# Patient Record
Sex: FEMALE | Race: BLACK OR AFRICAN AMERICAN | Employment: UNEMPLOYED | URBAN - METROPOLITAN AREA
[De-identification: names, ages, dates, MRNs, and addresses within clinical notes are randomized per-mention and may not be internally consistent; named-entity substitution may affect disease eponyms.]

---

## 2017-01-03 ENCOUNTER — APPOINTMENT (OUTPATIENT)
Dept: GENERAL RADIOLOGY | Age: 42
End: 2017-01-03
Attending: PHYSICIAN ASSISTANT
Payer: MEDICAID

## 2017-01-03 ENCOUNTER — HOSPITAL ENCOUNTER (EMERGENCY)
Age: 42
Discharge: HOME | End: 2017-01-03
Attending: EMERGENCY MEDICINE | Admitting: EMERGENCY MEDICINE
Payer: MEDICAID

## 2017-01-03 VITALS
DIASTOLIC BLOOD PRESSURE: 80 MMHG | OXYGEN SATURATION: 92 % | SYSTOLIC BLOOD PRESSURE: 150 MMHG | TEMPERATURE: 98.1 F | BODY MASS INDEX: 50.02 KG/M2 | HEIGHT: 64 IN | RESPIRATION RATE: 22 BRPM | HEART RATE: 99 BPM | WEIGHT: 293 LBS

## 2017-01-03 DIAGNOSIS — J45.31 MILD PERSISTENT ASTHMA WITH ACUTE EXACERBATION: Primary | ICD-10-CM

## 2017-01-03 LAB
ALBUMIN SERPL BCP-MCNC: 3.2 G/DL (ref 3.5–5)
ALBUMIN/GLOB SERPL: 0.7 {RATIO} (ref 1.1–2.2)
ALP SERPL-CCNC: 85 U/L (ref 45–117)
ALT SERPL-CCNC: 28 U/L (ref 12–78)
ANION GAP BLD CALC-SCNC: 8 MMOL/L (ref 5–15)
AST SERPL W P-5'-P-CCNC: 30 U/L (ref 15–37)
BASOPHILS # BLD AUTO: 0 K/UL (ref 0–0.1)
BASOPHILS # BLD: 0 % (ref 0–1)
BILIRUB SERPL-MCNC: 0.8 MG/DL (ref 0.2–1)
BNP SERPL-MCNC: 41 PG/ML (ref 0–125)
BUN SERPL-MCNC: 7 MG/DL (ref 6–20)
BUN/CREAT SERPL: 7 (ref 12–20)
CALCIUM SERPL-MCNC: 8.9 MG/DL (ref 8.5–10.1)
CHLORIDE SERPL-SCNC: 101 MMOL/L (ref 97–108)
CO2 SERPL-SCNC: 28 MMOL/L (ref 21–32)
CREAT SERPL-MCNC: 0.95 MG/DL (ref 0.55–1.02)
DIFFERENTIAL METHOD BLD: ABNORMAL
EOSINOPHIL # BLD: 0.1 K/UL (ref 0–0.4)
EOSINOPHIL NFR BLD: 2 % (ref 0–7)
ERYTHROCYTE [DISTWIDTH] IN BLOOD BY AUTOMATED COUNT: 20.7 % (ref 11.5–14.5)
GLOBULIN SER CALC-MCNC: 4.8 G/DL (ref 2–4)
GLUCOSE SERPL-MCNC: 169 MG/DL (ref 65–100)
HCT VFR BLD AUTO: 33.7 % (ref 35–47)
HGB BLD-MCNC: 9.2 G/DL (ref 11.5–16)
LYMPHOCYTES # BLD AUTO: 29 % (ref 12–49)
LYMPHOCYTES # BLD: 1.9 K/UL (ref 0.8–3.5)
MAGNESIUM SERPL-MCNC: 1.8 MG/DL (ref 1.6–2.4)
MCH RBC QN AUTO: 16.6 PG (ref 26–34)
MCHC RBC AUTO-ENTMCNC: 27.3 G/DL (ref 30–36.5)
MCV RBC AUTO: 60.8 FL (ref 80–99)
MONOCYTES # BLD: 0.6 K/UL (ref 0–1)
MONOCYTES NFR BLD AUTO: 9 % (ref 5–13)
NEUTS SEG # BLD: 3.9 K/UL (ref 1.8–8)
NEUTS SEG NFR BLD AUTO: 60 % (ref 32–75)
PLATELET # BLD AUTO: 352 K/UL (ref 150–400)
POTASSIUM SERPL-SCNC: 4.2 MMOL/L (ref 3.5–5.1)
PROT SERPL-MCNC: 8 G/DL (ref 6.4–8.2)
RBC # BLD AUTO: 5.54 M/UL (ref 3.8–5.2)
RBC MORPH BLD: ABNORMAL
SODIUM SERPL-SCNC: 137 MMOL/L (ref 136–145)
WBC # BLD AUTO: 6.5 K/UL (ref 3.6–11)

## 2017-01-03 PROCEDURE — 36415 COLL VENOUS BLD VENIPUNCTURE: CPT | Performed by: PHYSICIAN ASSISTANT

## 2017-01-03 PROCEDURE — 80053 COMPREHEN METABOLIC PANEL: CPT | Performed by: PHYSICIAN ASSISTANT

## 2017-01-03 PROCEDURE — 96365 THER/PROPH/DIAG IV INF INIT: CPT

## 2017-01-03 PROCEDURE — 77030029684 HC NEB SM VOL KT MONA -A

## 2017-01-03 PROCEDURE — 94640 AIRWAY INHALATION TREATMENT: CPT

## 2017-01-03 PROCEDURE — 83880 ASSAY OF NATRIURETIC PEPTIDE: CPT | Performed by: PHYSICIAN ASSISTANT

## 2017-01-03 PROCEDURE — 83735 ASSAY OF MAGNESIUM: CPT | Performed by: PHYSICIAN ASSISTANT

## 2017-01-03 PROCEDURE — 94762 N-INVAS EAR/PLS OXIMTRY CONT: CPT

## 2017-01-03 PROCEDURE — 74011250636 HC RX REV CODE- 250/636: Performed by: PHYSICIAN ASSISTANT

## 2017-01-03 PROCEDURE — 96375 TX/PRO/DX INJ NEW DRUG ADDON: CPT

## 2017-01-03 PROCEDURE — 99285 EMERGENCY DEPT VISIT HI MDM: CPT

## 2017-01-03 PROCEDURE — 74011000250 HC RX REV CODE- 250: Performed by: PHYSICIAN ASSISTANT

## 2017-01-03 PROCEDURE — 85025 COMPLETE CBC W/AUTO DIFF WBC: CPT | Performed by: PHYSICIAN ASSISTANT

## 2017-01-03 PROCEDURE — 71010 XR CHEST PORT: CPT

## 2017-01-03 PROCEDURE — 96366 THER/PROPH/DIAG IV INF ADDON: CPT

## 2017-01-03 RX ORDER — DEXAMETHASONE SODIUM PHOSPHATE 10 MG/ML
10 INJECTION INTRAMUSCULAR; INTRAVENOUS
Status: COMPLETED | OUTPATIENT
Start: 2017-01-03 | End: 2017-01-03

## 2017-01-03 RX ORDER — ALBUTEROL SULFATE 0.83 MG/ML
2.5 SOLUTION RESPIRATORY (INHALATION)
Qty: 24 EACH | Refills: 0 | Status: ON HOLD | OUTPATIENT
Start: 2017-01-03 | End: 2017-07-19

## 2017-01-03 RX ORDER — SODIUM CHLORIDE 0.9 % (FLUSH) 0.9 %
5-10 SYRINGE (ML) INJECTION AS NEEDED
Status: DISCONTINUED | OUTPATIENT
Start: 2017-01-03 | End: 2017-01-03 | Stop reason: HOSPADM

## 2017-01-03 RX ORDER — IPRATROPIUM BROMIDE AND ALBUTEROL SULFATE 2.5; .5 MG/3ML; MG/3ML
3 SOLUTION RESPIRATORY (INHALATION) ONCE
Status: COMPLETED | OUTPATIENT
Start: 2017-01-03 | End: 2017-01-03

## 2017-01-03 RX ORDER — MAGNESIUM SULFATE HEPTAHYDRATE 40 MG/ML
2 INJECTION, SOLUTION INTRAVENOUS ONCE
Status: COMPLETED | OUTPATIENT
Start: 2017-01-03 | End: 2017-01-03

## 2017-01-03 RX ORDER — IPRATROPIUM BROMIDE 0.5 MG/2.5ML
0.5 SOLUTION RESPIRATORY (INHALATION) AS NEEDED
Qty: 2.5 ML | Refills: 0 | Status: ON HOLD
Start: 2017-01-03 | End: 2017-07-19

## 2017-01-03 RX ORDER — ALBUTEROL SULFATE 2.5 MG/.5ML
2.5 SOLUTION RESPIRATORY (INHALATION)
Status: COMPLETED | OUTPATIENT
Start: 2017-01-03 | End: 2017-01-03

## 2017-01-03 RX ORDER — CODEINE PHOSPHATE AND GUAIFENESIN 10; 100 MG/5ML; MG/5ML
5 SOLUTION ORAL
Qty: 120 ML | Refills: 0 | Status: SHIPPED | OUTPATIENT
Start: 2017-01-03 | End: 2017-04-04

## 2017-01-03 RX ORDER — PREDNISONE 50 MG/1
50 TABLET ORAL DAILY
Qty: 3 TAB | Refills: 0 | Status: SHIPPED | OUTPATIENT
Start: 2017-01-03 | End: 2017-01-06

## 2017-01-03 RX ADMIN — Medication 10 ML: at 13:13

## 2017-01-03 RX ADMIN — IPRATROPIUM BROMIDE AND ALBUTEROL SULFATE 3 ML: .5; 3 SOLUTION RESPIRATORY (INHALATION) at 12:13

## 2017-01-03 RX ADMIN — DEXAMETHASONE SODIUM PHOSPHATE 10 MG: 10 INJECTION, SOLUTION INTRAMUSCULAR; INTRAVENOUS at 12:41

## 2017-01-03 RX ADMIN — Medication 10 ML: at 13:12

## 2017-01-03 RX ADMIN — ALBUTEROL SULFATE 2.5 MG: 2.5 SOLUTION RESPIRATORY (INHALATION) at 14:41

## 2017-01-03 RX ADMIN — FAMOTIDINE 20 MG: 10 INJECTION INTRAVENOUS at 13:12

## 2017-01-03 RX ADMIN — MAGNESIUM SULFATE HEPTAHYDRATE 2 G: 40 INJECTION, SOLUTION INTRAVENOUS at 12:40

## 2017-01-03 NOTE — ED PROVIDER NOTES
HPI Comments: 35y.o. female presents to ED complaining of SOB and wheezing onset 2 weeks ago but worsening over the last 2 days since staying with her friend who has cats. Patient has long Hx of asthma and has been using her MDI but not her nebulizer since she wasn't home. Patient is also diabetic and glucose via EMS was 108. Patient reports she has also been coughing up some white phlegm. Patient states she has never had to be intubated or hospitalized for asthma issues. She is undergoing testing through a cardiologist at [facility] to R/O CHF and is scheduled for a stress test on 1/21. Patient denies CP, Hx of DVT or PE, recent long travel, OCP use or any other symptoms or complaints    The history is provided by the patient. Past Medical History:   Diagnosis Date    Asthma     Diabetes ([address]     Hypertension     Psychiatric disorder      depression       Past Surgical History:   Procedure Laterality Date    Hx tubal ligation           History reviewed. No pertinent family history. Social History     Social History    Marital status:      Spouse name: N/A    Number of children: N/A    Years of education: N/A     Occupational History    Not on file. Social History Main Topics    Smoking status: Never Smoker    Smokeless tobacco: Not on file    Alcohol use Yes    Drug use: No    Sexual activity: Yes     Partners: Male     Birth control/ protection: Surgical     Other Topics Concern    Not on file     Social History Narrative         ALLERGIES: Aspirin    Review of Systems   Constitutional: Negative for activity change and unexpected weight change. Respiratory: Positive for cough, shortness of breath and wheezing. Cardiovascular: Negative for chest pain. Gastrointestinal: Positive for abdominal pain. Skin: Negative for rash. Neurological: Negative for syncope and headaches. All other systems reviewed and are negative.       Vitals:    01/03/17 1229 01/03/17 1230 01/03/17 1257 01/03/17 1407   BP:  143/55  150/80   Pulse:  99  99   Resp:  19  22   Temp:       SpO2: 92% 94% 95% 92%   Weight:       Height:                Physical Exam   Constitutional: She is oriented to person, place, and time. She appears well-developed and well-nourished. No distress. Morbid obesity   HENT:   Head: Normocephalic and atraumatic. Right Ear: Tympanic membrane, external ear and ear canal normal.   Left Ear: Tympanic membrane, external ear and ear canal normal.   Nose: Nose normal.   Mouth/Throat: Uvula is midline, oropharynx is clear and moist and mucous membranes are normal. No oropharyngeal exudate, posterior oropharyngeal edema or posterior oropharyngeal erythema. Eyes: EOM are normal. Pupils are equal, round, and reactive to light. Right eye exhibits no discharge. Left eye exhibits no discharge. Neck: Trachea normal, normal range of motion and full passive range of motion without pain. Neck supple. No rigidity. Cardiovascular: Normal rate, regular rhythm, normal heart sounds and normal pulses. Exam reveals no gallop and no friction rub. No murmur heard. Pulmonary/Chest: Effort normal and breath sounds normal. No respiratory distress. She has no wheezes. She has no rales. She exhibits no tenderness. Moderate diffuse wheezing   Abdominal: Soft. Bowel sounds are normal. She exhibits no distension and no mass. There is no tenderness. There is no rebound and no guarding. Musculoskeletal: Normal range of motion. Lymphadenopathy:     She has no cervical adenopathy. Neurological: She is alert and oriented to person, place, and time. Skin: Skin is warm and dry. No rash noted. She is not diaphoretic. Psychiatric: She has a normal mood and affect. Judgment normal.   Nursing note and vitals reviewed.        MDM  Number of Diagnoses or Management Options  Diagnosis management comments: DDX: asthma, COPD, PNA, bronchitis, CHF       Amount and/or Complexity of Data Reviewed  Clinical lab tests: ordered and reviewed  Tests in the radiology section of CPT®: ordered and reviewed  Review and summarize past medical records: yes      ED Course     Recent Results (from the past 12 hour(s))   CBC WITH AUTOMATED DIFF    Collection Time: 01/03/17 12:14 PM   Result Value Ref Range    WBC 6.5 3.6 - 11.0 K/uL    RBC 5.54 (H) 3.80 - 5.20 M/uL    HGB 9.2 (L) 11.5 - 16.0 g/dL    HCT 33.7 (L) 35.0 - 47.0 %    MCV 60.8 (L) 80.0 - 99.0 FL    MCH 16.6 (L) 26.0 - 34.0 PG    MCHC 27.3 (L) 30.0 - 36.5 g/dL    RDW 20.7 (H) 11.5 - 14.5 %    PLATELET 387 797 - 453 K/uL    NEUTROPHILS 60 32 - 75 %    LYMPHOCYTES 29 12 - 49 %    MONOCYTES 9 5 - 13 %    EOSINOPHILS 2 0 - 7 %    BASOPHILS 0 0 - 1 %    ABS. NEUTROPHILS 3.9 1.8 - 8.0 K/UL    ABS. LYMPHOCYTES 1.9 0.8 - 3.5 K/UL    ABS. MONOCYTES 0.6 0.0 - 1.0 K/UL    ABS. EOSINOPHILS 0.1 0.0 - 0.4 K/UL    ABS. BASOPHILS 0.0 0.0 - 0.1 K/UL    DF SMEAR SCANNED      RBC COMMENTS ANISOCYTOSIS  1+        RBC COMMENTS HYPOCHROMIA  1+        RBC COMMENTS MICROCYTOSIS  1+       METABOLIC PANEL, COMPREHENSIVE    Collection Time: 01/03/17 12:14 PM   Result Value Ref Range    Sodium 137 136 - 145 mmol/L    Potassium 4.2 3.5 - 5.1 mmol/L    Chloride 101 97 - 108 mmol/L    CO2 28 21 - 32 mmol/L    Anion gap 8 5 - 15 mmol/L    Glucose 169 (H) 65 - 100 mg/dL    BUN 7 6 - 20 MG/DL    Creatinine 0.95 0.55 - 1.02 MG/DL    BUN/Creatinine ratio 7 (L) 12 - 20      GFR est AA >60 >60 ml/min/1.73m2    GFR est non-AA >60 >60 ml/min/1.73m2    Calcium 8.9 8.5 - 10.1 MG/DL    Bilirubin, total 0.8 0.2 - 1.0 MG/DL    ALT 28 12 - 78 U/L    AST 30 15 - 37 U/L    Alk.  phosphatase 85 45 - 117 U/L    Protein, total 8.0 6.4 - 8.2 g/dL    Albumin 3.2 (L) 3.5 - 5.0 g/dL    Globulin 4.8 (H) 2.0 - 4.0 g/dL    A-G Ratio 0.7 (L) 1.1 - 2.2     MAGNESIUM    Collection Time: 01/03/17 12:14 PM   Result Value Ref Range    Magnesium 1.8 1.6 - 2.4 mg/dL   PRO-BNP    Collection Time: 01/03/17 12:17 PM Result Value Ref Range    NT pro-BNP 41 0 - 125 PG/ML      XR CHEST PORT   Final Result      IMPRESSION: Mild left basilar atelectasis.         Procedures      PROGRESS NOTE:  11:55 AM   Initial assessment performed. 1:10 PM   Patient feeling much improved as far as breathing is concerned. Only very mild late expiratory wheezing on exam.    2:52 PM   Improved still with occasional dry cough. Will discharge and patient will return for worsening. DISCHARGE NOTE:   2:53 PM   Allegra Salgado results have been reviewed with patient and/or family. Patient and/or family has been counseled regarding diagnosis, treatment, and plan. Patient and/or family verbally conveys understanding and agreement of the signs, symptoms, diagnosis, treatment and prognosis and additionally agrees to follow up as discussed. Patient and/or family also agrees with the care-plan and conveys that all of his/her questions have been answered. I have also provided discharge instructions for the patient and/or family that include: educational information regarding their diagnosis and treatment, and list of reasons why they would want to return to the ED prior to their follow-up appointment, should patient's condition change. CLINICAL IMPRESSION    1. Mild persistent asthma with acute exacerbation         AFTER VISIT PLAN    Follow-up Information     Follow up With Details Comments [address] [facility] Schedule an appointment as soon as possible for a visit in 2 days  [address] [facility] [address] [facility] EMERGENCY DEPT  If symptoms worsen [place]  [phone]          Current Discharge Medication List      START taking these medications    Details   predniSONE (DELTASONE) 50 mg tablet Take 1 Tab by mouth daily for 3 days.   Qty: 3 Tab, Refills: 0      !! albuterol (PROVENTIL VENTOLIN) 2.5 mg /3 mL (0.083 %) nebulizer solution 3 mL by Nebulization route every four (4) hours as needed for Wheezing. Qty: 24 Each, Refills: 0       !! - Potential duplicate medications found. Please discuss with provider. CONTINUE these medications which have NOT CHANGED    Details   GLIPIZIDE PO Take  by mouth. VERAPAMIL HCL (VERAPAMIL PO) Take  by mouth. !! albuterol (PROVENTIL VENTOLIN) 2.5 mg /3 mL (0.083 %) nebulizer solution 3 mL by Nebulization route every four (4) hours as needed for Wheezing or Shortness of Breath. Qty: 24 Package, Refills: 0      albuterol (PROVENTIL, VENTOLIN) 90 mcg/actuation inhaler Take 1-2 Puffs by inhalation every four (4) hours as needed for Wheezing or Shortness of Breath. Qty: 17 g, Refills: 0      fluticasone-salmeterol (ADVAIR DISKUS) 100-50 mcg/dose diskus inhaler Take 1 Puff by inhalation every twelve (12) hours. Qty: 1 Inhaler, Refills: 0      busPIRone (BUSPAR) 15 mg tablet Take 15 mg by mouth three (3) times daily. lisinopril (PRINIVIL, ZESTRIL) 20 mg tablet Take 20 mg by mouth daily. risperidone (RISPERDAL) 0.5 mg tablet Take 0.5 mg by mouth.      trazodone (DESYREL) 50 mg tablet Take 50 mg by mouth nightly. !! - Potential duplicate medications found. Please discuss with provider.

## 2017-01-03 NOTE — ED NOTES
Reviewed discharge instructions and follow up information with patient. Patient received (4) prescriptions. Ambulatory independently. Discharged home with ride.

## 2017-01-03 NOTE — DISCHARGE INSTRUCTIONS
Asthma Attack: Care Instructions  Your Care Instructions    During an asthma attack, the airways swell and narrow. This makes it hard to breathe. Severe asthma attacks can be life-threatening, but you can help prevent them by keeping your asthma under control and treating symptoms before they get bad. Symptoms include being short of breath, having chest tightness, coughing, and wheezing. Noting and treating these symptoms can also help you avoid future trips to the emergency room. The doctor has checked you carefully, but problems can develop later. If you notice any problems or new symptoms, get medical treatment right away. Follow-up care is a key part of your treatment and safety. Be sure to make and go to all appointments, and call your doctor if you are having problems. It's also a good idea to know your test results and keep a list of the medicines you take. How can you care for yourself at home? · Follow your asthma action plan to prevent and treat attacks. If you don't have an asthma action plan, work with your doctor to create one. · Take your asthma medicines exactly as prescribed. Talk to your doctor right away if you have any questions about how to take them. ¨ Use your quick-relief medicine when you have symptoms of an attack. Quick-relief medicine is usually an albuterol inhaler. Some people need to use quick-relief medicine before they exercise. ¨ Take your controller medicine every day, not just when you have symptoms. Controller medicine is usually an inhaled corticosteroid. The goal is to prevent problems before they occur. Don't use your controller medicine to treat an attack that has already started. It doesn't work fast enough to help. ¨ If your doctor prescribed corticosteroid pills to use during an attack, take them exactly as prescribed. It may take hours for the pills to work, but they may make the episode shorter and help you breathe better.   ¨ Keep your quick-relief medicine with you at all times. · Talk to your doctor before using other medicines. Some medicines, such as aspirin, can cause asthma attacks in some people. · If you have a peak flow meter, use it to check how well you are breathing. This can help you predict when an asthma attack is going to occur. Then you can take medicine to prevent the asthma attack or make it less severe. · Do not smoke or allow others to smoke around you. Avoid smoky places. Smoking makes asthma worse. If you need help quitting, talk to your doctor about stop-smoking programs and medicines. These can increase your chances of quitting for good. · Learn what triggers an asthma attack for you, and avoid the triggers when you can. Common triggers include colds, smoke, air pollution, dust, pollen, mold, pets, cockroaches, stress, and cold air. · Avoid colds and the flu. Get a pneumococcal vaccine shot. If you have had one before, ask your doctor if you need a second dose. Get a flu vaccine every fall. If you must be around people with colds or the flu, wash your hands often. When should you call for help? Call 911 anytime you think you may need emergency care. For example, call if:  · You have severe trouble breathing. Call your doctor now or seek immediate medical care if:  · Your symptoms do not get better after you have followed your asthma action plan. · You have new or worse trouble breathing. · Your coughing and wheezing get worse. · You cough up dark brown or bloody mucus (sputum). · You have a new or higher fever. Watch closely for changes in your health, and be sure to contact your doctor if:  · You need to use quick-relief medicine on more than 2 days a week (unless it is just for exercise). · You cough more deeply or more often, especially if you notice more mucus or a change in the color of your mucus. · You are not getting better as expected. Where can you learn more?   Go to http://estefania-gian.info/. Enter N323 in the search box to learn more about \"Asthma Attack: Care Instructions. \"  Current as of: May 23, 2016  Content Version: 11.1 © 2006-2016 [facility]. Care instructions adapted under license by [employer] (which disclaims liability or warranty for this information). If you have questions about a medical condition or this instruction, always ask your healthcare professional. [address] any warranty or liability for your use of this information. Asthma: Your Action Plan  Sample Action Plan  Controller medicine action plan  Fill in the blank spaces and boxes that apply for all sections. · Name of your controller medicine:  ¨ ____________________________________________  · How much of this medicine do you take? ¨ ____________________________________________  · How often do you take this medicine? ¨ ____________________________________________  · Other instructions? ¨ ____________________________________________  Quick-relief medicine action plan  · Name of your quick-relief medicine:  ¨ ____________________________________________  · How much of this medicine do you take? ¨ ____________________________________________  · How often do you take this medicine? ¨ ____________________________________________  Asthma Zones  GREEN ZONE: This is where you want to be! Green zone symptoms  · You have no shortness of breath or chest tightness. You are not coughing or wheezing. · You can do all of your usual activities. · You sleep well at night. Green zone peak flow (if you use a peak flow meter)  · ______ or more (80% or more of your personal best)  Green zone actions (Check the boxes and fill in the blank spaces that apply.)  [ ] You take your controller medicine(s) every day. [ ] Ashley Willoughby are staying away from your asthma triggers.   [ ] You take quick-relief medicine (called _____________________) ______ minutes before exercise. YELLOW ZONE: Your asthma is getting worse. Yellow zone symptoms  · You are short of breath or have chest tightness. You are coughing or wheezing. · You have symptoms that keep you up at night. · You can do some, but not all, of your usual activities. Yellow zone peak flow (if you use a peak flow meter)  · ______ to ______ (50% to 79% of your personal best)  Yellow zone actions (Check the boxes and fill in the blank spaces that apply.)  [ ] Take _____ puff(s) of quick-relief medicine called ______________________. Repeat _____ times. [ ] If your symptoms don't get better or your peak flow has not returned to the green zone in 1 hour, then:  · [ ] Take _____ puff(s) of medicine called ______________________. Take it ____ times a day. · [ ] Begin or increase treatment with corticosteroid pills. Take ______ mg of medicine called ____________________________ every __________. · [ ] Call your doctor at this number: ____________________. RED ZONE: Danger! Red zone symptoms  · You are very short of breath. · You can't do your usual activities. · Quick-relief medicine doesn't help. Or your symptoms don't get better after 24 hours in the yellow zone. Red zone peak flow (if you use a peak flow meter)  · Less than _______ (less than 50% of your personal best)  Red zone actions (Check the boxes and fill in the blank spaces that apply.)  [ ] Take _____ puff(s) of quick-relief medicine called ____________________________. Repeat ______ times. [ ] Begin or increase treatment with corticosteroid pills. Take ________ mg now. [ ] Call your doctor at this number: _________________. If you can't contact your doctor, go to the emergency department. Call 911 or ___________________. [ ] Other numbers you might call are: ___________________________________. When should you call for help? Call 911 anytime you think you may need emergency care.  For example, call if:  · You have severe trouble breathing. Call your doctor now or seek immediate medical care if:  · You are in the red zone of your asthma action plan. · You've used your quick-relief medicine but are still having trouble breathing. · You cough up blood. · You have new or worse trouble breathing. · You cough up dark brown or bloody mucus (sputum). Watch closely for changes in your health, and be sure to contact your doctor if:  · You need to use quick-relief medicine more than 2 days each week (unless it's just for exercise). · Your coughing and wheezing get worse. Follow-up care is a key part of your treatment and safety. Be sure to make and go to all appointments, and call your doctor if you are having problems. It's also a good idea to know your test results and keep a list of the medicines you take. Where can you learn more? Go to http://estefania-gian.info/. Enter 27 20 81 in the search box to learn more about \"Asthma: Your Action Plan. \"  Current as of: July 29, 2016  Content Version: 11.1  © 3638-0853 [employer]. Care instructions adapted under license by [employer] (which disclaims liability or warranty for this information). If you have questions about a medical condition or this instruction, always ask your healthcare professional. [address] any warranty or liability for your use of this information.

## 2017-01-03 NOTE — ED TRIAGE NOTES
Presents via EMS with c/o SOB x2 weeks. Patient with hx of asthma reports using home neb several times without relief. Received Albuterol en route. No steroids administered en route.     per EMS

## 2017-01-03 NOTE — ED NOTES
Emergency Department Nursing Plan of Care       The Nursing Plan of Care is developed from the Nursing assessment and Emergency Department Attending provider initial evaluation. The plan of care may be reviewed in the ED Provider note.     The Plan of Care was developed with the following considerations:   Patient / Family readiness to learn indicated by:verbalized understanding  Persons(s) to be included in education: patient  Barriers to Learning/Limitations:No    Signed     Ulysses Mcgarry RN    1/3/2017   1:00 PM

## 2017-01-03 NOTE — ED NOTES
At patient's bedside to administer medications. Patient has persistent, dry cough. Experienced episode of \"sharp\" cramping abdominal pain while nursing staff at bedside. Patient standing upright at bedside and anxious. MD and PA at bedside to re-examine patient.

## 2017-01-24 ENCOUNTER — CLINICAL SUPPORT (OUTPATIENT)
Dept: CARDIOLOGY CLINIC | Age: 42
End: 2017-01-24

## 2017-01-24 ENCOUNTER — OFFICE VISIT (OUTPATIENT)
Dept: CARDIOLOGY CLINIC | Age: 42
End: 2017-01-24

## 2017-01-24 DIAGNOSIS — R06.02 SOBOE (SHORTNESS OF BREATH ON EXERTION): ICD-10-CM

## 2017-01-30 ENCOUNTER — CLINICAL SUPPORT (OUTPATIENT)
Dept: CARDIOLOGY CLINIC | Age: 42
End: 2017-01-30

## 2017-01-30 DIAGNOSIS — R06.02 SOBOE (SHORTNESS OF BREATH ON EXERTION): ICD-10-CM

## 2017-02-02 ENCOUNTER — TELEPHONE (OUTPATIENT)
Dept: CARDIOLOGY CLINIC | Age: 42
End: 2017-02-02

## 2017-02-02 NOTE — TELEPHONE ENCOUNTER
----- Message from Cody Briones MD sent at 2/1/2017  5:29 PM EST -----  Inform her Echo is ok    Left message to call me back. Left 2nd message to call me back. Pt returned my call,verified pt with two pt identifiers, told pt her echo is okay. She verbalized that she understood everything.

## 2017-04-04 ENCOUNTER — APPOINTMENT (OUTPATIENT)
Dept: GENERAL RADIOLOGY | Age: 42
End: 2017-04-04
Attending: PHYSICIAN ASSISTANT
Payer: MEDICAID

## 2017-04-04 ENCOUNTER — HOSPITAL ENCOUNTER (EMERGENCY)
Age: 42
Discharge: HOME | End: 2017-04-04
Attending: EMERGENCY MEDICINE
Payer: MEDICAID

## 2017-04-04 VITALS
SYSTOLIC BLOOD PRESSURE: 188 MMHG | HEIGHT: 63 IN | WEIGHT: 293 LBS | RESPIRATION RATE: 18 BRPM | HEART RATE: 90 BPM | DIASTOLIC BLOOD PRESSURE: 82 MMHG | TEMPERATURE: 98.4 F | BODY MASS INDEX: 51.91 KG/M2 | OXYGEN SATURATION: 95 %

## 2017-04-04 DIAGNOSIS — J02.9 SORE THROAT: Primary | ICD-10-CM

## 2017-04-04 DIAGNOSIS — Z91.14 NONCOMPLIANCE WITH MEDICATION REGIMEN: ICD-10-CM

## 2017-04-04 DIAGNOSIS — Z86.79 H/O: HTN (HYPERTENSION): ICD-10-CM

## 2017-04-04 LAB
ALBUMIN SERPL BCP-MCNC: 3.5 G/DL (ref 3.5–5)
ALBUMIN/GLOB SERPL: 0.7 {RATIO} (ref 1.1–2.2)
ALP SERPL-CCNC: 95 U/L (ref 45–117)
ALT SERPL-CCNC: 59 U/L (ref 12–78)
ANION GAP BLD CALC-SCNC: 14 MMOL/L (ref 5–15)
APPEARANCE UR: CLEAR
AST SERPL W P-5'-P-CCNC: 71 U/L (ref 15–37)
BACTERIA URNS QL MICRO: NEGATIVE /HPF
BASOPHILS # BLD AUTO: 0 K/UL (ref 0–0.1)
BASOPHILS # BLD: 0 % (ref 0–1)
BILIRUB SERPL-MCNC: 0.9 MG/DL (ref 0.2–1)
BILIRUB UR QL: NEGATIVE
BUN SERPL-MCNC: 7 MG/DL (ref 6–20)
BUN/CREAT SERPL: 8 (ref 12–20)
CALCIUM SERPL-MCNC: 9.2 MG/DL (ref 8.5–10.1)
CHLORIDE SERPL-SCNC: 98 MMOL/L (ref 97–108)
CO2 SERPL-SCNC: 25 MMOL/L (ref 21–32)
COLOR UR: NORMAL
CREAT SERPL-MCNC: 0.91 MG/DL (ref 0.55–1.02)
DEPRECATED S PYO AG THROAT QL EIA: NEGATIVE
DIFFERENTIAL METHOD BLD: ABNORMAL
EOSINOPHIL # BLD: 0.1 K/UL (ref 0–0.4)
EOSINOPHIL NFR BLD: 1 % (ref 0–7)
EPITH CASTS URNS QL MICRO: NORMAL /LPF
ERYTHROCYTE [DISTWIDTH] IN BLOOD BY AUTOMATED COUNT: 19.8 % (ref 11.5–14.5)
GLOBULIN SER CALC-MCNC: 5.3 G/DL (ref 2–4)
GLUCOSE SERPL-MCNC: 158 MG/DL (ref 65–100)
GLUCOSE UR STRIP.AUTO-MCNC: NEGATIVE MG/DL
HCT VFR BLD AUTO: 34.1 % (ref 35–47)
HGB BLD-MCNC: 9.6 G/DL (ref 11.5–16)
HGB UR QL STRIP: NEGATIVE
HYALINE CASTS URNS QL MICRO: NORMAL /LPF (ref 0–5)
KETONES UR QL STRIP.AUTO: NEGATIVE MG/DL
LEUKOCYTE ESTERASE UR QL STRIP.AUTO: NEGATIVE
LIPASE SERPL-CCNC: 81 U/L (ref 73–393)
LYMPHOCYTES # BLD AUTO: 27 % (ref 12–49)
LYMPHOCYTES # BLD: 1.8 K/UL (ref 0.8–3.5)
MCH RBC QN AUTO: 17.9 PG (ref 26–34)
MCHC RBC AUTO-ENTMCNC: 28.2 G/DL (ref 30–36.5)
MCV RBC AUTO: 63.5 FL (ref 80–99)
MONOCYTES # BLD: 0.5 K/UL (ref 0–1)
MONOCYTES NFR BLD AUTO: 8 % (ref 5–13)
NEUTS SEG # BLD: 4.4 K/UL (ref 1.8–8)
NEUTS SEG NFR BLD AUTO: 64 % (ref 32–75)
NITRITE UR QL STRIP.AUTO: NEGATIVE
PH UR STRIP: 7 [PH] (ref 5–8)
PLATELET # BLD AUTO: 342 K/UL (ref 150–400)
PLATELET COMMENTS,PCOM: ABNORMAL
POTASSIUM SERPL-SCNC: 3.6 MMOL/L (ref 3.5–5.1)
PROT SERPL-MCNC: 8.8 G/DL (ref 6.4–8.2)
PROT UR STRIP-MCNC: NEGATIVE MG/DL
RBC # BLD AUTO: 5.37 M/UL (ref 3.8–5.2)
RBC #/AREA URNS HPF: NORMAL /HPF (ref 0–5)
RBC MORPH BLD: ABNORMAL
SODIUM SERPL-SCNC: 137 MMOL/L (ref 136–145)
SP GR UR REFRACTOMETRY: <1.005 (ref 1–1.03)
UA: UC IF INDICATED,UAUC: NORMAL
UROBILINOGEN UR QL STRIP.AUTO: 0.2 EU/DL (ref 0.2–1)
WBC # BLD AUTO: 6.8 K/UL (ref 3.6–11)
WBC URNS QL MICRO: NORMAL /HPF (ref 0–4)

## 2017-04-04 PROCEDURE — 85025 COMPLETE CBC W/AUTO DIFF WBC: CPT | Performed by: EMERGENCY MEDICINE

## 2017-04-04 PROCEDURE — 87070 CULTURE OTHR SPECIMN AEROBIC: CPT | Performed by: EMERGENCY MEDICINE

## 2017-04-04 PROCEDURE — 80053 COMPREHEN METABOLIC PANEL: CPT | Performed by: EMERGENCY MEDICINE

## 2017-04-04 PROCEDURE — 81001 URINALYSIS AUTO W/SCOPE: CPT | Performed by: EMERGENCY MEDICINE

## 2017-04-04 PROCEDURE — 71020 XR CHEST PA LAT: CPT

## 2017-04-04 PROCEDURE — 87880 STREP A ASSAY W/OPTIC: CPT | Performed by: PHYSICIAN ASSISTANT

## 2017-04-04 PROCEDURE — 36415 COLL VENOUS BLD VENIPUNCTURE: CPT | Performed by: EMERGENCY MEDICINE

## 2017-04-04 PROCEDURE — 74011250637 HC RX REV CODE- 250/637: Performed by: PHYSICIAN ASSISTANT

## 2017-04-04 PROCEDURE — 99283 EMERGENCY DEPT VISIT LOW MDM: CPT

## 2017-04-04 PROCEDURE — 83690 ASSAY OF LIPASE: CPT | Performed by: EMERGENCY MEDICINE

## 2017-04-04 RX ORDER — LISINOPRIL 20 MG/1
20 TABLET ORAL
Status: COMPLETED | OUTPATIENT
Start: 2017-04-04 | End: 2017-04-04

## 2017-04-04 RX ORDER — HYDROXYZINE 25 MG/1
50 TABLET, FILM COATED ORAL
Status: COMPLETED | OUTPATIENT
Start: 2017-04-04 | End: 2017-04-04

## 2017-04-04 RX ORDER — LORATADINE 10 MG/1
10 TABLET ORAL DAILY
Qty: 20 TAB | Refills: 0 | Status: SHIPPED | OUTPATIENT
Start: 2017-04-04 | End: 2018-05-17

## 2017-04-04 RX ORDER — FLUTICASONE PROPIONATE 50 MCG
2 SPRAY, SUSPENSION (ML) NASAL DAILY
Qty: 1 BOTTLE | Refills: 0 | Status: ON HOLD | OUTPATIENT
Start: 2017-04-04 | End: 2017-07-19

## 2017-04-04 RX ORDER — HYDROCODONE BITARTRATE AND ACETAMINOPHEN 7.5; 325 MG/15ML; MG/15ML
5 SOLUTION ORAL ONCE
Status: COMPLETED | OUTPATIENT
Start: 2017-04-04 | End: 2017-04-04

## 2017-04-04 RX ORDER — HYDROCODONE BITARTRATE AND ACETAMINOPHEN 7.5; 325 MG/15ML; MG/15ML
10 SOLUTION ORAL
Qty: 118 ML | Refills: 0 | Status: SHIPPED | OUTPATIENT
Start: 2017-04-04 | End: 2017-07-18

## 2017-04-04 RX ADMIN — HYDROXYZINE HYDROCHLORIDE 50 MG: 25 TABLET, FILM COATED ORAL at 16:20

## 2017-04-04 RX ADMIN — HYDROCODONE BITARTRATE AND ACETAMINOPHEN 5 MG: 2.5; 108 SOLUTION ORAL at 16:20

## 2017-04-04 RX ADMIN — LISINOPRIL 20 MG: 20 TABLET ORAL at 16:20

## 2017-04-04 NOTE — ED NOTES
[employer] called and notified. RN that patient does not qualify for transportation. Yellow cab arranged for pt.

## 2017-04-04 NOTE — ED NOTES
Dorota called by Janusz Iglesias. [place], [place] gave and reviewed discharge instructions with the patient. The patient verbalized understanding. The patient was given opportunity for questions. Patient discharged in stable condition to the waiting room ambulatory by self awaiting [facility] cab.

## 2017-04-04 NOTE — ED PROVIDER NOTES
HPI Comments: Arlette Ryan, 35 y.o. Female with PMHx of HTN, DM, and asthma presents via EMS to the ED with cc of sore throat and fatigue x 1 day. Pt also reports cough intermittently productive of greenish mucous. Pt notes that she has not been eating or drinking water due to sore throat. She has not taken any medications for her symptoms. She has not had sick contact at home or work. Pt reports ASA allergy. She denies any fevers, chills, nausea, vomiting, diarrhea, abdominal pain, rhinorrhea, congestion, dysuria, hematuria, CP or SOB. Social history significant for: - Tobacco, - EtOH, - Illicit Drug Use  PSHx: tubal ligation    There are no other complaints, changes, or physical findings at this time. Written by OSCAR Donovan, as dictated by Clarisa Owen PA-C. The history is provided by the patient. No  was used. Past Medical History:   Diagnosis Date    Asthma     Diabetes ([facility])     Hypertension     Psychiatric disorder     depression       Past Surgical History:   Procedure Laterality Date    HX TUBAL LIGATION           No family history on file. Social History     Social History    Marital status:      Spouse name: N/A    Number of children: N/A    Years of education: N/A     Occupational History    Not on file. Social History Main Topics    Smoking status: Never Smoker    Smokeless tobacco: Not on file    Alcohol use Yes    Drug use: No    Sexual activity: Yes     Partners: Male     Birth control/ protection: Surgical     Other Topics Concern    Not on file     Social History Narrative         ALLERGIES: Aspirin    Review of Systems   Constitutional: Positive for fatigue. Negative for chills and fever. HENT: Positive for sore throat. Negative for congestion and rhinorrhea. Eyes: Negative. Negative for visual disturbance. Respiratory: Positive for cough. Negative for chest tightness, shortness of breath and wheezing. Cardiovascular: Negative. Negative for chest pain and palpitations. Gastrointestinal: Negative. Negative for abdominal pain, constipation, diarrhea, nausea and vomiting. Genitourinary: Negative. Negative for dysuria and hematuria. Musculoskeletal: Negative. Negative for arthralgias and myalgias. Skin: Negative. Negative for rash. Allergic/Immunologic: Negative. Negative for environmental allergies and food allergies. Neurological: Negative. Negative for headaches. Psychiatric/Behavioral: Negative. Negative for suicidal ideas. Patient Vitals for the past 12 hrs:   Temp Pulse Resp BP SpO2   04/04/17 1409 98.4 °F (36.9 °C) (!) 103 18 (!) 195/121 95 %         Physical Exam   Constitutional: She is oriented to person, place, and time. She appears well-developed. No distress. Pt is an AAF, awake and alert in NAD. Pt with elevated BMI. HENT:   Head: Normocephalic and atraumatic. Right Ear: Tympanic membrane, external ear and ear canal normal.   Left Ear: Tympanic membrane, external ear and ear canal normal.   Nose: Nose normal.   Mouth/Throat: Uvula is midline and mucous membranes are normal. Posterior oropharyngeal edema present. No oropharyngeal exudate or posterior oropharyngeal erythema. Throat with 2+ symmetrical edema, not erythematous   Pt is able to tolerate secretions  (-) muffled voice sounds   Eyes: Conjunctivae and EOM are normal. Pupils are equal, round, and reactive to light. Right eye exhibits no discharge. Left eye exhibits no discharge. Neck: Normal range of motion. Cardiovascular: Normal rate, normal heart sounds and intact distal pulses. Pulmonary/Chest: Effort normal and breath sounds normal. No respiratory distress. She has no wheezes. She has no rales. She exhibits no tenderness. No active cough   Abdominal: Soft. Bowel sounds are normal. There is no tenderness. There is no guarding. No CVA tenderness b/l. Musculoskeletal: Normal range of motion. She exhibits no edema or tenderness. Lymphadenopathy:     She has no cervical adenopathy. Neurological: She is alert and oriented to person, place, and time. No cranial nerve deficit. Coordination normal.   No focal neuro deficits. Skin: Skin is warm and dry. No rash noted. She is not diaphoretic. No erythema. No pallor. Psychiatric: She has a normal mood and affect. Her behavior is normal.   Nursing note and vitals reviewed. MDM  Number of Diagnoses or Management Options  H/O: HTN (hypertension):   Noncompliance with medication regimen:   Sore throat:   Diagnosis management comments: DDx: strep pharyngitis, seasonal allergies, viral syndrome, PNA    Uncontrolled HTN secondary to medication noncompliance       Amount and/or Complexity of Data Reviewed  Clinical lab tests: ordered and reviewed  Tests in the radiology section of CPT®: ordered and reviewed  Review and summarize past medical records: yes    Patient Progress  Patient progress: stable    ED Course       Procedures    4:10 PM  Nursing states pts legs are itching   Written by Kaity Camargo ED Scribdonna, as dictated by Susana Pascual PA-C.    5:20 PM  Unable to discharge pt as she is talking on the phone, will not end call. Written by OSCAR Pham, as dictated by Susana Pascual PA-C.    5:26 PM  Provider re-evaluated pt. Patient appears well, tolerating PO crackers. Provider discussed all available diagnostics, diagnosis, and treatment plan. Thoroughly discussed worrisome signs/symptoms in which pt should immediately return to ED, otherwise follow up with PCP. Patient conveys understanding and agreement to all of the above. All patient's questions were answered by provider.    Written by OSCAR Pham, as dictated by Susana Pascual PA-C.    LABORATORY TESTS:  Recent Results (from the past 12 hour(s))   CBC WITH AUTOMATED DIFF    Collection Time: 04/04/17  2:37 PM   Result Value Ref Range    WBC 6.8 3.6 - 11.0 K/uL    RBC 5.37 (H) 3.80 - 5.20 M/uL    HGB 9.6 (L) 11.5 - 16.0 g/dL    HCT 34.1 (L) 35.0 - 47.0 %    MCV 63.5 (L) 80.0 - 99.0 FL    MCH 17.9 (L) 26.0 - 34.0 PG    MCHC 28.2 (L) 30.0 - 36.5 g/dL    RDW 19.8 (H) 11.5 - 14.5 %    PLATELET 444 678 - 716 K/uL    NEUTROPHILS 64 32 - 75 %    LYMPHOCYTES 27 12 - 49 %    MONOCYTES 8 5 - 13 %    EOSINOPHILS 1 0 - 7 %    BASOPHILS 0 0 - 1 %    ABS. NEUTROPHILS 4.4 1.8 - 8.0 K/UL    ABS. LYMPHOCYTES 1.8 0.8 - 3.5 K/UL    ABS. MONOCYTES 0.5 0.0 - 1.0 K/UL    ABS. EOSINOPHILS 0.1 0.0 - 0.4 K/UL    ABS. BASOPHILS 0.0 0.0 - 0.1 K/UL    DF SMEAR SCANNED      PLATELET COMMENTS LARGE PLATELETS      RBC COMMENTS POLYCHROMASIA  PRESENT        RBC COMMENTS MICROCYTOSIS  1+        RBC COMMENTS HYPOCHROMIA  3+        RBC COMMENTS ANISOCYTOSIS  1+       METABOLIC PANEL, COMPREHENSIVE    Collection Time: 04/04/17  2:37 PM   Result Value Ref Range    Sodium 137 136 - 145 mmol/L    Potassium 3.6 3.5 - 5.1 mmol/L    Chloride 98 97 - 108 mmol/L    CO2 25 21 - 32 mmol/L    Anion gap 14 5 - 15 mmol/L    Glucose 158 (H) 65 - 100 mg/dL    BUN 7 6 - 20 MG/DL    Creatinine 0.91 0.55 - 1.02 MG/DL    BUN/Creatinine ratio 8 (L) 12 - 20      GFR est AA >60 >60 ml/min/1.73m2    GFR est non-AA >60 >60 ml/min/1.73m2    Calcium 9.2 8.5 - 10.1 MG/DL    Bilirubin, total 0.9 0.2 - 1.0 MG/DL    ALT (SGPT) 59 12 - 78 U/L    AST (SGOT) 71 (H) 15 - 37 U/L    Alk.  phosphatase 95 45 - 117 U/L    Protein, total 8.8 (H) 6.4 - 8.2 g/dL    Albumin 3.5 3.5 - 5.0 g/dL    Globulin 5.3 (H) 2.0 - 4.0 g/dL    A-G Ratio 0.7 (L) 1.1 - 2.2     LIPASE    Collection Time: 04/04/17  2:37 PM   Result Value Ref Range    Lipase 81 73 - 393 U/L   URINALYSIS W/ REFLEX CULTURE    Collection Time: 04/04/17  4:22 PM   Result Value Ref Range    Color YELLOW/STRAW      Appearance CLEAR CLEAR      Specific gravity <1.005 1.003 - 1.030    pH (UA) 7.0 5.0 - 8.0      Protein NEGATIVE  NEG mg/dL    Glucose NEGATIVE  NEG mg/dL    Ketone NEGATIVE  NEG mg/dL Bilirubin NEGATIVE  NEG      Blood NEGATIVE  NEG      Urobilinogen 0.2 0.2 - 1.0 EU/dL    Nitrites NEGATIVE  NEG      Leukocyte Esterase NEGATIVE  NEG      WBC 0-4 0 - 4 /hpf    RBC 0-5 0 - 5 /hpf    Epithelial cells FEW FEW /lpf    Bacteria NEGATIVE  NEG /hpf    UA:UC IF INDICATED CULTURE NOT INDICATED BY UA RESULT CNI      Hyaline cast 0-2 0 - 5 /lpf   STREP AG SCREEN, GROUP A    Collection Time: 17  4:22 PM   Result Value Ref Range    Group A Strep Ag ID NEGATIVE  NEG         IMAGING RESULTS:  XR CHEST PA LAT   Final Result   History: Productive cough.     Frontal and lateral views of the chest show clear lungs. The heart, mediastinum  and pulmonary vasculature are normal. The bony thorax is unremarkable.     IMPRESSION  IMPRESSION:  NORMAL CHEST. MEDICATIONS GIVEN:  Medications   lisinopril (PRINIVIL, ZESTRIL) tablet 20 mg (20 mg Oral Given 17)   HYDROcodone-acetaminophen (HYCET) 0.5-21.7 mg/mL oral solution 5 mg (5 mg Oral Given 17)   hydrOXYzine HCl (ATARAX) tablet 50 mg (50 mg Oral Given 17)       IMPRESSION:  1. Sore throat    2. H/O: HTN (hypertension)    3. Noncompliance with medication regimen        PLAN:  1. Current Discharge Medication List      START taking these medications    Details   loratadine (CLARITIN) 10 mg tablet Take 1 Tab by mouth daily. Qty: 20 Tab, Refills: 0      fluticasone (FLONASE) 50 mcg/actuation nasal spray 2 Sprays by Both Nostrils route daily. Qty: 1 Bottle, Refills: 0      HYDROcodone-acetaminophen (HYCET) 0.5-21.7 mg/mL oral solution Take 10 mL by mouth four (4) times daily as needed for Pain.  Max Daily Amount: 20 mg.  Qty: 118 mL, Refills: 0         STOP taking these medications       guaiFENesin-codeine (CHERATUSSIN AC) 100-10 mg/5 mL solution Comments:   Reason for Stoppin.   Follow-up Information     Follow up With Details Comments Contact Info    Please establish PCP Schedule an appointment as soon as possible for a visit in 2 days      [facility] EMERGENCY DEPT  As needed or, If symptoms worsen [address] [place] [address]  [phone]        Return to ED if worse       Discharge Note:  5:26 PM  The pt is ready for discharge. The pt's signs, symptoms, diagnosis, and discharge instructions have been discussed and pt has conveyed their understanding. The pt is to follow up as recommended or return to ER should their symptoms worsen. Plan has been discussed and pt is in agreement. This note is prepared by Natalia Toussaint, acting as a Scribe for Michelle Cochran PA-C. Michelle Cochran PA-C: The scribe's documentation has been prepared under my direction and personally reviewed by me in its entirety. I confirm that the notes above accurately reflects all work, treatment, procedures, and medical decision making performed by me. This note will not be viewable in [address]

## 2017-04-04 NOTE — DISCHARGE INSTRUCTIONS
Sore Throat: Care Instructions  Your Care Instructions    Infection by bacteria or a virus causes most sore throats. Cigarette smoke, dry air, air pollution, allergies, and yelling can also cause a sore throat. Sore throats can be painful and annoying. Fortunately, most sore throats go away on their own. If you have a bacterial infection, your doctor may prescribe antibiotics. Follow-up care is a key part of your treatment and safety. Be sure to make and go to all appointments, and call your doctor if you are having problems. It's also a good idea to know your test results and keep a list of the medicines you take. How can you care for yourself at home? · If your doctor prescribed antibiotics, take them as directed. Do not stop taking them just because you feel better. You need to take the full course of antibiotics. · Gargle with warm salt water once an hour to help reduce swelling and relieve discomfort. Use 1 teaspoon of salt mixed in 1 cup of warm water. · Take an over-the-counter pain medicine, such as acetaminophen (Tylenol), ibuprofen (Advil, Motrin), or naproxen (Aleve). Read and follow all instructions on the label. · Be careful when taking over-the-counter cold or flu medicines and Tylenol at the same time. Many of these medicines have acetaminophen, which is Tylenol. Read the labels to make sure that you are not taking more than the recommended dose. Too much acetaminophen (Tylenol) can be harmful. · Drink plenty of fluids. Fluids may help soothe an irritated throat. Hot fluids, such as tea or soup, may help decrease throat pain. · Use over-the-counter throat lozenges to soothe pain. Regular cough drops or hard candy may also help. These should not be given to young children because of the risk of choking. · Do not smoke or allow others to smoke around you. If you need help quitting, talk to your doctor about stop-smoking programs and medicines.  These can increase your chances of quitting for good. · Use a vaporizer or humidifier to add moisture to your bedroom. Follow the directions for cleaning the machine. When should you call for help? Call your doctor now or seek immediate medical care if:  · You have new or worse trouble swallowing. · Your sore throat gets much worse on one side. Watch closely for changes in your health, and be sure to contact your doctor if you do not get better as expected. Where can you learn more? Go to http://estefania-gian.info/. Enter 062 441 80 [id number] in the search box to learn more about \"Sore Throat: Care Instructions. \"  Current as of: July 29, 2016  Content Version: 11.2  © 8370-7101 [employer]. Care instructions adapted under license by [employer] (which disclaims liability or warranty for this information). If you have questions about a medical condition or this instruction, always ask your healthcare professional. [place] [address] any warranty or liability for your use of this information. Sore Throat: Care Instructions  Your Care Instructions    Infection by bacteria or a virus causes most sore throats. Cigarette smoke, dry air, air pollution, allergies, and yelling can also cause a sore throat. Sore throats can be painful and annoying. Fortunately, most sore throats go away on their own. If you have a bacterial infection, your doctor may prescribe antibiotics. Follow-up care is a key part of your treatment and safety. Be sure to make and go to all appointments, and call your doctor if you are having problems. It's also a good idea to know your test results and keep a list of the medicines you take. How can you care for yourself at home? · If your doctor prescribed antibiotics, take them as directed. Do not stop taking them just because you feel better. You need to take the full course of antibiotics. · Gargle with warm salt water once an hour to help reduce swelling and relieve discomfort.  Use 1 teaspoon of salt mixed in 1 cup of warm water. · Take an over-the-counter pain medicine, such as acetaminophen (Tylenol), ibuprofen (Advil, Motrin), or naproxen (Aleve). Read and follow all instructions on the label. · Be careful when taking over-the-counter cold or flu medicines and Tylenol at the same time. Many of these medicines have acetaminophen, which is Tylenol. Read the labels to make sure that you are not taking more than the recommended dose. Too much acetaminophen (Tylenol) can be harmful. · Drink plenty of fluids. Fluids may help soothe an irritated throat. Hot fluids, such as tea or soup, may help decrease throat pain. · Use over-the-counter throat lozenges to soothe pain. Regular cough drops or hard candy may also help. These should not be given to young children because of the risk of choking. · Do not smoke or allow others to smoke around you. If you need help quitting, talk to your doctor about stop-smoking programs and medicines. These can increase your chances of quitting for good. · Use a vaporizer or humidifier to add moisture to your bedroom. Follow the directions for cleaning the machine. When should you call for help? Call your doctor now or seek immediate medical care if:  · You have new or worse trouble swallowing. · Your sore throat gets much worse on one side. Watch closely for changes in your health, and be sure to contact your doctor if you do not get better as expected. Where can you learn more? Go to http://estefania-gian.info/. Enter 062 [id number] 80 [id number] in the search box to learn more about \"Sore Throat: Care Instructions. \"  Current as of: July 29, 2016  Content Version: 11.2  © 9269-6473 [employer]. Care instructions adapted under license by [employer] (which disclaims liability or warranty for this information).  If you have questions about a medical condition or this instruction, always ask your healthcare professional. [employer] disclaims any warranty or liability for your use of this information.

## 2017-04-04 NOTE — ED NOTES
Assumed care of patient. Pt arrived to ED via triage with c/o \"itchy legs\" and sore throat. Pt has no other complaints. Pt has no rash noted to legs or raised bumps. Patient in no apparent distress at this time. Rails up x 2. Call bell within reach. Plan of care discussed. Wheels locked and bed low.

## 2017-04-06 LAB
BACTERIA SPEC CULT: NORMAL
SERVICE CMNT-IMP: NORMAL

## 2017-07-15 ENCOUNTER — HOSPITAL ENCOUNTER (EMERGENCY)
Age: 42
Discharge: HOME | End: 2017-07-15
Attending: EMERGENCY MEDICINE
Payer: MEDICAID

## 2017-07-15 VITALS
HEART RATE: 86 BPM | RESPIRATION RATE: 20 BRPM | TEMPERATURE: 98.4 F | DIASTOLIC BLOOD PRESSURE: 109 MMHG | SYSTOLIC BLOOD PRESSURE: 165 MMHG | OXYGEN SATURATION: 96 %

## 2017-07-15 DIAGNOSIS — F32.89 OTHER DEPRESSION: Primary | ICD-10-CM

## 2017-07-15 PROCEDURE — 99284 EMERGENCY DEPT VISIT MOD MDM: CPT

## 2017-07-16 NOTE — ED NOTES
Discharge instructions along with sandar crackers and ice given to patient. Patient ambulatory out of ED to waiting room, awaiting pickup from [facility] cab.

## 2017-07-16 NOTE — ED NOTES
Emergency Department Nursing Plan of Care       The Nursing Plan of Care is developed from the Nursing assessment and Emergency Department Attending provider initial evaluation. The plan of care may be reviewed in the ED Provider note.     The Plan of Care was developed with the following considerations:   Patient / Family readiness to learn indicated by:verbalized understanding  Persons(s) to be included in education: patient  Barriers to Learning/Limitations:No    [address], RN    7/15/2017   10:36 PM

## 2017-07-16 NOTE — ED TRIAGE NOTES
Pt arrived to ED with c/o mental health problem. Patient here with c/o \" I need some mediciene for my PTSD\". Pt falling asleep during assessment. Pt appears to be under the influence of a substance. Pt is alert and orientated X 4; skin is intact; lungs are clear; pt breaths well on room air; Pt is in no acute distress. Will continue to monitor.

## 2017-07-16 NOTE — ED NOTES
Pt verbally aggressive with discharge. Pt requested to leave without being seen and then rang the door and asked to come back in. Pt yelling at staff and cursing. Pt requesting discharge papers and crackers. MD made aware. Ashton at bedside.

## 2017-07-16 NOTE — ED NOTES
Patient yelling at staff \" I want to go, get me my paperwork\". Patient verbally abusive to this staff member yelling \" get out of here you bitch\". Irvin at bedside at this time. Dr. Samantha Cano notified of patient's wish for discharge.

## 2017-07-16 NOTE — ED PROVIDER NOTES
HPI Comments: Kaylan Damon is a 35 y.o. Female, with a pertinent PMHx of HTN, depression, and DM, who presents via EMS to the [facility] ED c/o a mental health problem today. Pt reports \"being against society. \" She additionally states she needs medication for her PTSD. Pt notes she had 2 beers to drink today. She states talking to a psychiatrist provided her with no relief of her mental issues. Pt states her last normal menstrual cycle was July 1st, 2017. Pt is currently on medication for HTN and DM. Pt denies being pregnant. Pt specifically denies any thyroid issues. Pt not complaining of any other sxs currently. Social hx: - Tobacco use, + EtOH use, - Illicit drug use    PCP: None    There are no other complaints, changes or physical findings at this time. The history is provided by the patient and the EMS personnel. No  was used. Past Medical History:   Diagnosis Date    Asthma     Diabetes ([address]     Hypertension     Psychiatric disorder     depression       Past Surgical History:   Procedure Laterality Date    HX TUBAL LIGATION           No family history on file. Social History     Social History    Marital status:      Spouse name: N/A    Number of children: N/A    Years of education: N/A     Occupational History    Not on file. Social History Main Topics    Smoking status: Never Smoker    Smokeless tobacco: Not on file    Alcohol use Yes    Drug use: No    Sexual activity: Yes     Partners: Male     Birth control/ protection: Surgical     Other Topics Concern    Not on file     Social History Narrative         ALLERGIES: Aspirin    Review of Systems   Constitutional: Negative for chills, diaphoresis, fever and unexpected weight change. HENT: Negative for congestion and sore throat. Respiratory: Negative for cough and shortness of breath. Cardiovascular: Negative for chest pain.         -ZHANG   Gastrointestinal: Negative for abdominal pain, diarrhea, nausea and vomiting. Genitourinary: Negative for dysuria, frequency and vaginal discharge. Musculoskeletal: Negative for arthralgias and neck pain. Skin: Negative for rash. -new lesions   Neurological: Negative for dizziness, syncope and headaches.        -focal weakness   Psychiatric/Behavioral: Positive for dysphoric mood. Vitals:    07/15/17 2120   BP: (!) 165/109   Pulse: 86   Resp: 20   Temp: 98.4 °F (36.9 °C)   SpO2: 96%            Physical Exam   Constitutional: She is oriented to person, place, and time. No distress. NAD   HENT:   Head: Normocephalic and atraumatic. Eyes: Right eye exhibits no discharge. Left eye exhibits no discharge. No conjunctival redness   Neck: Neck supple. No tracheal deviation present. Cardiovascular: Normal rate and regular rhythm. Exam reveals no gallop and no friction rub. No murmur heard. Pulmonary/Chest: She has no wheezes. She has no rhonchi. She has no rales. Clear to auscultation   Abdominal: Soft. Bowel sounds are normal. She exhibits no distension. There is no tenderness. Genitourinary:   Genitourinary Comments: No perineal lesions   Musculoskeletal:   Back: No point tenderness, no bony tenderness, no discoloration or swelling     Lymphadenopathy:     She has no cervical adenopathy. Neurological: She is alert and oriented to person, place, and time. No focal weakness, no changes in vision or hearing   Skin: Skin is warm. No lesion and no rash noted. She is not diaphoretic. Psychiatric: Thought content normal. She exhibits a depressed mood.    Cooperative        MDM  Number of Diagnoses or Management Options  Other depression:   Diagnosis management comments: DDx: Depression, substance induced mood disorder, PTSD, lack of proper housing       Amount and/or Complexity of Data Reviewed  Clinical lab tests: ordered and reviewed  Obtain history from someone other than the patient: yes (EMS)    Patient Progress  Patient progress: stable    ED Course       Procedures    PROGRESS NOTE:  10:08 PM  Pt has been re-evaluated. Pt is requesting to leave despite her plan. IMPRESSION:  1. Other depression        PLAN:  1. Discharge home  Follow-up Information     Follow up With Details Brandon Grimes MD   [address] [facility] [place] [address]  [phone]      Amy Quinn MD   [address] [facility] [place] [facility].  [phone]          Return to ED if worse     DISCHARGE NOTE  10:20 PM  The patient has been re-evaluated and is ready for discharge. Reviewed available results with patient. Counseled patient on diagnosis and care plan. Patient has expressed understanding, and all questions have been answered. Patient agrees with plan and agrees to follow up as recommended, or return to the ED if their symptoms worsen. Discharge instructions have been provided and explained to the patient, along with reasons to return to the ED. ATTESTATION:  This note is prepared by Magi Martins, acting as Scribe for Gerard Hernandez MD.    Gerard Hernandez MD: The scribe's documentation has been prepared under my direction and personally reviewed by me in its entirety. I confirm that the note above accurately reflects all work, treatment, procedures, and medical decision making performed by me.

## 2017-07-18 ENCOUNTER — APPOINTMENT (OUTPATIENT)
Dept: GENERAL RADIOLOGY | Age: 42
End: 2017-07-18
Attending: EMERGENCY MEDICINE
Payer: MEDICAID

## 2017-07-18 ENCOUNTER — HOSPITAL ENCOUNTER (OUTPATIENT)
Age: 42
Setting detail: OBSERVATION
Discharge: HOME | End: 2017-07-20
Attending: EMERGENCY MEDICINE | Admitting: EMERGENCY MEDICINE
Payer: MEDICAID

## 2017-07-18 DIAGNOSIS — T40.5X4A: ICD-10-CM

## 2017-07-18 DIAGNOSIS — F19.10 SUBSTANCE ABUSE (HCC): ICD-10-CM

## 2017-07-18 DIAGNOSIS — R11.2 INTRACTABLE VOMITING WITH NAUSEA, UNSPECIFIED VOMITING TYPE: Primary | ICD-10-CM

## 2017-07-18 PROBLEM — R45.851 SUICIDAL IDEATIONS: Status: ACTIVE | Noted: 2017-07-18

## 2017-07-18 PROBLEM — F14.10 COCAINE ABUSE (HCC): Status: ACTIVE | Noted: 2017-07-18

## 2017-07-18 PROBLEM — J45.901 ASTHMA WITH ACUTE EXACERBATION: Status: ACTIVE | Noted: 2017-07-18

## 2017-07-18 LAB
ALBUMIN SERPL BCP-MCNC: 3.5 G/DL (ref 3.5–5)
ALBUMIN/GLOB SERPL: 0.8 {RATIO} (ref 1.1–2.2)
ALP SERPL-CCNC: 79 U/L (ref 45–117)
ALT SERPL-CCNC: 86 U/L (ref 12–78)
AMPHET UR QL SCN: NEGATIVE
ANION GAP BLD CALC-SCNC: 10 MMOL/L (ref 5–15)
APPEARANCE UR: CLEAR
AST SERPL W P-5'-P-CCNC: 146 U/L (ref 15–37)
BACTERIA URNS QL MICRO: NEGATIVE /HPF
BARBITURATES UR QL SCN: NEGATIVE
BASOPHILS # BLD AUTO: 0 K/UL (ref 0–0.1)
BASOPHILS # BLD: 0 % (ref 0–1)
BENZODIAZ UR QL: NEGATIVE
BILIRUB SERPL-MCNC: 1 MG/DL (ref 0.2–1)
BILIRUB UR QL: NEGATIVE
BUN SERPL-MCNC: 5 MG/DL (ref 6–20)
BUN/CREAT SERPL: 6 (ref 12–20)
CALCIUM SERPL-MCNC: 8.5 MG/DL (ref 8.5–10.1)
CANNABINOIDS UR QL SCN: NEGATIVE
CHLORIDE SERPL-SCNC: 103 MMOL/L (ref 97–108)
CO2 SERPL-SCNC: 28 MMOL/L (ref 21–32)
COCAINE UR QL SCN: POSITIVE
COLOR UR: NORMAL
CREAT SERPL-MCNC: 0.84 MG/DL (ref 0.55–1.02)
DIFFERENTIAL METHOD BLD: ABNORMAL
DRUG SCRN COMMENT,DRGCM: ABNORMAL
EOSINOPHIL # BLD: 0.1 K/UL (ref 0–0.4)
EOSINOPHIL NFR BLD: 1 % (ref 0–7)
EPITH CASTS URNS QL MICRO: NORMAL /LPF
ERYTHROCYTE [DISTWIDTH] IN BLOOD BY AUTOMATED COUNT: 19.5 % (ref 11.5–14.5)
ETHANOL SERPL-MCNC: <10 MG/DL
GLOBULIN SER CALC-MCNC: 4.4 G/DL (ref 2–4)
GLUCOSE SERPL-MCNC: 141 MG/DL (ref 65–100)
GLUCOSE UR STRIP.AUTO-MCNC: NEGATIVE MG/DL
HCG SERPL QL: NEGATIVE
HCT VFR BLD AUTO: 32.2 % (ref 35–47)
HGB BLD-MCNC: 9.1 G/DL (ref 11.5–16)
HGB UR QL STRIP: NEGATIVE
KETONES UR QL STRIP.AUTO: NEGATIVE MG/DL
LEUKOCYTE ESTERASE UR QL STRIP.AUTO: NEGATIVE
LIPASE SERPL-CCNC: 87 U/L (ref 73–393)
LYMPHOCYTES # BLD AUTO: 18 % (ref 12–49)
LYMPHOCYTES # BLD: 1 K/UL (ref 0.8–3.5)
MCH RBC QN AUTO: 17.4 PG (ref 26–34)
MCHC RBC AUTO-ENTMCNC: 28.3 G/DL (ref 30–36.5)
MCV RBC AUTO: 61.6 FL (ref 80–99)
METHADONE UR QL: NEGATIVE
MONOCYTES # BLD: 0.4 K/UL (ref 0–1)
MONOCYTES NFR BLD AUTO: 8 % (ref 5–13)
NEUTS SEG # BLD: 4 K/UL (ref 1.8–8)
NEUTS SEG NFR BLD AUTO: 73 % (ref 32–75)
NITRITE UR QL STRIP.AUTO: NEGATIVE
OPIATES UR QL: NEGATIVE
PCP UR QL: NEGATIVE
PH UR STRIP: 7 [PH] (ref 5–8)
PLATELET # BLD AUTO: 250 K/UL (ref 150–400)
POTASSIUM SERPL-SCNC: 3.5 MMOL/L (ref 3.5–5.1)
PROT SERPL-MCNC: 7.9 G/DL (ref 6.4–8.2)
PROT UR STRIP-MCNC: NEGATIVE MG/DL
RBC # BLD AUTO: 5.23 M/UL (ref 3.8–5.2)
RBC #/AREA URNS HPF: NORMAL /HPF (ref 0–5)
RBC MORPH BLD: ABNORMAL
RBC MORPH BLD: ABNORMAL
SODIUM SERPL-SCNC: 141 MMOL/L (ref 136–145)
SP GR UR REFRACTOMETRY: <1.005 (ref 1–1.03)
TROPONIN I SERPL-MCNC: <0.04 NG/ML
UA: UC IF INDICATED,UAUC: NORMAL
UROBILINOGEN UR QL STRIP.AUTO: 1 EU/DL (ref 0.2–1)
WBC # BLD AUTO: 5.5 K/UL (ref 3.6–11)
WBC URNS QL MICRO: NORMAL /HPF (ref 0–4)

## 2017-07-18 PROCEDURE — 77030029684 HC NEB SM VOL KT MONA -A

## 2017-07-18 PROCEDURE — 74011250637 HC RX REV CODE- 250/637: Performed by: EMERGENCY MEDICINE

## 2017-07-18 PROCEDURE — 96375 TX/PRO/DX INJ NEW DRUG ADDON: CPT

## 2017-07-18 PROCEDURE — 94640 AIRWAY INHALATION TREATMENT: CPT

## 2017-07-18 PROCEDURE — 71010 XR CHEST PORT: CPT

## 2017-07-18 PROCEDURE — 83690 ASSAY OF LIPASE: CPT | Performed by: EMERGENCY MEDICINE

## 2017-07-18 PROCEDURE — 74011250636 HC RX REV CODE- 250/636: Performed by: EMERGENCY MEDICINE

## 2017-07-18 PROCEDURE — 99218 HC RM OBSERVATION: CPT

## 2017-07-18 PROCEDURE — 80307 DRUG TEST PRSMV CHEM ANLYZR: CPT | Performed by: EMERGENCY MEDICINE

## 2017-07-18 PROCEDURE — 74011000250 HC RX REV CODE- 250: Performed by: EMERGENCY MEDICINE

## 2017-07-18 PROCEDURE — 81001 URINALYSIS AUTO W/SCOPE: CPT | Performed by: EMERGENCY MEDICINE

## 2017-07-18 PROCEDURE — 96361 HYDRATE IV INFUSION ADD-ON: CPT

## 2017-07-18 PROCEDURE — 96374 THER/PROPH/DIAG INJ IV PUSH: CPT

## 2017-07-18 PROCEDURE — 83735 ASSAY OF MAGNESIUM: CPT | Performed by: PSYCHIATRY & NEUROLOGY

## 2017-07-18 PROCEDURE — 84484 ASSAY OF TROPONIN QUANT: CPT | Performed by: EMERGENCY MEDICINE

## 2017-07-18 PROCEDURE — 80053 COMPREHEN METABOLIC PANEL: CPT | Performed by: EMERGENCY MEDICINE

## 2017-07-18 PROCEDURE — 85025 COMPLETE CBC W/AUTO DIFF WBC: CPT | Performed by: EMERGENCY MEDICINE

## 2017-07-18 PROCEDURE — 36415 COLL VENOUS BLD VENIPUNCTURE: CPT | Performed by: EMERGENCY MEDICINE

## 2017-07-18 PROCEDURE — 84703 CHORIONIC GONADOTROPIN ASSAY: CPT | Performed by: EMERGENCY MEDICINE

## 2017-07-18 PROCEDURE — 99285 EMERGENCY DEPT VISIT HI MDM: CPT

## 2017-07-18 PROCEDURE — 93005 ELECTROCARDIOGRAM TRACING: CPT

## 2017-07-18 RX ORDER — LISINOPRIL 20 MG/1
20 TABLET ORAL DAILY
Status: DISCONTINUED | OUTPATIENT
Start: 2017-07-19 | End: 2017-07-20 | Stop reason: HOSPADM

## 2017-07-18 RX ORDER — ACETAMINOPHEN 325 MG/1
650 TABLET ORAL
Status: DISCONTINUED | OUTPATIENT
Start: 2017-07-18 | End: 2017-07-20 | Stop reason: HOSPADM

## 2017-07-18 RX ORDER — SODIUM CHLORIDE 0.9 % (FLUSH) 0.9 %
5-10 SYRINGE (ML) INJECTION EVERY 8 HOURS
Status: DISCONTINUED | OUTPATIENT
Start: 2017-07-18 | End: 2017-07-20 | Stop reason: HOSPADM

## 2017-07-18 RX ORDER — LORAZEPAM 2 MG/ML
1 INJECTION INTRAMUSCULAR
Status: DISCONTINUED | OUTPATIENT
Start: 2017-07-18 | End: 2017-07-20 | Stop reason: HOSPADM

## 2017-07-18 RX ORDER — LABETALOL HYDROCHLORIDE 5 MG/ML
20 INJECTION, SOLUTION INTRAVENOUS
Status: COMPLETED | OUTPATIENT
Start: 2017-07-18 | End: 2017-07-18

## 2017-07-18 RX ORDER — SODIUM CHLORIDE 0.9 % (FLUSH) 0.9 %
5-10 SYRINGE (ML) INJECTION AS NEEDED
Status: DISCONTINUED | OUTPATIENT
Start: 2017-07-18 | End: 2017-07-20 | Stop reason: HOSPADM

## 2017-07-18 RX ORDER — METOCLOPRAMIDE HYDROCHLORIDE 5 MG/ML
10 INJECTION INTRAMUSCULAR; INTRAVENOUS
Status: COMPLETED | OUTPATIENT
Start: 2017-07-18 | End: 2017-07-18

## 2017-07-18 RX ORDER — IPRATROPIUM BROMIDE AND ALBUTEROL SULFATE 2.5; .5 MG/3ML; MG/3ML
3 SOLUTION RESPIRATORY (INHALATION)
Status: COMPLETED | OUTPATIENT
Start: 2017-07-18 | End: 2017-07-18

## 2017-07-18 RX ORDER — FLUTICASONE PROPIONATE AND SALMETEROL 100; 50 UG/1; UG/1
1 POWDER RESPIRATORY (INHALATION) EVERY 12 HOURS
Status: DISCONTINUED | OUTPATIENT
Start: 2017-07-18 | End: 2017-07-19

## 2017-07-18 RX ORDER — LORATADINE 10 MG/1
10 TABLET ORAL DAILY
Status: DISCONTINUED | OUTPATIENT
Start: 2017-07-19 | End: 2017-07-20 | Stop reason: HOSPADM

## 2017-07-18 RX ORDER — ONDANSETRON 2 MG/ML
4 INJECTION INTRAMUSCULAR; INTRAVENOUS
Status: DISCONTINUED | OUTPATIENT
Start: 2017-07-18 | End: 2017-07-20 | Stop reason: HOSPADM

## 2017-07-18 RX ORDER — IPRATROPIUM BROMIDE AND ALBUTEROL SULFATE 2.5; .5 MG/3ML; MG/3ML
3 SOLUTION RESPIRATORY (INHALATION)
Status: DISCONTINUED | OUTPATIENT
Start: 2017-07-18 | End: 2017-07-20 | Stop reason: HOSPADM

## 2017-07-18 RX ORDER — TRAZODONE HYDROCHLORIDE 50 MG/1
50 TABLET ORAL
Status: DISCONTINUED | OUTPATIENT
Start: 2017-07-18 | End: 2017-07-19

## 2017-07-18 RX ORDER — FLUTICASONE PROPIONATE 50 MCG
2 SPRAY, SUSPENSION (ML) NASAL DAILY
Status: DISCONTINUED | OUTPATIENT
Start: 2017-07-19 | End: 2017-07-19

## 2017-07-18 RX ORDER — SODIUM CHLORIDE 9 MG/ML
150 INJECTION, SOLUTION INTRAVENOUS CONTINUOUS
Status: DISCONTINUED | OUTPATIENT
Start: 2017-07-18 | End: 2017-07-19

## 2017-07-18 RX ORDER — LORAZEPAM 2 MG/ML
1 INJECTION INTRAMUSCULAR
Status: COMPLETED | OUTPATIENT
Start: 2017-07-18 | End: 2017-07-18

## 2017-07-18 RX ORDER — HYDRALAZINE HYDROCHLORIDE 20 MG/ML
20 INJECTION INTRAMUSCULAR; INTRAVENOUS
Status: DISCONTINUED | OUTPATIENT
Start: 2017-07-18 | End: 2017-07-20 | Stop reason: HOSPADM

## 2017-07-18 RX ORDER — TRAMADOL HYDROCHLORIDE 50 MG/1
50 TABLET ORAL
Status: DISCONTINUED | OUTPATIENT
Start: 2017-07-18 | End: 2017-07-19

## 2017-07-18 RX ADMIN — SODIUM CHLORIDE 150 ML/HR: 900 INJECTION, SOLUTION INTRAVENOUS at 23:16

## 2017-07-18 RX ADMIN — Medication 10 ML: at 23:17

## 2017-07-18 RX ADMIN — SODIUM CHLORIDE 150 ML/HR: 900 INJECTION, SOLUTION INTRAVENOUS at 21:46

## 2017-07-18 RX ADMIN — TRAZODONE HYDROCHLORIDE 50 MG: 50 TABLET, FILM COATED ORAL at 23:14

## 2017-07-18 RX ADMIN — LABETALOL HYDROCHLORIDE 20 MG: 5 INJECTION INTRAVENOUS at 17:28

## 2017-07-18 RX ADMIN — SODIUM CHLORIDE 1000 ML: 900 INJECTION, SOLUTION INTRAVENOUS at 16:46

## 2017-07-18 RX ADMIN — TRAMADOL HYDROCHLORIDE 50 MG: 50 TABLET, FILM COATED ORAL at 23:14

## 2017-07-18 RX ADMIN — IPRATROPIUM BROMIDE AND ALBUTEROL SULFATE 3 ML: .5; 3 SOLUTION RESPIRATORY (INHALATION) at 23:37

## 2017-07-18 RX ADMIN — IPRATROPIUM BROMIDE AND ALBUTEROL SULFATE 3 ML: .5; 3 SOLUTION RESPIRATORY (INHALATION) at 18:46

## 2017-07-18 RX ADMIN — METOCLOPRAMIDE 10 MG: 5 INJECTION, SOLUTION INTRAMUSCULAR; INTRAVENOUS at 16:50

## 2017-07-18 RX ADMIN — LORAZEPAM 1 MG: 2 INJECTION INTRAMUSCULAR; INTRAVENOUS at 18:34

## 2017-07-18 NOTE — ED NOTES
Pt told me that she is homeless and she smokes crack, has no support of family or friends, she is suicidal, she was here two days ago for SI and they discharged her.

## 2017-07-18 NOTE — ED NOTES
Pt given a new gown and clean sheet after she urinated on herself while making a lot of noise and bearing down as if to vomit without actually vomiting.

## 2017-07-18 NOTE — IP AVS SNAPSHOT
[facility] 
 
 
 [address] Patient: Real Bauer MRN: [id number] :1975 You are allergic to the following Allergen Reactions Aspirin Rash Patient denies allergy, sometimes itches Recent Documentation Height Weight Breastfeeding? BMI OB Status Smoking Status 1.575 m 132.7 kg No 53.52 kg/m2 Having regular periods Never Smoker Emergency Contacts Name Discharge Info Relation Home Work Mobile Juana Lundberg  Other Relative [6] [phone] Dylan   Spouse [3] [phone] About your hospitalization You were admitted on:  2017 You last received care in the:  [address] You were discharged on:  2017 Unit phone number:  [phone] Why you were hospitalized Your primary diagnosis was:  Nausea & Vomiting Your diagnoses also included:  Cocaine Abuse, Asthma With Acute Exacerbation, Suicidal Ideations, Polysubstance Dependence (Hcc), Substance Induced Mood Disorder (Hcc), Cocaine Intoxication (Hcc), Alcohol Withdrawal Syndrome With Complication (Hcc) Providers Seen During Your Hospitalizations Provider Role Specialty Primary office phone Hernando Barbour MD Attending Provider Emergency Medicine [phone] Uzma Bradshaw MD Attending Provider Emergency Medicine [phone] Nelia Yi MD Attending Provider Internal Medicine [phone] Your Primary Care Physician (PCP) Primary Care Physician Office Phone Office Fax NONE ** None ** ** None ** Follow-up Information Follow up With Details Comments Contact Info Sindi Tam MD Schedule an appointment as soon as possible for a visit in 1 week  [address] [place] [address] [phone] 
[phone] [employer]   [address] [place] [address] [phone] Current Discharge Medication List  
  
START taking these medications Dose & Instructions Dispensing Information Comments Morning Noon Evening Bedtime  
 folic acid 1 mg tablet Commonly known as:  Google Your last dose was: Your next dose is:    
   
   
 Dose:  1 mg Take 1 Tab by mouth daily. Quantity:  30 Tab Refills:  0  
     
   
   
   
  
 gabapentin 300 mg capsule Commonly known as:  NEURONTIN Your last dose was: Your next dose is:    
   
   
 Dose:  300 mg Take 1 Cap by mouth three (3) times daily for 3 days. Quantity:  9 Cap Refills:  0  
     
   
   
   
  
 thiamine 100 mg tablet Commonly known as:  B-1 Your last dose was: Your next dose is:    
   
   
 Dose:  100 mg Take 1 Tab by mouth daily. Quantity:  30 Tab Refills:  0 CONTINUE these medications which have CHANGED Dose & Instructions Dispensing Information Comments Morning Noon Evening Bedtime  
 glimepiride 4 mg tablet Commonly known as:  AMARYL What changed:  when to take this Your last dose was: Your next dose is:    
   
   
 Dose:  4 mg Take 1 Tab by mouth every morning. Quantity:  30 Tab Refills:  0 CONTINUE these medications which have NOT CHANGED Dose & Instructions Dispensing Information Comments Morning Noon Evening Bedtime ADVAIR DISKUS 250-50 mcg/dose diskus inhaler Generic drug:  fluticasone-salmeterol Your last dose was: Your next dose is:    
   
   
 Dose:  1 Puff Take 1 Puff by inhalation two (2) times a day. Refills:  0  
     
   
   
   
  
 albuterol 90 mcg/actuation inhaler Commonly known as:  Devika Schwalbe Your last dose was: Your next dose is:    
   
   
 Dose:  1-2 Puff Take 1-2 Puffs by inhalation every four (4) hours as needed for Wheezing or Shortness of Breath. Quantity:  17 g Refills:  0  
     
   
   
   
  
 albuterol-ipratropium 2.5 mg-0.5 mg/3 ml Nebu Commonly known as:  Mary Benson Your last dose was: Your next dose is:    
   
   
 Dose:  3 mL  
3 mL by Nebulization route every four (4) hours as needed. Quantity:  30 Nebule Refills:  0  
     
   
   
   
  
 lisinopril 20 mg tablet Commonly known as:  Karlie Edwards Your last dose was: Your next dose is:    
   
   
 Dose:  20 mg Take 1 Tab by mouth daily. Quantity:  30 Tab Refills:  0  
     
   
   
   
  
 loratadine 10 mg tablet Commonly known as:  Kip Manual Your last dose was: Your next dose is:    
   
   
 Dose:  10 mg Take 1 Tab by mouth daily. Quantity:  20 Tab Refills:  0  
     
   
   
   
  
 metFORMIN 1,000 mg tablet Commonly known as:  GLUCOPHAGE Your last dose was: Your next dose is:    
   
   
 Dose:  1000 mg Take 1 Tab by mouth two (2) times daily (with meals). Quantity:  60 Tab Refills:  0  
     
   
   
   
  
 verapamil  mg ER capsule Commonly known as:  Nicho Gutiérrez Your last dose was: Your next dose is:    
   
   
 Dose:  120 mg Take 1 Cap by mouth daily. Quantity:  30 Cap Refills:  0 Where to Get Your Medications Information on where to get these meds will be given to you by the nurse or doctor. ! Ask your nurse or doctor about these medications  
  albuterol 90 mcg/actuation inhaler  
 albuterol-ipratropium 2.5 mg-0.5 mg/3 ml Nebu  
 folic acid 1 mg tablet  
 gabapentin 300 mg capsule  
 glimepiride 4 mg tablet  
 lisinopril 20 mg tablet  
 metFORMIN 1,000 mg tablet  
 thiamine 100 mg tablet  
 verapamil  mg ER capsule Discharge Instructions None Discharge Orders None MyChart Announcement  We are excited to announce that we are making your provider's discharge notes available to you in [employer]. You will see these notes when they are completed and signed by the physician that discharged you from your recent hospital stay. If you have any questions or concerns about any information you see in [employer], please call the Health Information Department where you were seen or reach out to your Primary Care Provider for more information about your plan of care. Introducing [facility] SERVICES! Tennille Vega introduces [employer] patient portal. Now you can access parts of your medical record, email your doctor's office, and request medication refills online. 1. In your internet browser, go to [email or web address] 2. Click on the First Time User? Click Here link in the Sign In box. You will see the New Member Sign Up page. 3. Enter your [employer] Access Code exactly as it appears below. You will not need to use this code after youve completed the sign-up process. If you do not sign up before the expiration date, you must request a new code. · [employer] Access Code: [email or web address] Expires: 9/16/2017  3:47 PM 
 
4. Enter the last four digits of your Social Security Number (xxxx) and Date of Birth (mm/dd/yyyy) as indicated and click Submit. You will be taken to the next sign-up page. 5. Create a [employer] ID. This will be your [employer] login ID and cannot be changed, so think of one that is secure and easy to remember. 6. Create a [employer] password. You can change your password at any time. 7. Enter your Password Reset Question and Answer. This can be used at a later time if you forget your password. 8. Enter your e-mail address. You will receive e-mail notification when new information is available in [address] 9. Click Sign Up. You can now view and download portions of your medical record. 10. Click the Download Summary menu link to download a portable copy of your medical information. If you have questions, please visit the Frequently Asked Questions section of the MyChart website. Remember, MyChart is NOT to be used for urgent needs. For medical emergencies, dial 911. Now available from your iPhone and Android! General Information Please provide this summary of care documentation to your next provider. Patient Signature:  ____________________________________________________________ Date:  ____________________________________________________________  
  
Lukasz Ala Provider Signature:  ____________________________________________________________ Date:  ____________________________________________________________

## 2017-07-18 NOTE — ED PROVIDER NOTES
HPI Comments: Damián Santillan is a 35 y.o. female with PMhx significant for asthma, HTN, depression, and DM who presents via EMS to the ED with cc of constant nausea and intermittent vomiting beginning today. Pt estimates ~6 episodes of non-bloody, non-bilious emesis. She denies any recent abnormal or unusual foods that could contribute to her current symptoms. Pt reports EtOH use, but denies any tobacco and illicit drug use. Pt specifically denies any abdominal pain, diarrhea, chest pain, and SOB. PCP: None    There are no other complaints, changes or physical findings at this time. The history is provided by the patient. Past Medical History:   Diagnosis Date    Asthma     Diabetes ([facility] [address]     Hypertension     Psychiatric disorder     depression       Past Surgical History:   Procedure Laterality Date    HX TUBAL LIGATION           No family history on file. Social History     Social History    Marital status:      Spouse name: N/A    Number of children: N/A    Years of education: N/A     Occupational History    Not on file. Social History Main Topics    Smoking status: Never Smoker    Smokeless tobacco: Never Used    Alcohol use Yes      Comment: 2 or 3 40's a day 7/18/17    Drug use: No    Sexual activity: Yes     Partners: Male     Birth control/ protection: Surgical     Other Topics Concern    Not on file     Social History Narrative         ALLERGIES: Aspirin    Review of Systems   Constitutional: Negative for fever. HENT: Negative for sore throat. Eyes: Negative for photophobia and redness. Respiratory: Negative for shortness of breath and wheezing. Cardiovascular: Negative for chest pain and leg swelling. Gastrointestinal: Positive for nausea and vomiting. Negative for abdominal pain, blood in stool and diarrhea. Genitourinary: Negative for difficulty urinating, dysuria, hematuria, menstrual problem and vaginal bleeding.    Musculoskeletal: Negative for back pain and joint swelling. Neurological: Negative for dizziness, seizures, syncope, speech difficulty, weakness, numbness and headaches. Hematological: Negative for adenopathy. Psychiatric/Behavioral: Negative for agitation, confusion and suicidal ideas. The patient is not nervous/anxious. Patient Vitals for the past 12 hrs:   Temp Pulse Resp BP SpO2   07/18/17 1908 - 75 22 (!) 146/104 99 %   07/18/17 1835 98.1 °F (36.7 °C) 74 24 - 99 %   07/18/17 1820 - - - (!) 190/100 100 %   07/18/17 1729 - 81 26 - -   07/18/17 1728 - 88 - (!) 210/106 -   07/18/17 1724 - 81 - (!) 211/103 96 %   07/18/17 1657 - 77 22 - 97 %   07/18/17 1607 98.6 °F (37 °C) (!) 123 23 (!) 201/106 99 %     Physical Exam   Constitutional: She is oriented to person, place, and time. She appears well-developed and well-nourished. Mild distress due to nausea and vomiting   HENT:   Head: Normocephalic and atraumatic. Mouth/Throat: Oropharynx is clear and moist. No oropharyngeal exudate. Eyes: Conjunctivae and EOM are normal. Pupils are equal, round, and reactive to light. Left eye exhibits no discharge. Neck: Normal range of motion. Neck supple. No JVD present. Cardiovascular: Normal rate, regular rhythm, normal heart sounds and intact distal pulses. Pulmonary/Chest: Effort normal and breath sounds normal. No respiratory distress. She has no wheezes. Abdominal: Soft. Bowel sounds are normal. She exhibits no distension. There is no tenderness. There is no rebound and no guarding. Musculoskeletal: Normal range of motion. She exhibits no edema or tenderness. Lymphadenopathy:     She has no cervical adenopathy. Neurological: She is alert and oriented to person, place, and time. She has normal reflexes. No cranial nerve deficit. Skin: Skin is warm and dry. No rash noted. Psychiatric: She has a normal mood and affect. Her behavior is normal.   Nursing note and vitals reviewed.        MDM  Number of Diagnoses or Management Options  Intractable vomiting with nausea, unspecified vomiting type:   Poisoning by cocaine, undetermined intent, initial encounter:   Substance abuse:   Diagnosis management comments: DDx: gastritis, pancreatitis, gastroenteritis, uncontrolled HTN, atypical chest pain       Amount and/or Complexity of Data Reviewed  Clinical lab tests: ordered and reviewed  Tests in the radiology section of CPT®: ordered and reviewed  Tests in the medicine section of CPT®: ordered and reviewed  Review and summarize past medical records: yes  Discuss the patient with other providers: yes [facility]  Hospitalist)  Independent visualization of images, tracings, or specimens: yes    Patient Progress  Patient progress: stable    ED Course       Procedures    EKG interpretation: (Preliminary)  4:28 PM  Rhythm: normal sinus rhythm; and regular . Rate (approx.): 82; Axis: normal; OH interval: normal; QRS interval: normal ; ST/T wave: normal; Other findings: normal.    PROGRESS NOTE:  6:02 PM  Pt now reports SI. She notes she is homeless and smokes crack cocaine. Will consult with [facility]. Written by [facility], ED Scribe, as dictated by Hollie Davies MD.    CONSULT NOTE:   Arias Knott MD spoke with Frank Laughlin   Specialty: Katelyn Luis  Discussed pt's hx, disposition, and available diagnostic and imaging results. Reviewed care plan. She states she received a call from [facility] first, but will come evaluate pt afterwards. Written by [facility], ED Scribe, as dictated by Hollie Davies MD.    PROGRESS NOTE:  6:39 PM  Pt reports her asthma is acting up. Will order neb treatment. Written by [facility], ED Scribe, as dictated by Hollie Davies MD.    CONSULT NOTE:   7:49 Crow Bianchi MD spoke with Archie Vee MD   Specialty: Hospitalist  Discussed pt's hx, disposition, and available diagnostic and imaging results. Reviewed care plans. Consultant will evaluate pt for admission.   Written by [facility] ED Scribe, as dictated by Rosalio Hall MD.    LABORATORY TESTS:  Recent Results (from the past 12 hour(s))   EKG, 12 LEAD, INITIAL    Collection Time: 07/18/17  4:28 PM   Result Value Ref Range    Ventricular Rate 82 BPM    Atrial Rate 82 BPM    P-R Interval 144 ms    QRS Duration 86 ms    Q-T Interval 394 ms    QTC Calculation (Bezet) 460 ms    Calculated P Axis 29 degrees    Calculated R Axis -14 degrees    Calculated T Axis 44 degrees    Diagnosis       Normal sinus rhythm  Normal ECG  When compared with ECG of 17-MAY-2016 17:34,  No significant change was found     CBC WITH AUTOMATED DIFF    Collection Time: 07/18/17  4:36 PM   Result Value Ref Range    WBC 5.5 3.6 - 11.0 K/uL    RBC 5.23 (H) 3.80 - 5.20 M/uL    HGB 9.1 (L) 11.5 - 16.0 g/dL    HCT 32.2 (L) 35.0 - 47.0 %    MCV 61.6 (L) 80.0 - 99.0 FL    MCH 17.4 (L) 26.0 - 34.0 PG    MCHC 28.3 (L) 30.0 - 36.5 g/dL    RDW 19.5 (H) 11.5 - 14.5 %    PLATELET 630 012 - 642 K/uL    NEUTROPHILS 73 32 - 75 %    LYMPHOCYTES 18 12 - 49 %    MONOCYTES 8 5 - 13 %    EOSINOPHILS 1 0 - 7 %    BASOPHILS 0 0 - 1 %    ABS. NEUTROPHILS 4.0 1.8 - 8.0 K/UL    ABS. LYMPHOCYTES 1.0 0.8 - 3.5 K/UL    ABS. MONOCYTES 0.4 0.0 - 1.0 K/UL    ABS. EOSINOPHILS 0.1 0.0 - 0.4 K/UL    ABS.  BASOPHILS 0.0 0.0 - 0.1 K/UL    DF SMEAR SCANNED      RBC COMMENTS ANISOCYTOSIS  1+        RBC COMMENTS HYPOCHROMIA  4+       HCG QL SERUM    Collection Time: 07/18/17  4:36 PM   Result Value Ref Range    HCG, Ql. NEGATIVE  NEG     METABOLIC PANEL, COMPREHENSIVE    Collection Time: 07/18/17  4:36 PM   Result Value Ref Range    Sodium 141 136 - 145 mmol/L    Potassium 3.5 3.5 - 5.1 mmol/L    Chloride 103 97 - 108 mmol/L    CO2 28 21 - 32 mmol/L    Anion gap 10 5 - 15 mmol/L    Glucose 141 (H) 65 - 100 mg/dL    BUN 5 (L) 6 - 20 MG/DL    Creatinine 0.84 0.55 - 1.02 MG/DL    BUN/Creatinine ratio 6 (L) 12 - 20      GFR est AA >60 >60 ml/min/1.73m2    GFR est non-AA >60 >60 ml/min/1.73m2    Calcium 8.5 8.5 - 10.1 MG/DL    Bilirubin, total 1.0 0.2 - 1.0 MG/DL    ALT (SGPT) 86 (H) 12 - 78 U/L    AST (SGOT) 146 (H) 15 - 37 U/L    Alk. phosphatase 79 45 - 117 U/L    Protein, total 7.9 6.4 - 8.2 g/dL    Albumin 3.5 3.5 - 5.0 g/dL    Globulin 4.4 (H) 2.0 - 4.0 g/dL    A-G Ratio 0.8 (L) 1.1 - 2.2     LIPASE    Collection Time: 07/18/17  4:36 PM   Result Value Ref Range    Lipase 87 73 - 393 U/L   TROPONIN I    Collection Time: 07/18/17  4:36 PM   Result Value Ref Range    Troponin-I, Qt. <0.04 <0.05 ng/mL   URINALYSIS W/ REFLEX CULTURE    Collection Time: 07/18/17  4:36 PM   Result Value Ref Range    Color YELLOW/STRAW      Appearance CLEAR CLEAR      Specific gravity <1.005 1.003 - 1.030    pH (UA) 7.0 5.0 - 8.0      Protein NEGATIVE  NEG mg/dL    Glucose NEGATIVE  NEG mg/dL    Ketone NEGATIVE  NEG mg/dL    Bilirubin NEGATIVE  NEG      Blood NEGATIVE  NEG      Urobilinogen 1.0 0.2 - 1.0 EU/dL    Nitrites NEGATIVE  NEG      Leukocyte Esterase NEGATIVE  NEG      WBC 0-4 0 - 4 /hpf    RBC 0-5 0 - 5 /hpf    Epithelial cells FEW FEW /lpf    Bacteria NEGATIVE  NEG /hpf    UA:UC IF INDICATED CULTURE NOT INDICATED BY UA RESULT CNI     DRUG SCREEN, URINE    Collection Time: 07/18/17  5:10 PM   Result Value Ref Range    AMPHETAMINES NEGATIVE  NEG      BARBITURATES NEGATIVE  NEG      BENZODIAZEPINE NEGATIVE  NEG      COCAINE POSITIVE (A) NEG      METHADONE NEGATIVE  NEG      OPIATES NEGATIVE  NEG      PCP(PHENCYCLIDINE) NEGATIVE  NEG      THC (TH-CANNABINOL) NEGATIVE  NEG      Drug screen comment (NOTE)    ETHYL ALCOHOL    Collection Time: 07/18/17  6:43 PM   Result Value Ref Range    ALCOHOL(ETHYL),SERUM <10 <10 MG/DL       IMAGING RESULTS:  CXR Results  (Last 48 hours)               07/18/17 1807  XR CHEST PORT Final result    Impression:  IMPRESSION: No acute cardiopulmonary disease. Narrative:  INDICATION: Tachypnea       Portable AP view of the chest.       Direct comparison made to prior chest x-ray dated April 4, 2017. Cardiomediastinal silhouette is stable. Lungs are clear bilaterally. Pleural   spaces are normal. Osseous structures are intact. MEDICATIONS GIVEN:  Medications   sodium chloride (NS) flush 5-10 mL (not administered)   sodium chloride (NS) flush 5-10 mL (not administered)   metoclopramide HCl (REGLAN) injection 10 mg (10 mg IntraVENous Given 7/18/17 1650)   sodium chloride 0.9 % bolus infusion 1,000 mL (1,000 mL IntraVENous New Bag 7/18/17 1646)   labetalol (NORMODYNE;TRANDATE) injection 20 mg (20 mg IntraVENous Given 7/18/17 1728)   LORazepam (ATIVAN) injection 1 mg (1 mg IntraVENous Given 7/18/17 1834)   albuterol-ipratropium (DUO-NEB) 2.5 MG-0.5 MG/3 ML (3 mL Nebulization Given 7/18/17 1846)       IMPRESSION:  1. Intractable vomiting with nausea, unspecified vomiting type    2. Poisoning by cocaine, undetermined intent, initial encounter    3. Substance abuse        PLAN:  1. Admit to hospitalist    ADMIT NOTE:  7:49 PM  The patient is being admitted to the hospital by Dr. Radha Seals. The results of their tests and reasons for their admission have been discussed with the patient and/or available family. They convey agreement and understanding for the need to be admitted and for their admission diagnosis. This note is prepared by Jaya Shaw, acting as Scribe for Rigoberto Franco MD.    Rigoberto Franco MD: The scribe's documentation has been prepared under my direction and personally reviewed by me in its entirety. I confirm that the note above accurately reflects all work, treatment, procedures, and medical decision making performed by me.

## 2017-07-19 PROBLEM — F19.94 SUBSTANCE INDUCED MOOD DISORDER (HCC): Status: ACTIVE | Noted: 2017-07-19

## 2017-07-19 PROBLEM — F14.929 COCAINE INTOXICATION (HCC): Status: ACTIVE | Noted: 2017-07-19

## 2017-07-19 PROBLEM — F10.939 ALCOHOL WITHDRAWAL SYNDROME WITH COMPLICATION (HCC): Status: ACTIVE | Noted: 2017-07-19

## 2017-07-19 PROBLEM — F19.20 POLYSUBSTANCE DEPENDENCE (HCC): Status: ACTIVE | Noted: 2017-07-19

## 2017-07-19 LAB
GLUCOSE BLD STRIP.AUTO-MCNC: 119 MG/DL (ref 65–100)
GLUCOSE BLD STRIP.AUTO-MCNC: 129 MG/DL (ref 65–100)
GLUCOSE BLD STRIP.AUTO-MCNC: 140 MG/DL (ref 65–100)
MAGNESIUM SERPL-MCNC: 1.3 MG/DL (ref 1.6–2.4)
SERVICE CMNT-IMP: ABNORMAL

## 2017-07-19 PROCEDURE — 96375 TX/PRO/DX INJ NEW DRUG ADDON: CPT

## 2017-07-19 PROCEDURE — 74011250637 HC RX REV CODE- 250/637: Performed by: EMERGENCY MEDICINE

## 2017-07-19 PROCEDURE — 82962 GLUCOSE BLOOD TEST: CPT

## 2017-07-19 PROCEDURE — 74011250636 HC RX REV CODE- 250/636: Performed by: HOSPITALIST

## 2017-07-19 PROCEDURE — 96372 THER/PROPH/DIAG INJ SC/IM: CPT

## 2017-07-19 PROCEDURE — 99218 HC RM OBSERVATION: CPT

## 2017-07-19 PROCEDURE — 74011250636 HC RX REV CODE- 250/636: Performed by: EMERGENCY MEDICINE

## 2017-07-19 PROCEDURE — 96361 HYDRATE IV INFUSION ADD-ON: CPT

## 2017-07-19 PROCEDURE — 77030029684 HC NEB SM VOL KT MONA -A

## 2017-07-19 PROCEDURE — 94640 AIRWAY INHALATION TREATMENT: CPT

## 2017-07-19 PROCEDURE — 74011636637 HC RX REV CODE- 636/637: Performed by: HOSPITALIST

## 2017-07-19 PROCEDURE — 74011000250 HC RX REV CODE- 250: Performed by: EMERGENCY MEDICINE

## 2017-07-19 PROCEDURE — 96376 TX/PRO/DX INJ SAME DRUG ADON: CPT

## 2017-07-19 PROCEDURE — 74011250637 HC RX REV CODE- 250/637: Performed by: PSYCHIATRY & NEUROLOGY

## 2017-07-19 PROCEDURE — 74011250637 HC RX REV CODE- 250/637: Performed by: HOSPITALIST

## 2017-07-19 RX ORDER — KETOROLAC TROMETHAMINE 30 MG/ML
15 INJECTION, SOLUTION INTRAMUSCULAR; INTRAVENOUS
Status: DISCONTINUED | OUTPATIENT
Start: 2017-07-19 | End: 2017-07-20 | Stop reason: HOSPADM

## 2017-07-19 RX ORDER — DEXTROSE 50 % IN WATER (D50W) INTRAVENOUS SYRINGE
12.5-25 AS NEEDED
Status: DISCONTINUED | OUTPATIENT
Start: 2017-07-19 | End: 2017-07-20 | Stop reason: HOSPADM

## 2017-07-19 RX ORDER — IPRATROPIUM BROMIDE AND ALBUTEROL SULFATE 2.5; .5 MG/3ML; MG/3ML
3 SOLUTION RESPIRATORY (INHALATION)
Status: ON HOLD | COMMUNITY
End: 2017-07-20

## 2017-07-19 RX ORDER — VERAPAMIL HYDROCHLORIDE 120 MG/1
120 CAPSULE, EXTENDED RELEASE ORAL DAILY
Status: ON HOLD | COMMUNITY
End: 2017-07-20

## 2017-07-19 RX ORDER — VERAPAMIL HYDROCHLORIDE 80 MG/1
40 TABLET ORAL 3 TIMES DAILY
Status: DISCONTINUED | OUTPATIENT
Start: 2017-07-19 | End: 2017-07-19

## 2017-07-19 RX ORDER — INSULIN LISPRO 100 [IU]/ML
INJECTION, SOLUTION INTRAVENOUS; SUBCUTANEOUS
Status: DISCONTINUED | OUTPATIENT
Start: 2017-07-19 | End: 2017-07-20 | Stop reason: HOSPADM

## 2017-07-19 RX ORDER — GLIMEPIRIDE 4 MG/1
4 TABLET ORAL 2 TIMES DAILY
Status: ON HOLD | COMMUNITY
End: 2017-07-20

## 2017-07-19 RX ORDER — VERAPAMIL HYDROCHLORIDE 120 MG/1
120 TABLET, FILM COATED, EXTENDED RELEASE ORAL DAILY
Status: DISCONTINUED | OUTPATIENT
Start: 2017-07-19 | End: 2017-07-20 | Stop reason: HOSPADM

## 2017-07-19 RX ORDER — ENOXAPARIN SODIUM 100 MG/ML
40 INJECTION SUBCUTANEOUS EVERY 12 HOURS
Status: DISCONTINUED | OUTPATIENT
Start: 2017-07-19 | End: 2017-07-20 | Stop reason: HOSPADM

## 2017-07-19 RX ORDER — PROCHLORPERAZINE MALEATE 10 MG
10 TABLET ORAL
Status: DISCONTINUED | OUTPATIENT
Start: 2017-07-19 | End: 2017-07-20 | Stop reason: HOSPADM

## 2017-07-19 RX ORDER — FOLIC ACID 1 MG/1
1 TABLET ORAL DAILY
Status: DISCONTINUED | OUTPATIENT
Start: 2017-07-19 | End: 2017-07-20 | Stop reason: HOSPADM

## 2017-07-19 RX ORDER — LANOLIN ALCOHOL/MO/W.PET/CERES
100 CREAM (GRAM) TOPICAL DAILY
Status: DISCONTINUED | OUTPATIENT
Start: 2017-07-19 | End: 2017-07-20 | Stop reason: HOSPADM

## 2017-07-19 RX ORDER — DIAZEPAM 5 MG/1
20 TABLET ORAL
Status: DISCONTINUED | OUTPATIENT
Start: 2017-07-19 | End: 2017-07-20 | Stop reason: HOSPADM

## 2017-07-19 RX ORDER — MAGNESIUM SULFATE 100 %
4 CRYSTALS MISCELLANEOUS AS NEEDED
Status: DISCONTINUED | OUTPATIENT
Start: 2017-07-19 | End: 2017-07-20 | Stop reason: HOSPADM

## 2017-07-19 RX ORDER — METFORMIN HYDROCHLORIDE 1000 MG/1
1000 TABLET ORAL 2 TIMES DAILY WITH MEALS
Status: ON HOLD | COMMUNITY
End: 2017-07-20

## 2017-07-19 RX ORDER — GUAIFENESIN 100 MG/5ML
200 SOLUTION ORAL
Status: DISCONTINUED | OUTPATIENT
Start: 2017-07-19 | End: 2017-07-20 | Stop reason: HOSPADM

## 2017-07-19 RX ORDER — GABAPENTIN 300 MG/1
300 CAPSULE ORAL 3 TIMES DAILY
Status: DISCONTINUED | OUTPATIENT
Start: 2017-07-19 | End: 2017-07-20 | Stop reason: HOSPADM

## 2017-07-19 RX ORDER — DIAZEPAM 5 MG/1
5 TABLET ORAL
Status: COMPLETED | OUTPATIENT
Start: 2017-07-19 | End: 2017-07-19

## 2017-07-19 RX ORDER — PROCHLORPERAZINE EDISYLATE 5 MG/ML
10 INJECTION INTRAMUSCULAR; INTRAVENOUS
Status: DISCONTINUED | OUTPATIENT
Start: 2017-07-19 | End: 2017-07-20 | Stop reason: HOSPADM

## 2017-07-19 RX ORDER — DIAZEPAM 5 MG/1
10 TABLET ORAL
Status: DISCONTINUED | OUTPATIENT
Start: 2017-07-19 | End: 2017-07-20 | Stop reason: HOSPADM

## 2017-07-19 RX ORDER — FLUTICASONE PROPIONATE AND SALMETEROL 250; 50 UG/1; UG/1
1 POWDER RESPIRATORY (INHALATION) 2 TIMES DAILY
COMMUNITY

## 2017-07-19 RX ADMIN — VERAPAMIL HYDROCHLORIDE 120 MG: 120 TABLET, FILM COATED, EXTENDED RELEASE ORAL at 17:24

## 2017-07-19 RX ADMIN — Medication 10 ML: at 21:51

## 2017-07-19 RX ADMIN — FOLIC ACID 1 MG: 1 TABLET ORAL at 11:53

## 2017-07-19 RX ADMIN — TRAMADOL HYDROCHLORIDE 50 MG: 50 TABLET, FILM COATED ORAL at 04:50

## 2017-07-19 RX ADMIN — KETOROLAC TROMETHAMINE 15 MG: 30 INJECTION, SOLUTION INTRAMUSCULAR at 21:50

## 2017-07-19 RX ADMIN — LISINOPRIL 20 MG: 20 TABLET ORAL at 09:05

## 2017-07-19 RX ADMIN — HYDRALAZINE HYDROCHLORIDE 20 MG: 20 INJECTION, SOLUTION INTRAMUSCULAR; INTRAVENOUS at 17:24

## 2017-07-19 RX ADMIN — SODIUM CHLORIDE 150 ML/HR: 900 INJECTION, SOLUTION INTRAVENOUS at 12:53

## 2017-07-19 RX ADMIN — GUAIFENESIN 200 MG: 100 SOLUTION ORAL at 04:14

## 2017-07-19 RX ADMIN — LORATADINE 10 MG: 10 TABLET ORAL at 09:05

## 2017-07-19 RX ADMIN — HYDRALAZINE HYDROCHLORIDE 20 MG: 20 INJECTION, SOLUTION INTRAMUSCULAR; INTRAVENOUS at 06:51

## 2017-07-19 RX ADMIN — ENOXAPARIN SODIUM 40 MG: 100 INJECTION SUBCUTANEOUS at 22:01

## 2017-07-19 RX ADMIN — MULTIPLE VITAMINS W/ MINERALS TAB 1 TABLET: TAB at 11:53

## 2017-07-19 RX ADMIN — INSULIN LISPRO 2 UNITS: 100 INJECTION, SOLUTION INTRAVENOUS; SUBCUTANEOUS at 17:19

## 2017-07-19 RX ADMIN — FLUTICASONE PROPIONATE 2 SPRAY: 50 SPRAY, METERED NASAL at 09:25

## 2017-07-19 RX ADMIN — FLUTICASONE PROPIONATE AND SALMETEROL 1 PUFF: 50; 100 POWDER RESPIRATORY (INHALATION) at 09:25

## 2017-07-19 RX ADMIN — SODIUM CHLORIDE 150 ML/HR: 900 INJECTION, SOLUTION INTRAVENOUS at 04:52

## 2017-07-19 RX ADMIN — Medication 100 MG: at 11:53

## 2017-07-19 RX ADMIN — IPRATROPIUM BROMIDE AND ALBUTEROL SULFATE 3 ML: .5; 3 SOLUTION RESPIRATORY (INHALATION) at 07:36

## 2017-07-19 RX ADMIN — ENOXAPARIN SODIUM 40 MG: 100 INJECTION SUBCUTANEOUS at 11:53

## 2017-07-19 RX ADMIN — Medication 10 ML: at 14:11

## 2017-07-19 RX ADMIN — VERAPAMIL HYDROCHLORIDE 40 MG: 80 TABLET, FILM COATED ORAL at 09:05

## 2017-07-19 RX ADMIN — KETOROLAC TROMETHAMINE 15 MG: 30 INJECTION, SOLUTION INTRAMUSCULAR at 14:10

## 2017-07-19 RX ADMIN — DIAZEPAM 5 MG: 5 TABLET ORAL at 11:53

## 2017-07-19 RX ADMIN — DIAZEPAM 5 MG: 5 TABLET ORAL at 14:10

## 2017-07-19 RX ADMIN — Medication 10 ML: at 05:47

## 2017-07-19 RX ADMIN — Medication 10 ML: at 05:48

## 2017-07-19 RX ADMIN — DIAZEPAM 5 MG: 5 TABLET ORAL at 12:53

## 2017-07-19 RX ADMIN — Medication 10 ML: at 17:24

## 2017-07-19 RX ADMIN — GABAPENTIN 300 MG: 300 CAPSULE ORAL at 21:49

## 2017-07-19 RX ADMIN — GABAPENTIN 300 MG: 300 CAPSULE ORAL at 17:24

## 2017-07-19 RX ADMIN — BUSPIRONE HYDROCHLORIDE 15 MG: 10 TABLET ORAL at 09:05

## 2017-07-19 NOTE — PROGRESS NOTES
RRAT Score: 8  Initial Assessment: CM reviewed chart and met with patient for discharge planning. CM verified patients address and contact number as correct on the facesheet. Pt presented to ED with nausea and vomiting. Patient is currently homeless and has been residing with various friends and family. At the time of discharge CM will arrange a taxi for patient to be transported [facility]. CM encouraged patient to register for the shelter. CM also encouraged patient to call friends and family in hopes of being able to reside with a family member or friend. Patient reported that her children are in [place] for the summer. Patient is receptive to being given a list of rooming houses to follow up with. Patient is unemployed. She reported that she receives $1400 per month in income. Patient consented for CM to make appointment arrangements. Patients PCP is Dr. Brian Fisher. Patient has also been seen at [facility] but reported that she no longer needs to be seen there. Patient will not need assistance with obtaining medications. Medications refills will likely be needed on discharge. [facility] to obtain medications. Patient is interested in food pantry resources. CM also suggested that patient be linked with [facility] for SA and homeless services. CM reviewed OBS form and placed a signed copy in the chart. Emergency Contact: Janki Herndon [phone]     Pertinent Medical Hx: see H&P     Transition Plan: Home with outpatient services. Involve patient/caregiver in assessment, planning, education and implement of intervention. Yes. CM will continue to follow case for discharge planning. CM daily patient care huddles/interdisciplinary rounds. Rounded with IDT. CM will handoff to [address] [place] [address] or PCP practice. CM evaluated for [facility] or [address] coordination of resources. CM will further assess if needed.      Care Management Interventions  PCP Verified by CM: Yes  Palliative Care Consult (Criteria: CHF and RRAT>21): No  Mode of Transport at Discharge: Other (see comment)  Transition of Care Consult (CM Consult): Discharge Planning (Patient will need a taxi at discharge)  Discharge Durable Medical Equipment: No  Physical Therapy Consult: No  Occupational Therapy Consult: No  Current Support Network:  Other (Homeless)  Confirm Follow Up Transport: [facility], [facility]

## 2017-07-19 NOTE — PROGRESS NOTES
Pt admitted with n/v, asthma exacerbation. Much improved, ambulating now, tolerating po diet. UDS + cocaine.    Ht: 5'2\"  Wt: 289 lb  BMI: 52.86 kg/(m^2) c/w obesity grade III (super morbid)  Est energy needs: 1985 kcal, 72 g protein, 2200 mL fluid   Pt will tolerated diet and consume > 75% meals at follow up

## 2017-07-19 NOTE — H&P
GENERAL GENERIC H&P/CONSULT    Subjective: throwing up and back hurts    35year old here with vomiting multiple times today with watery diarrhea x 5. She evidently came to the ED with suicidal ideation as well but denies this now. [facility] stated that she could not be evaluated until the medical issue was controlled so the ED called to place her under observation to control the vomiting. She evidently also had some asthma symptoms and had used cocaine prior to arrival.  Her BP has improved greatly. She stated the nausea and vomiting had improved since 3pm and she just has some back pain now that she attributes to walking. Past Medical History:   Diagnosis Date    Asthma     Diabetes ([facility] [address]     Hypertension     Psychiatric disorder     depression      Past Surgical History:   Procedure Laterality Date    HX TUBAL LIGATION        Prior to Admission medications    Medication Sig Start Date End Date Taking? Authorizing Provider   GLIPIZIDE PO Take  by mouth. Yes Tasneem Middleton MD   albuterol (PROVENTIL VENTOLIN) 2.5 mg /3 mL (0.083 %) nebulizer solution 3 mL by Nebulization route every four (4) hours as needed for Wheezing. 1/3/17  Yes VELMA Tamayo   albuterol (PROVENTIL, VENTOLIN) 90 mcg/actuation inhaler Take 1-2 Puffs by inhalation every four (4) hours as needed for Wheezing or Shortness of Breath. 5/17/16  Yes Onel De La O MD   lisinopril (PRINIVIL, ZESTRIL) 20 mg tablet Take 20 mg by mouth daily. Yes Tasneem Middleton MD   loratadine (CLARITIN) 10 mg tablet Take 1 Tab by mouth daily. 4/4/17   VELMA Landis   fluticasone (FLONASE) 50 mcg/actuation nasal spray 2 Sprays by Both Nostrils route daily. 4/4/17   VELMA Landis   ipratropium (ATROVENT) 0.02 % nebulizer solution 2.5 mL by Nebulization route as needed for Wheezing. Dispense one box. 1/3/17   VELMA Tamayo   VERAPAMIL HCL (VERAPAMIL PO) Take  by mouth.     Tasneem Middleton MD   albuterol (PROVENTIL VENTOLIN) 2.5 mg /3 mL (0.083 %) nebulizer solution 3 mL by Nebulization route every four (4) hours as needed for Wheezing or Shortness of Breath. 5/17/16   Cortez Cisse MD   fluticasone-salmeterol (ADVAIR DISKUS) 100-50 mcg/dose diskus inhaler Take 1 Puff by inhalation every twelve (12) hours. 5/24/13   Lopez Hernandez DO   busPIRone (BUSPAR) 15 mg tablet Take 15 mg by mouth three (3) times daily. Tasneem Middleton MD   risperidone (RISPERDAL) 0.5 mg tablet Take 0.5 mg by mouth. Tasneem Middleton MD   trazodone (DESYREL) 50 mg tablet Take 50 mg by mouth nightly. Tasneem Middleton MD     Allergies   Allergen Reactions    Aspirin Rash     Patient denies allergy, sometimes itches      Social History   Substance Use Topics    Smoking status: Never Smoker    Smokeless tobacco: Never Used    Alcohol use Yes      Comment: 2 or 3 40's a day 7/18/17      History reviewed. No pertinent family history. Review of Systems   Constitutional: Negative. HENT: Negative. Eyes: Negative. Respiratory: Positive for shortness of breath and wheezing. Negative for cough and choking. Cardiovascular: Negative for chest pain and leg swelling. Gastrointestinal: Positive for diarrhea, nausea and vomiting. Negative for abdominal distention, abdominal pain, anal bleeding, blood in stool and constipation. Endocrine: Negative. Genitourinary: Negative for difficulty urinating, dysuria and frequency. Musculoskeletal: Positive for back pain and myalgias. Negative for joint swelling. Skin: Negative. Allergic/Immunologic: Negative. Neurological: Negative. Hematological: Negative. Psychiatric/Behavioral: Positive for suicidal ideas. Negative for hallucinations and self-injury. All other systems reviewed and are negative.       Objective:          Patient Vitals for the past 8 hrs:   BP Temp Pulse Resp SpO2 Height Weight   07/18/17 2228 (!) 189/92 98.2 °F (36.8 °C) 82 20 100 % - 131.1 kg (289 lb)   07/18/17 2144 181/84 - 88 20 100 % - -   07/18/17 1908 Rick Russo ) 146/104 - 75 22 99 % - -   07/18/17 1835 - 98.1 °F (36.7 °C) 74 24 99 % - -   07/18/17 1820 (!) 190/100 - - - 100 % - -   07/18/17 1729 - - 81 26 - - -   07/18/17 1728 (!) 210/106 - 88 - - - -   07/18/17 1724 (!) 211/103 - 81 - 96 % - -   07/18/17 1657 - - 77 22 97 % - -   07/18/17 1607 (!) 201/106 98.6 °F (37 °C) (!) 123 23 99 % 5' 2\" (1.575 m) 130.2 kg (287 lb)     Physical Exam   Nursing note and vitals reviewed. Constitutional: She is oriented to person, place, and time. She appears well-developed. No distress. Very obese   Eyes: Pupils are equal, round, and reactive to light. Neck: Normal range of motion. Cardiovascular: Normal rate and regular rhythm. Pulmonary/Chest: Effort normal and breath sounds normal. No respiratory distress. She has no wheezes. She has no rales. Abdominal: Soft. Bowel sounds are normal. She exhibits no distension. There is no tenderness. Musculoskeletal: Normal range of motion. Trace edema   Neurological: She is alert and oriented to person, place, and time. She exhibits normal muscle tone. Skin: Skin is warm and dry. Psychiatric: Her speech is normal and behavior is normal. Her mood appears not anxious. Her affect is not inappropriate. Thought content is not delusional. Cognition and memory are normal. She expresses impulsivity and inappropriate judgment. She exhibits a depressed mood. She expresses no suicidal ideation.    Patient denies that she is currently suicidal but states she was on arrival to ED        Labs:    Recent Results (from the past 24 hour(s))   EKG, 12 LEAD, INITIAL    Collection Time: 07/18/17  4:28 PM   Result Value Ref Range    Ventricular Rate 82 BPM    Atrial Rate 82 BPM    P-R Interval 144 ms    QRS Duration 86 ms    Q-T Interval 394 ms    QTC Calculation (Bezet) 460 ms    Calculated P Axis 29 degrees    Calculated R Axis -14 degrees    Calculated T Axis 44 degrees    Diagnosis       Normal sinus rhythm  Normal ECG  When compared with ECG of 17-MAY-2016 17:34,  No significant change was found     CBC WITH AUTOMATED DIFF    Collection Time: 07/18/17  4:36 PM   Result Value Ref Range    WBC 5.5 3.6 - 11.0 K/uL    RBC 5.23 (H) 3.80 - 5.20 M/uL    HGB 9.1 (L) 11.5 - 16.0 g/dL    HCT 32.2 (L) 35.0 - 47.0 %    MCV 61.6 (L) 80.0 - 99.0 FL    MCH 17.4 (L) 26.0 - 34.0 PG    MCHC 28.3 (L) 30.0 - 36.5 g/dL    RDW 19.5 (H) 11.5 - 14.5 %    PLATELET 849 742 - 463 K/uL    NEUTROPHILS 73 32 - 75 %    LYMPHOCYTES 18 12 - 49 %    MONOCYTES 8 5 - 13 %    EOSINOPHILS 1 0 - 7 %    BASOPHILS 0 0 - 1 %    ABS. NEUTROPHILS 4.0 1.8 - 8.0 K/UL    ABS. LYMPHOCYTES 1.0 0.8 - 3.5 K/UL    ABS. MONOCYTES 0.4 0.0 - 1.0 K/UL    ABS. EOSINOPHILS 0.1 0.0 - 0.4 K/UL    ABS. BASOPHILS 0.0 0.0 - 0.1 K/UL    DF SMEAR SCANNED      RBC COMMENTS ANISOCYTOSIS  1+        RBC COMMENTS HYPOCHROMIA  4+       HCG QL SERUM    Collection Time: 07/18/17  4:36 PM   Result Value Ref Range    HCG, Ql. NEGATIVE  NEG     METABOLIC PANEL, COMPREHENSIVE    Collection Time: 07/18/17  4:36 PM   Result Value Ref Range    Sodium 141 136 - 145 mmol/L    Potassium 3.5 3.5 - 5.1 mmol/L    Chloride 103 97 - 108 mmol/L    CO2 28 21 - 32 mmol/L    Anion gap 10 5 - 15 mmol/L    Glucose 141 (H) 65 - 100 mg/dL    BUN 5 (L) 6 - 20 MG/DL    Creatinine 0.84 0.55 - 1.02 MG/DL    BUN/Creatinine ratio 6 (L) 12 - 20      GFR est AA >60 >60 ml/min/1.73m2    GFR est non-AA >60 >60 ml/min/1.73m2    Calcium 8.5 8.5 - 10.1 MG/DL    Bilirubin, total 1.0 0.2 - 1.0 MG/DL    ALT (SGPT) 86 (H) 12 - 78 U/L    AST (SGOT) 146 (H) 15 - 37 U/L    Alk.  phosphatase 79 45 - 117 U/L    Protein, total 7.9 6.4 - 8.2 g/dL    Albumin 3.5 3.5 - 5.0 g/dL    Globulin 4.4 (H) 2.0 - 4.0 g/dL    A-G Ratio 0.8 (L) 1.1 - 2.2     LIPASE    Collection Time: 07/18/17  4:36 PM   Result Value Ref Range    Lipase 87 73 - 393 U/L   TROPONIN I    Collection Time: 07/18/17  4:36 PM   Result Value Ref Range    Troponin-I, Qt. <0.04 <0.05 ng/mL   URINALYSIS W/ REFLEX CULTURE    Collection Time: 07/18/17  4:36 PM   Result Value Ref Range    Color YELLOW/STRAW      Appearance CLEAR CLEAR      Specific gravity <1.005 1.003 - 1.030    pH (UA) 7.0 5.0 - 8.0      Protein NEGATIVE  NEG mg/dL    Glucose NEGATIVE  NEG mg/dL    Ketone NEGATIVE  NEG mg/dL    Bilirubin NEGATIVE  NEG      Blood NEGATIVE  NEG      Urobilinogen 1.0 0.2 - 1.0 EU/dL    Nitrites NEGATIVE  NEG      Leukocyte Esterase NEGATIVE  NEG      WBC 0-4 0 - 4 /hpf    RBC 0-5 0 - 5 /hpf    Epithelial cells FEW FEW /lpf    Bacteria NEGATIVE  NEG /hpf    UA:UC IF INDICATED CULTURE NOT INDICATED BY UA RESULT CNI     DRUG SCREEN, URINE    Collection Time: 07/18/17  5:10 PM   Result Value Ref Range    AMPHETAMINES NEGATIVE  NEG      BARBITURATES NEGATIVE  NEG      BENZODIAZEPINE NEGATIVE  NEG      COCAINE POSITIVE (A) NEG      METHADONE NEGATIVE  NEG      OPIATES NEGATIVE  NEG      PCP(PHENCYCLIDINE) NEGATIVE  NEG      THC (TH-CANNABINOL) NEGATIVE  NEG      Drug screen comment (NOTE)    ETHYL ALCOHOL    Collection Time: 07/18/17  6:43 PM   Result Value Ref Range    ALCOHOL(ETHYL),SERUM <10 <10 MG/DL          Chest X-Ray: INDICATION: Tachypnea     Portable AP view of the chest.     Direct comparison made to prior chest x-ray dated April 4, 2017.     Cardiomediastinal silhouette is stable. Lungs are clear bilaterally. Pleural  spaces are normal. Osseous structures are intact.     IMPRESSION  IMPRESSION: No acute cardiopulmonary disease.     Assessment:  Principal Problem:    Nausea & vomiting (7/18/2017)    Active Problems:    Cocaine abuse (7/18/2017)      Asthma with acute exacerbation (7/18/2017)      Suicidal ideations (7/18/2017)        Plan: fluids and nausea meds, suicide precautions, needs BSMART eval tomorrow    Signed:  Lesia Peña MD 7/18/2017

## 2017-07-19 NOTE — CONSULTS
PSYCHIATRIC CONSULTATION                         IDENTIFICATION:    Patient Name  Jose Foster   Date of Birth 1975   [facility] [id number]   Medical Record Number  [id number]      Age  35 y.o. PCP None   Admit date:  7/18/2017    Room Number  226/01  @ [facility]   Date of Service  7/19/2017            HISTORY         REASON FOR HOSPITALIZATION/CONSULTATION:  A psychiatric consultation was requested by Fariha Shah MD to evaluate or provide advice/opinion related to evaluating depression and s/i  CC: \"i'm better today\"    HISTORY OF PRESENT ILLNESS:    The patient, Jose Foster, is a 35 y.o. BLACK OR  female with a past psychiatric history significant for severe polysubstance dependency, who was admitted to the medical floor for the treatment of Nausea & vomiting. Patient reports/evidences the following emotional symptoms:  depression and suicidal thoughts/threats. The above symptoms have been present for a day. These symptoms are of mild severity. The symptoms are fleeting in nature. Additional symptomatology include anxiety . The patient's condition has been precipitated by psychosocial stressors (homeless, jobless, social sequela of drug dep). Condition made worse by continued illicit drug use and alcohol use as well as treatment noncompliance. UDS: +millicent, BAL=0. ALLERGIES:  Allergies   Allergen Reactions    Aspirin Rash     Patient denies allergy, sometimes itches      MEDICATIONS PRIOR TO ADMISSION:  Prescriptions Prior to Admission   Medication Sig    GLIPIZIDE PO Take  by mouth.  lisinopril (PRINIVIL, ZESTRIL) 20 mg tablet Take 20 mg by mouth daily.  loratadine (CLARITIN) 10 mg tablet Take 1 Tab by mouth daily.  fluticasone (FLONASE) 50 mcg/actuation nasal spray 2 Sprays by Both Nostrils route daily.  albuterol (PROVENTIL VENTOLIN) 2.5 mg /3 mL (0.083 %) nebulizer solution 3 mL by Nebulization route every four (4) hours as needed for Wheezing.  ipratropium (ATROVENT) 0.02 % nebulizer solution 2.5 mL by Nebulization route as needed for Wheezing. Dispense one box.  VERAPAMIL HCL (VERAPAMIL PO) Take  by mouth.  albuterol (PROVENTIL VENTOLIN) 2.5 mg /3 mL (0.083 %) nebulizer solution 3 mL by Nebulization route every four (4) hours as needed for Wheezing or Shortness of Breath.  albuterol (PROVENTIL, VENTOLIN) 90 mcg/actuation inhaler Take 1-2 Puffs by inhalation every four (4) hours as needed for Wheezing or Shortness of Breath.  fluticasone-salmeterol (ADVAIR DISKUS) 100-50 mcg/dose diskus inhaler Take 1 Puff by inhalation every twelve (12) hours.  busPIRone (BUSPAR) 15 mg tablet Take 15 mg by mouth three (3) times daily.  risperidone (RISPERDAL) 0.5 mg tablet Take 0.5 mg by mouth.  trazodone (DESYREL) 50 mg tablet Take 50 mg by mouth nightly. PAST MEDICAL HISTORY:  Past Medical History:   Diagnosis Date    Asthma     Diabetes ([facility] [address]     Hypertension     Psychiatric disorder     depression     Past Surgical History:   Procedure Laterality Date    HX TUBAL LIGATION        SOCIAL HISTORY:   Social History     Social History    Marital status:      Spouse name: N/A    Number of children: N/A    Years of education: N/A     Occupational History    Not on file. Social History Main Topics    Smoking status: Never Smoker    Smokeless tobacco: Never Used    Alcohol use Yes      Comment: 2 or 3 40's a day 7/18/17    Drug use: Yes     Special: Cocaine      Comment: using millicent since age 11, no rehab in 15 yrs.  Sexual activity: Yes     Partners: Male     Birth control/ protection: Surgical     Other Topics Concern    Not on file     Social History Narrative    Single. Has 6 children, all living with her oldest son. No job, somehow got on SSDI for erroneous dx of schizophrenia. Severe polysub dep pt since age 11-- no rehab in 15 yrs. H/o arrests for prostitution. Arrested x 10. Dropped out in the 9th grade. Homeless. FAMILY HISTORY:   Family History   Problem Relation Age of Onset    Alcohol abuse Mother     Alcohol abuse Father        REVIEW of SYSTEMS:   Psychological ROS: positive for - irritability  negative for - suicidal ideation  Pertinent items are noted in the History of Present Illness. All other Systems reviewed and are considered negative. MENTAL STATUS EXAM & VITALS       MENTAL STATUS EXAM (MSE):    MSE FINDINGS ARE WITHIN NORMAL LIMITS (WNL) UNLESS OTHERWISE STATED BELOW. Orientation oriented to time, place and person   Vital Signs (BP,Pulse, Temp) See below (reviewed 7/19/2017); vital signs are WNL if not listed below.    Gait and Station Stable, no ataxia   Abnormal Muscular Movements/Tone/Behavior No EPS, no Tardive Dyskinesia, no abnormal muscular movements; wnl tone   Relations cooperative and unreliable   General Appearance:  age appropriate and overweight   Language No aphasia or dysarthria   Speech:  monotone   Thought Processes logical, wnl rate of thoughts, good abstract reasoning and computation   Thought Associations goal directed and logical   Thought Content free of delusions and free of hallucinations   Suicidal Ideations no plan , no intention and none   Homicidal Ideations no plan , no intention and none   Mood:  anxious and sad   Affect:  sad   Memory recent  adequate   Memory remote:  adequate   Concentration/Attention:  adequate   Fund of Knowledge average   Insight:  fair   Reliability poor   Judgment:  limited            VITALS:     Patient Vitals for the past 24 hrs:   Temp Pulse Resp BP SpO2   07/19/17 0651 - 87 - (!) 200/95 -   07/19/17 0419 - 87 22 187/84 98 %   07/18/17 2228 98.2 °F (36.8 °C) 82 20 (!) 189/92 100 %   07/18/17 2144 - 88 20 181/84 100 %   07/18/17 1908 - 75 22 (!) 146/104 99 %   07/18/17 1835 98.1 °F (36.7 °C) 74 24 - 99 %   07/18/17 1820 - - - (!) 190/100 100 %   07/18/17 1729 - 81 26 - -   07/18/17 1728 - 88 - (!) 210/106 -   07/18/17 1724 - 81 - (!) 211/103 96 %   07/18/17 1657 - 77 22 - 97 %   07/18/17 1607 98.6 °F (37 °C) (!) 123 23 (!) 201/106 99 %              DATA     LABORATORY DATA:  Labs Reviewed   CBC WITH AUTOMATED DIFF - Abnormal; Notable for the following:        Result Value    RBC 5.23 (*)     HGB 9.1 (*)     HCT 32.2 (*)     MCV 61.6 (*)     MCH 17.4 (*)     MCHC 28.3 (*)     RDW 19.5 (*)     All other components within normal limits   METABOLIC PANEL, COMPREHENSIVE - Abnormal; Notable for the following:     Glucose 141 (*)     BUN 5 (*)     BUN/Creatinine ratio 6 (*)     ALT (SGPT) 86 (*)     AST (SGOT) 146 (*)     Globulin 4.4 (*)     A-G Ratio 0.8 (*)     All other components within normal limits   DRUG SCREEN, URINE - Abnormal; Notable for the following:     COCAINE POSITIVE (*)     All other components within normal limits   CULTURE, STOOL   HCG QL SERUM   LIPASE   TROPONIN I   URINALYSIS W/ REFLEX CULTURE   ETHYL ALCOHOL   WBC, STOOL     Admission on 07/18/2017   Component Date Value Ref Range Status    Ventricular Rate 07/18/2017 82  BPM Preliminary    Atrial Rate 07/18/2017 82  BPM Preliminary    P-R Interval 07/18/2017 144  ms Preliminary    QRS Duration 07/18/2017 86  ms Preliminary    Q-T Interval 07/18/2017 394  ms Preliminary    QTC Calculation (Bezet) 07/18/2017 460  ms Preliminary    Calculated P Axis 07/18/2017 29  degrees Preliminary    Calculated R Axis 07/18/2017 -14  degrees Preliminary    Calculated T Axis 07/18/2017 44  degrees Preliminary    Diagnosis 07/18/2017    Preliminary                    Value:Normal sinus rhythm  Normal ECG  When compared with ECG of 17-MAY-2016 17:34,  No significant change was found      WBC 07/18/2017 5.5  3.6 - 11.0 K/uL Final    RBC 07/18/2017 5.23* 3.80 - 5.20 M/uL Final    HGB 07/18/2017 9.1* 11.5 - 16.0 g/dL Final    HCT 07/18/2017 32.2* 35.0 - 47.0 % Final    MCV 07/18/2017 61.6* 80.0 - 99.0 FL Final    MCH 07/18/2017 17.4* 26.0 - 34.0 PG Final    MCHC 07/18/2017 28.3* 30.0 - 36.5 g/dL Final    RDW 07/18/2017 19.5* 11.5 - 14.5 % Final    PLATELET 30/29/7246 854  150 - 400 K/uL Final    NEUTROPHILS 07/18/2017 73  32 - 75 % Final    LYMPHOCYTES 07/18/2017 18  12 - 49 % Final    MONOCYTES 07/18/2017 8  5 - 13 % Final    EOSINOPHILS 07/18/2017 1  0 - 7 % Final    BASOPHILS 07/18/2017 0  0 - 1 % Final    ABS. NEUTROPHILS 07/18/2017 4.0  1.8 - 8.0 K/UL Final    ABS. LYMPHOCYTES 07/18/2017 1.0  0.8 - 3.5 K/UL Final    ABS. MONOCYTES 07/18/2017 0.4  0.0 - 1.0 K/UL Final    ABS. EOSINOPHILS 07/18/2017 0.1  0.0 - 0.4 K/UL Final    ABS. BASOPHILS 07/18/2017 0.0  0.0 - 0.1 K/UL Final    DF 07/18/2017 SMEAR SCANNED    Final    RBC COMMENTS 07/18/2017     Final                    Value:ANISOCYTOSIS  1+      RBC COMMENTS 07/18/2017     Final                    Value:HYPOCHROMIA  4+      HCG, Ql. 07/18/2017 NEGATIVE   NEG   Final    Sodium 07/18/2017 141  136 - 145 mmol/L Final    Potassium 07/18/2017 3.5  3.5 - 5.1 mmol/L Final    Chloride 07/18/2017 103  97 - 108 mmol/L Final    CO2 07/18/2017 28  21 - 32 mmol/L Final    Anion gap 07/18/2017 10  5 - 15 mmol/L Final    Glucose 07/18/2017 141* 65 - 100 mg/dL Final    BUN 07/18/2017 5* 6 - 20 MG/DL Final    Creatinine 07/18/2017 0.84  0.55 - 1.02 MG/DL Final    BUN/Creatinine ratio 07/18/2017 6* 12 - 20   Final    GFR est AA 07/18/2017 >60  >60 ml/min/1.73m2 Final    GFR est non-AA 07/18/2017 >60  >60 ml/min/1.73m2 Final    Calcium 07/18/2017 8.5  8.5 - 10.1 MG/DL Final    Bilirubin, total 07/18/2017 1.0  0.2 - 1.0 MG/DL Final    ALT (SGPT) 07/18/2017 86* 12 - 78 U/L Final    AST (SGOT) 07/18/2017 146* 15 - 37 U/L Final    Alk.  phosphatase 07/18/2017 79  45 - 117 U/L Final    Protein, total 07/18/2017 7.9  6.4 - 8.2 g/dL Final    Albumin 07/18/2017 3.5  3.5 - 5.0 g/dL Final    Globulin 07/18/2017 4.4* 2.0 - 4.0 g/dL Final    A-G Ratio 07/18/2017 0.8* 1.1 - 2.2   Final    Lipase 07/18/2017 87  73 - 393 U/L Final  Troponin-I, Qt. 07/18/2017 <0.04  <0.05 ng/mL Final    Color 07/18/2017 YELLOW/STRAW    Final    Appearance 07/18/2017 CLEAR  CLEAR   Final    Specific gravity 07/18/2017 <1.005  1.003 - 1.030 Final    pH (UA) 07/18/2017 7.0  5.0 - 8.0   Final    Protein 07/18/2017 NEGATIVE   NEG mg/dL Final    Glucose 07/18/2017 NEGATIVE   NEG mg/dL Final    Ketone 07/18/2017 NEGATIVE   NEG mg/dL Final    Bilirubin 07/18/2017 NEGATIVE   NEG   Final    Blood 07/18/2017 NEGATIVE   NEG   Final    Urobilinogen 07/18/2017 1.0  0.2 - 1.0 EU/dL Final    Nitrites 07/18/2017 NEGATIVE   NEG   Final    Leukocyte Esterase 07/18/2017 NEGATIVE   NEG   Final    WBC 07/18/2017 0-4  0 - 4 /hpf Final    RBC 07/18/2017 0-5  0 - 5 /hpf Final    Epithelial cells 07/18/2017 FEW  FEW /lpf Final    Bacteria 07/18/2017 NEGATIVE   NEG /hpf Final    UA:UC IF INDICATED 07/18/2017 CULTURE NOT INDICATED BY UA RESULT  CNI   Final    AMPHETAMINES 07/18/2017 NEGATIVE   NEG   Final    BARBITURATES 07/18/2017 NEGATIVE   NEG   Final    BENZODIAZEPINE 07/18/2017 NEGATIVE   NEG   Final    COCAINE 07/18/2017 POSITIVE* NEG   Final    METHADONE 07/18/2017 NEGATIVE   NEG   Final    OPIATES 07/18/2017 NEGATIVE   NEG   Final    PCP(PHENCYCLIDINE) 07/18/2017 NEGATIVE   NEG   Final    THC (TH-CANNABINOL) 07/18/2017 NEGATIVE   NEG   Final    Drug screen comment 07/18/2017 (NOTE)   Final    ALCOHOL(ETHYL),SERUM 07/18/2017 <10  <10 MG/DL Final        RADIOLOGY REPORTS:    Results from Hospital Encounter encounter on 07/18/17   XR CHEST PORT   Narrative INDICATION: Tachypnea    Portable AP view of the chest.    Direct comparison made to prior chest x-ray dated April 4, 2017. Cardiomediastinal silhouette is stable. Lungs are clear bilaterally. Pleural  spaces are normal. Osseous structures are intact. Impression IMPRESSION: No acute cardiopulmonary disease.       Xr Chest Port    Result Date: 7/18/2017  INDICATION: Tachypnea Portable AP view of the chest. Direct comparison made to prior chest x-ray dated April 4, 2017. Cardiomediastinal silhouette is stable. Lungs are clear bilaterally. Pleural spaces are normal. Osseous structures are intact. IMPRESSION: No acute cardiopulmonary disease.               MEDICATIONS       ALL MEDICATIONS  Current Facility-Administered Medications   Medication Dose Route Frequency    guaiFENesin (ROBITUSSIN) 100 mg/5 mL oral liquid 200 mg  200 mg Oral Q4H PRN    verapamil (CALAN) tablet 40 mg  40 mg Oral TID    enoxaparin (LOVENOX) injection 40 mg  40 mg SubCUTAneous Q12H    sodium chloride (NS) flush 5-10 mL  5-10 mL IntraVENous Q8H    sodium chloride (NS) flush 5-10 mL  5-10 mL IntraVENous PRN    sodium chloride (NS) flush 5-10 mL  5-10 mL IntraVENous Q8H    sodium chloride (NS) flush 5-10 mL  5-10 mL IntraVENous PRN    acetaminophen (TYLENOL) tablet 650 mg  650 mg Oral Q4H PRN    ondansetron (ZOFRAN) injection 4 mg  4 mg IntraVENous Q4H PRN    0.9% sodium chloride infusion  150 mL/hr IntraVENous CONTINUOUS    LORazepam (ATIVAN) injection 1 mg  1 mg IntraVENous Q4H PRN    fluticasone-salmeterol (ADVAIR) 100mcg-50mcg/puff  1 Puff Inhalation Q12H    traZODone (DESYREL) tablet 50 mg  50 mg Oral QHS    albuterol-ipratropium (DUO-NEB) 2.5 MG-0.5 MG/3 ML  3 mL Nebulization Q4H PRN    traMADol (ULTRAM) tablet 50 mg  50 mg Oral Q6H PRN    hydrALAZINE (APRESOLINE) 20 mg/mL injection 20 mg  20 mg IntraVENous Q6H PRN    fluticasone (FLONASE) 50 mcg/actuation nasal spray 2 Spray  2 Spray Both Nostrils DAILY    loratadine (CLARITIN) tablet 10 mg  10 mg Oral DAILY    lisinopril (PRINIVIL, ZESTRIL) tablet 20 mg  20 mg Oral DAILY    busPIRone (BUSPAR) tablet 15 mg  15 mg Oral TID      SCHEDULED MEDICATIONS  Current Facility-Administered Medications   Medication Dose Route Frequency    verapamil (CALAN) tablet 40 mg  40 mg Oral TID    enoxaparin (LOVENOX) injection 40 mg  40 mg SubCUTAneous Q12H    sodium chloride (NS) flush 5-10 mL  5-10 mL IntraVENous Q8H    sodium chloride (NS) flush 5-10 mL  5-10 mL IntraVENous Q8H    0.9% sodium chloride infusion  150 mL/hr IntraVENous CONTINUOUS    fluticasone-salmeterol (ADVAIR) 100mcg-50mcg/puff  1 Puff Inhalation Q12H    traZODone (DESYREL) tablet 50 mg  50 mg Oral QHS    fluticasone (FLONASE) 50 mcg/actuation nasal spray 2 Spray  2 Spray Both Nostrils DAILY    loratadine (CLARITIN) tablet 10 mg  10 mg Oral DAILY    lisinopril (PRINIVIL, ZESTRIL) tablet 20 mg  20 mg Oral DAILY    busPIRone (BUSPAR) tablet 15 mg  15 mg Oral TID                ASSESSMENT & PLAN        The patient Dorothy Guerrero is a 35 y.o.  female who presents at this time for treatment of the following diagnoses:  Patient Active Hospital Problem List:      Suicidal ideations (7/18/2017)    Assessment: none today, likely malingered to get into the hospital. Homeless. Plan: limits, sobriety. No need for s/i precautions, very low risk of s/i   Polysubstance dependence ([facility] (7/19/2017)    Assessment: severe, long term    Plan: residential rehab, though motivation for sobriety not as great as motivation for housing   Substance induced mood disorder ([facility] [address] (7/19/2017)    Assessment: acute and chronic, no s/i    Plan: no need for an AD, needs to be sober for at least 6 weeks before considering psych meds   Cocaine intoxication ([facility] [address] (7/19/2017)    Assessment: s/p. No s/i today    Plan: prns   Alcohol withdrawal syndrome with complication ([facility] [address] (3/79/6993)    Assessment: heavy etoh consumptions    Plan: Patient is at high risk of alcohol withdrawal complications and needs to be aggressively detoxed via CIWA protocol with valium loading and neurontin for augmentation purposes and to prophylax against withdrawal seizures. Thank you very much for the opportunity to participate in the care of your patient. I will continue to follow up with patient as deemed appropriate.        A coordinated, multidisplinary treatment team round was conducted with the patient; that includes the nurse, unit pharmcist,  and writer all present. The following regarding medications was addressed during rounds with patient:   the risks and benefits of the proposed medication. The patient was given the opportunity to ask questions. Informed consent given to the use of the above medications. I will continue to adjust psychiatric and non-psychiatric medications (see above \"medication\" section and orders section for details) as deemed appropriate & based upon diagnoses and response to treatment. I have reviewed admission (and previous/old) labs and medical tests in the EHR and or transferring hospital documents. I will continue to order blood tests/labs and diagnostic tests as deemed appropriate and review results as they become available (see orders for details). I have reviewed old psychiatric and medical records available in the EHR. I Will order additional psychiatric records from other institutions to further elucidate the nature of patient's psychopathology and review once available. I will gather additional collateral information from friends, family and o/p treatment team to further elucidate the nature of patient's psychopathology and baselline level of psychiatric functioning.                      SIGNED:    Jerome Amor MD  7/19/2017

## 2017-07-19 NOTE — BSMART NOTE
BSMART NOTE:    This writer arrived at [facility] ED and was informed that pt was being admitted for hypertension, vomiting, nausea, and potential heat exhaustion; therefore, BSMART assessment was unneccessary as pt was not medically cleared. Pt's UDS was positive (+) for cocaine. Per ED staff, pt did not exhibit any aggressive or maladaptive behaviors as seen on previous ED visit on 07.15.17.      VICTOR M Estrada, [employer]

## 2017-07-19 NOTE — PROGRESS NOTES
Bedside shift change report given to Sadnra Cobos RN (oncoming nurse) by Reinier Godinez (offgoing nurse). Report included the following information SBAR.

## 2017-07-19 NOTE — PROGRESS NOTES
Pharmacy Medication Reconciliation     Recommendations/Findings:   1) patient said she has not taken any medications about 3 weeks  2) clarified advair dose as 250/50 not 100/50   3) discontinued glipizide and listed glimepiride 4 mg bid and metformin 1000 mg bid  4) removed buspar 15 mg, risperidone 0.5 mg, and trazodone 50 mg  5) clarified verapamil as 120 mg er daily     -Clarified PTA med list with patient and . PTA medication list was corrected to the following:     Prior to Admission Medications   Prescriptions Last Dose Informant Patient Reported? Taking? albuterol (PROVENTIL, VENTOLIN) 90 mcg/actuation inhaler Not Taking at Unknown time  No No   Sig: Take 1-2 Puffs by inhalation every four (4) hours as needed for Wheezing or Shortness of Breath. albuterol-ipratropium (DUO-NEB) 2.5 mg-0.5 mg/3 ml nebu 6/27/2017  Yes Yes   Sig: 3 mL by Nebulization route every four (4) hours as needed. fluticasone-salmeterol (ADVAIR DISKUS) 250-50 mcg/dose diskus inhaler 6/27/2017  Yes Yes   Sig: Take 1 Puff by inhalation two (2) times a day. glimepiride (AMARYL) 4 mg tablet 6/27/2017  Yes Yes   Sig: Take 4 mg by mouth two (2) times a day. lisinopril (PRINIVIL, ZESTRIL) 20 mg tablet 6/18/2017 at Unknown time  Yes Yes   Sig: Take 20 mg by mouth daily. loratadine (CLARITIN) 10 mg tablet 6/27/2017 at Unknown time  No No   Sig: Take 1 Tab by mouth daily. metFORMIN (GLUCOPHAGE) 1,000 mg tablet 6/27/2017  Yes Yes   Sig: Take 1,000 mg by mouth two (2) times daily (with meals). verapamil ER (VERELAN) 120 mg ER capsule 6/27/2017  Yes Yes   Sig: Take 120 mg by mouth daily.       Facility-Administered Medications: None          Thank you,  Jordon Bautista, [facility]

## 2017-07-19 NOTE — ED NOTES
TRANSFER - OUT REPORT:    Verbal report given to ARNOLDO South(name) on Jose Foster  being transferred to [address] [place] [address](unit) for routine progression of care       Report consisted of patients Situation, Background, Assessment and   Recommendations(SBAR). Information from the following report(s) SBAR, Kardex, ED Summary, MAR and Med Rec Status was reviewed with the receiving nurse. Lines:   Peripheral IV 07/18/17 Right Antecubital (Active)   Site Assessment Clean, dry, & intact 7/18/2017  4:46 PM   Phlebitis Assessment 0 7/18/2017  4:46 PM   Infiltration Assessment 0 7/18/2017  4:46 PM   Dressing Status Clean, dry, & intact 7/18/2017  4:46 PM   Dressing Type Transparent 7/18/2017  4:46 PM   Hub Color/Line Status Green;Flushed;Patent 7/18/2017  4:46 PM   Action Taken Blood drawn 7/18/2017  4:46 PM       Peripheral IV 07/18/17 Left Antecubital (Active)   Site Assessment Clean, dry, & intact 7/18/2017  6:44 PM   Phlebitis Assessment 0 7/18/2017  6:44 PM   Infiltration Assessment 0 7/18/2017  6:44 PM   Dressing Status Clean, dry, & intact 7/18/2017  6:44 PM   Dressing Type Transparent 7/18/2017  6:44 PM   Hub Color/Line Status Green;Flushed;Patent; Positional 7/18/2017  6:44 PM   Action Taken Blood drawn 7/18/2017  6:44 PM        Opportunity for questions and clarification was provided.       Patient transported with:   [facility]

## 2017-07-19 NOTE — PROGRESS NOTES
Pt tolerating water , no complaints of nausea or vomiting noted . 2 episodes of diarrhea noted . Discussed advancing diet to clear liquid this am . Pt agreed to POC. Denies any suicide ideations. 0650 BP recheck 201/97 pt given 20 mg hydralazine per order    0700 Bedside shift change report given to [address] [place] [address] & [facility] (oncoming nurse) by me (offgoing nurse). Report given with SBAR, MAR and Recent Results.

## 2017-07-19 NOTE — H&P
Hospitalist Admission Note    NAME: Christie Xie   :  1975   MRN:  [id number]     Date/Time:  2017 7:16 AM    Patient PCP: None  ________________________________________________________________________    My assessment of this patient's clinical condition and my plan of care is as follows. Assessment / Plan:    N/V with diarrhea  likley GE vs. Polysubstance induced   Still nauseous and having diarrhea, no more emesis  -cont IVF  -advance diet as tolerated  -will send stool studies  -start probiotics    HTN: POA  Non compliance  Cocaine on board  -resume ACEI and CCB, home medications    DM: POA  -hba1c  -ISS  -restart home meds, once buddy to tolerate po diet    SI: poa  On suicidal precaution  Psychiatry consult    Tobacco and cocaine abuse  Counseled to quit  Morbid obesity    Code Status: full  Surrogate Decision Maker: refused to discuss for now    DVT Prophylaxis: lovenox  GI Prophylaxis: not indicated    Baseline: homeless, independent        Subjective:   CHIEF COMPLAINT: vomiting, diarrhea and back pain with SI    HISTORY OF PRESENT ILLNESS:   Pt admitted by telehospitalist, seen and examined by me this morning. Christie Xie is a 35 y.o. female with h/o DM, Asthma, HTN, depression presented to ER with above symptoms and was evaluated in ER by [facility] for psychiatric admission but could not get medical clearance because of intractable vomiting. Pt c/o multiple episodes of vomiting and watery diarrhea started yesterday. She also had suicidal ideation on presentation but is denying any right now. Lab work up was essentially unremarkable. No vomiting  since admission But had 4 loose BM overnight. Denies fever, sick contacts or recent travels. Has been having spikes of elevated BP in setting of likely non compliance and UDS positive for cocaine. .    We were asked to admit for work up and evaluation of the above problems.      Past Medical History:   Diagnosis Date    Asthma  Diabetes ([facility] [address]     Hypertension     Psychiatric disorder     depression        Past Surgical History:   Procedure Laterality Date    HX TUBAL LIGATION         Social History   Substance Use Topics    Smoking status: Never Smoker    Smokeless tobacco: Never Used    Alcohol use Yes      Comment: 2 or 3 40's a day 7/18/17        Family history.+ HTN, DM  Allergies   Allergen Reactions    Aspirin Rash     Patient denies allergy, sometimes itches        Prior to Admission medications    Medication Sig Start Date End Date Taking? Authorizing Provider   GLIPIZIDE PO Take  by mouth. Yes Tasneem Middleton MD   lisinopril (PRINIVIL, ZESTRIL) 20 mg tablet Take 20 mg by mouth daily. Yes Tasneem Middleton MD   loratadine (CLARITIN) 10 mg tablet Take 1 Tab by mouth daily. 4/4/17   VELMA Abdi   fluticasone (FLONASE) 50 mcg/actuation nasal spray 2 Sprays by Both Nostrils route daily. 4/4/17   VELMA Abdi   albuterol (PROVENTIL VENTOLIN) 2.5 mg /3 mL (0.083 %) nebulizer solution 3 mL by Nebulization route every four (4) hours as needed for Wheezing. 1/3/17   VELMA Kline   ipratropium (ATROVENT) 0.02 % nebulizer solution 2.5 mL by Nebulization route as needed for Wheezing. Dispense one box. 1/3/17   VELMA Kline   VERAPAMIL HCL (VERAPAMIL PO) Take  by mouth. Tasneem Middleton MD   albuterol (PROVENTIL VENTOLIN) 2.5 mg /3 mL (0.083 %) nebulizer solution 3 mL by Nebulization route every four (4) hours as needed for Wheezing or Shortness of Breath. 5/17/16   Gus Marquez MD   albuterol (PROVENTIL, VENTOLIN) 90 mcg/actuation inhaler Take 1-2 Puffs by inhalation every four (4) hours as needed for Wheezing or Shortness of Breath. 5/17/16   Gus Marquez MD   fluticasone-salmeterol (ADVAIR DISKUS) 100-50 mcg/dose diskus inhaler Take 1 Puff by inhalation every twelve (12) hours. 5/24/13   Lenin Crandall DO   busPIRone (BUSPAR) 15 mg tablet Take 15 mg by mouth three (3) times daily.     Tasneem Middleton MD risperidone (RISPERDAL) 0.5 mg tablet Take 0.5 mg by mouth. Tasneem Middleton MD   trazodone (DESYREL) 50 mg tablet Take 50 mg by mouth nightly. Tasneem Middleton MD       REVIEW OF SYSTEMS:     I am not able to complete the review of systems because: The patient is intubated and sedated    The patient has altered mental status due to his acute medical problems    The patient has baseline aphasia from prior stroke(s)    The patient has baseline dementia and is not reliable historian    The patient is in acute medical distress and unable to provide information           Total of 12 systems reviewed as follows:       POSITIVE= underlined text  Negative = text not underlined  General:  fever, chills, sweats, generalized weakness, weight loss/gain,      loss of appetite   Eyes:    blurred vision, eye pain, loss of vision, double vision  ENT:    rhinorrhea, pharyngitis   Respiratory:   cough, sputum production, SOB, ZHANG, wheezing, pleuritic pain   Cardiology:   chest pain, palpitations, orthopnea, PND, edema, syncope   Gastrointestinal:  abdominal pain , N/V, diarrhea, dysphagia, constipation, bleeding   Genitourinary:  frequency, urgency, dysuria, hematuria, incontinence   Muskuloskeletal :  arthralgia, myalgia, back pain  Hematology:  easy bruising, nose or gum bleeding, lymphadenopathy   Dermatological: rash, ulceration, pruritis, color change / jaundice  Endocrine:   hot flashes or polydipsia   Neurological:  headache, dizziness, confusion, focal weakness, paresthesia,     Speech difficulties, memory loss, gait difficulty  Psychological: Feelings of anxiety, depression, agitation    Objective:   VITALS:    Visit Vitals    BP (!) 200/95    Pulse 87    Temp 98.2 °F (36.8 °C)    Resp 22    Ht 5' 2\" (1.575 m)    Wt 131.1 kg (289 lb)    SpO2 98%    Breastfeeding No    BMI 52.86 kg/m2       PHYSICAL EXAM:    General:    Alert, cooperative, no distress, appears stated age.      HEENT: Atraumatic, anicteric sclerae, pink conjunctivae     No oral ulcers, mucosa moist, throat clear, dentition fair  Neck:  Supple, symmetrical,  thyroid: non tender  Lungs:   Clear to auscultation bilaterally. No Wheezing or Rhonchi. No rales. Chest wall:  No tenderness  No Accessory muscle use. Heart:   Regular  rhythm,  No  murmur   No edema  Abdomen:   Soft, non-tender. Not distended. Bowel sounds normal  Extremities: No cyanosis. No clubbing,      Skin turgor normal, Capillary refill normal, Radial dial pulse 2+  Skin:     Not pale. Not Jaundiced  No rashes   Psych:  Good insight. Not depressed. Not anxious or agitated. Neurologic: EOMs intact. No facial asymmetry. No aphasia or slurred speech. Symmetrical strength, Sensation grossly intact. Alert and oriented X 4.     _______________________________________________________________________  Care Plan discussed with:    Comments   Patient X    Family      RN X    Care Manager                    Consultant:      _______________________________________________________________________  Expected  Disposition:   Home with Family X   HH/PT/OT/RN    SNF/LTC    REGIS    ________________________________________________________________________  TOTAL TIME:  72 Minutes    Critical Care Provided     Minutes non procedure based      Comments     Reviewed previous records   >50% of visit spent in counseling and coordination of care  Discussion with patient and/or family and questions answered       ________________________________________________________________________  Signed: Minnie Oppenheim, MD    Procedures: see electronic medical records for all procedures/Xrays and details which were not copied into this note but were reviewed prior to creation of Plan.     LAB DATA REVIEWED:    Recent Results (from the past 24 hour(s))   EKG, 12 LEAD, INITIAL    Collection Time: 07/18/17  4:28 PM   Result Value Ref Range    Ventricular Rate 82 BPM    Atrial Rate 82 BPM    P-R Interval 144 ms    QRS Duration 86 ms Q-T Interval 394 ms    QTC Calculation (Bezet) 460 ms    Calculated P Axis 29 degrees    Calculated R Axis -14 degrees    Calculated T Axis 44 degrees    Diagnosis       Normal sinus rhythm  Normal ECG  When compared with ECG of 17-MAY-2016 17:34,  No significant change was found     CBC WITH AUTOMATED DIFF    Collection Time: 07/18/17  4:36 PM   Result Value Ref Range    WBC 5.5 3.6 - 11.0 K/uL    RBC 5.23 (H) 3.80 - 5.20 M/uL    HGB 9.1 (L) 11.5 - 16.0 g/dL    HCT 32.2 (L) 35.0 - 47.0 %    MCV 61.6 (L) 80.0 - 99.0 FL    MCH 17.4 (L) 26.0 - 34.0 PG    MCHC 28.3 (L) 30.0 - 36.5 g/dL    RDW 19.5 (H) 11.5 - 14.5 %    PLATELET 116 838 - 628 K/uL    NEUTROPHILS 73 32 - 75 %    LYMPHOCYTES 18 12 - 49 %    MONOCYTES 8 5 - 13 %    EOSINOPHILS 1 0 - 7 %    BASOPHILS 0 0 - 1 %    ABS. NEUTROPHILS 4.0 1.8 - 8.0 K/UL    ABS. LYMPHOCYTES 1.0 0.8 - 3.5 K/UL    ABS. MONOCYTES 0.4 0.0 - 1.0 K/UL    ABS. EOSINOPHILS 0.1 0.0 - 0.4 K/UL    ABS. BASOPHILS 0.0 0.0 - 0.1 K/UL    DF SMEAR SCANNED      RBC COMMENTS ANISOCYTOSIS  1+        RBC COMMENTS HYPOCHROMIA  4+       HCG QL SERUM    Collection Time: 07/18/17  4:36 PM   Result Value Ref Range    HCG, Ql. NEGATIVE  NEG     METABOLIC PANEL, COMPREHENSIVE    Collection Time: 07/18/17  4:36 PM   Result Value Ref Range    Sodium 141 136 - 145 mmol/L    Potassium 3.5 3.5 - 5.1 mmol/L    Chloride 103 97 - 108 mmol/L    CO2 28 21 - 32 mmol/L    Anion gap 10 5 - 15 mmol/L    Glucose 141 (H) 65 - 100 mg/dL    BUN 5 (L) 6 - 20 MG/DL    Creatinine 0.84 0.55 - 1.02 MG/DL    BUN/Creatinine ratio 6 (L) 12 - 20      GFR est AA >60 >60 ml/min/1.73m2    GFR est non-AA >60 >60 ml/min/1.73m2    Calcium 8.5 8.5 - 10.1 MG/DL    Bilirubin, total 1.0 0.2 - 1.0 MG/DL    ALT (SGPT) 86 (H) 12 - 78 U/L    AST (SGOT) 146 (H) 15 - 37 U/L    Alk.  phosphatase 79 45 - 117 U/L    Protein, total 7.9 6.4 - 8.2 g/dL    Albumin 3.5 3.5 - 5.0 g/dL    Globulin 4.4 (H) 2.0 - 4.0 g/dL    A-G Ratio 0.8 (L) 1.1 - 2.2 LIPASE    Collection Time: 07/18/17  4:36 PM   Result Value Ref Range    Lipase 87 73 - 393 U/L   TROPONIN I    Collection Time: 07/18/17  4:36 PM   Result Value Ref Range    Troponin-I, Qt. <0.04 <0.05 ng/mL   URINALYSIS W/ REFLEX CULTURE    Collection Time: 07/18/17  4:36 PM   Result Value Ref Range    Color YELLOW/STRAW      Appearance CLEAR CLEAR      Specific gravity <1.005 1.003 - 1.030    pH (UA) 7.0 5.0 - 8.0      Protein NEGATIVE  NEG mg/dL    Glucose NEGATIVE  NEG mg/dL    Ketone NEGATIVE  NEG mg/dL    Bilirubin NEGATIVE  NEG      Blood NEGATIVE  NEG      Urobilinogen 1.0 0.2 - 1.0 EU/dL    Nitrites NEGATIVE  NEG      Leukocyte Esterase NEGATIVE  NEG      WBC 0-4 0 - 4 /hpf    RBC 0-5 0 - 5 /hpf    Epithelial cells FEW FEW /lpf    Bacteria NEGATIVE  NEG /hpf    UA:UC IF INDICATED CULTURE NOT INDICATED BY UA RESULT CNI     DRUG SCREEN, URINE    Collection Time: 07/18/17  5:10 PM   Result Value Ref Range    AMPHETAMINES NEGATIVE  NEG      BARBITURATES NEGATIVE  NEG      BENZODIAZEPINE NEGATIVE  NEG      COCAINE POSITIVE (A) NEG      METHADONE NEGATIVE  NEG      OPIATES NEGATIVE  NEG      PCP(PHENCYCLIDINE) NEGATIVE  NEG      THC (TH-CANNABINOL) NEGATIVE  NEG      Drug screen comment (NOTE)    ETHYL ALCOHOL    Collection Time: 07/18/17  6:43 PM   Result Value Ref Range    ALCOHOL(ETHYL),SERUM <10 <10 MG/DL

## 2017-07-20 VITALS
OXYGEN SATURATION: 100 % | SYSTOLIC BLOOD PRESSURE: 158 MMHG | TEMPERATURE: 96.8 F | DIASTOLIC BLOOD PRESSURE: 99 MMHG | RESPIRATION RATE: 18 BRPM | HEIGHT: 62 IN | HEART RATE: 91 BPM | WEIGHT: 292.6 LBS | BODY MASS INDEX: 53.84 KG/M2

## 2017-07-20 LAB
ANION GAP BLD CALC-SCNC: 11 MMOL/L (ref 5–15)
BASOPHILS # BLD AUTO: 0 K/UL (ref 0–0.1)
BASOPHILS # BLD: 0 % (ref 0–1)
BUN SERPL-MCNC: 5 MG/DL (ref 6–20)
BUN/CREAT SERPL: 6 (ref 12–20)
CALCIUM SERPL-MCNC: 8.6 MG/DL (ref 8.5–10.1)
CHLORIDE SERPL-SCNC: 102 MMOL/L (ref 97–108)
CO2 SERPL-SCNC: 25 MMOL/L (ref 21–32)
CREAT SERPL-MCNC: 0.88 MG/DL (ref 0.55–1.02)
DIFFERENTIAL METHOD BLD: ABNORMAL
EOSINOPHIL # BLD: 0.1 K/UL (ref 0–0.4)
EOSINOPHIL NFR BLD: 2 % (ref 0–7)
ERYTHROCYTE [DISTWIDTH] IN BLOOD BY AUTOMATED COUNT: 20.2 % (ref 11.5–14.5)
EST. AVERAGE GLUCOSE BLD GHB EST-MCNC: 166 MG/DL
GLUCOSE BLD STRIP.AUTO-MCNC: 117 MG/DL (ref 65–100)
GLUCOSE BLD STRIP.AUTO-MCNC: 118 MG/DL (ref 65–100)
GLUCOSE SERPL-MCNC: 140 MG/DL (ref 65–100)
HBA1C MFR BLD: 7.4 % (ref 4.2–6.3)
HCT VFR BLD AUTO: 32.8 % (ref 35–47)
HGB BLD-MCNC: 9.3 G/DL (ref 11.5–16)
LYMPHOCYTES # BLD AUTO: 30 % (ref 12–49)
LYMPHOCYTES # BLD: 1.6 K/UL (ref 0.8–3.5)
MCH RBC QN AUTO: 17.5 PG (ref 26–34)
MCHC RBC AUTO-ENTMCNC: 28.4 G/DL (ref 30–36.5)
MCV RBC AUTO: 61.9 FL (ref 80–99)
MONOCYTES # BLD: 0.4 K/UL (ref 0–1)
MONOCYTES NFR BLD AUTO: 7 % (ref 5–13)
NEUTS SEG # BLD: 3.2 K/UL (ref 1.8–8)
NEUTS SEG NFR BLD AUTO: 61 % (ref 32–75)
PLATELET # BLD AUTO: 258 K/UL (ref 150–400)
PLATELET COMMENTS,PCOM: ABNORMAL
POTASSIUM SERPL-SCNC: 3.4 MMOL/L (ref 3.5–5.1)
RBC # BLD AUTO: 5.3 M/UL (ref 3.8–5.2)
RBC MORPH BLD: ABNORMAL
SERVICE CMNT-IMP: ABNORMAL
SERVICE CMNT-IMP: ABNORMAL
SODIUM SERPL-SCNC: 138 MMOL/L (ref 136–145)
WBC # BLD AUTO: 5.3 K/UL (ref 3.6–11)

## 2017-07-20 PROCEDURE — 82962 GLUCOSE BLOOD TEST: CPT

## 2017-07-20 PROCEDURE — 80048 BASIC METABOLIC PNL TOTAL CA: CPT | Performed by: HOSPITALIST

## 2017-07-20 PROCEDURE — 99218 HC RM OBSERVATION: CPT

## 2017-07-20 PROCEDURE — 74011000250 HC RX REV CODE- 250: Performed by: EMERGENCY MEDICINE

## 2017-07-20 PROCEDURE — 36415 COLL VENOUS BLD VENIPUNCTURE: CPT | Performed by: HOSPITALIST

## 2017-07-20 PROCEDURE — 74011250637 HC RX REV CODE- 250/637: Performed by: PSYCHIATRY & NEUROLOGY

## 2017-07-20 PROCEDURE — 85025 COMPLETE CBC W/AUTO DIFF WBC: CPT | Performed by: HOSPITALIST

## 2017-07-20 PROCEDURE — 83036 HEMOGLOBIN GLYCOSYLATED A1C: CPT | Performed by: HOSPITALIST

## 2017-07-20 PROCEDURE — 74011250637 HC RX REV CODE- 250/637: Performed by: HOSPITALIST

## 2017-07-20 PROCEDURE — 74011250637 HC RX REV CODE- 250/637: Performed by: EMERGENCY MEDICINE

## 2017-07-20 PROCEDURE — 94640 AIRWAY INHALATION TREATMENT: CPT

## 2017-07-20 RX ORDER — METFORMIN HYDROCHLORIDE 1000 MG/1
1000 TABLET ORAL 2 TIMES DAILY WITH MEALS
Qty: 60 TAB | Refills: 0 | Status: SHIPPED | OUTPATIENT
Start: 2017-07-20 | End: 2018-05-17

## 2017-07-20 RX ORDER — IPRATROPIUM BROMIDE AND ALBUTEROL SULFATE 2.5; .5 MG/3ML; MG/3ML
3 SOLUTION RESPIRATORY (INHALATION)
Qty: 30 NEBULE | Refills: 0 | Status: SHIPPED | OUTPATIENT
Start: 2017-07-20 | End: 2018-05-17

## 2017-07-20 RX ORDER — LISINOPRIL 20 MG/1
20 TABLET ORAL DAILY
Qty: 30 TAB | Refills: 0 | Status: SHIPPED | OUTPATIENT
Start: 2017-07-20 | End: 2018-05-17

## 2017-07-20 RX ORDER — VERAPAMIL HYDROCHLORIDE 120 MG/1
120 CAPSULE, EXTENDED RELEASE ORAL DAILY
Qty: 30 CAP | Refills: 0 | Status: SHIPPED | OUTPATIENT
Start: 2017-07-20 | End: 2018-05-17

## 2017-07-20 RX ORDER — LANOLIN ALCOHOL/MO/W.PET/CERES
100 CREAM (GRAM) TOPICAL DAILY
Qty: 30 TAB | Refills: 0 | Status: SHIPPED | OUTPATIENT
Start: 2017-07-20 | End: 2018-05-17

## 2017-07-20 RX ORDER — GLIMEPIRIDE 4 MG/1
4 TABLET ORAL
Qty: 30 TAB | Refills: 0 | Status: SHIPPED | OUTPATIENT
Start: 2017-07-20 | End: 2018-05-17

## 2017-07-20 RX ORDER — GABAPENTIN 300 MG/1
300 CAPSULE ORAL 3 TIMES DAILY
Qty: 9 CAP | Refills: 0 | Status: SHIPPED | OUTPATIENT
Start: 2017-07-20 | End: 2017-07-23

## 2017-07-20 RX ORDER — FOLIC ACID 1 MG/1
1 TABLET ORAL DAILY
Qty: 30 TAB | Refills: 0 | Status: SHIPPED | OUTPATIENT
Start: 2017-07-20 | End: 2018-05-17

## 2017-07-20 RX ORDER — POTASSIUM CHLORIDE 750 MG/1
40 TABLET, FILM COATED, EXTENDED RELEASE ORAL
Status: COMPLETED | OUTPATIENT
Start: 2017-07-20 | End: 2017-07-20

## 2017-07-20 RX ADMIN — Medication 100 MG: at 08:54

## 2017-07-20 RX ADMIN — GABAPENTIN 300 MG: 300 CAPSULE ORAL at 08:54

## 2017-07-20 RX ADMIN — VERAPAMIL HYDROCHLORIDE 120 MG: 120 TABLET, FILM COATED, EXTENDED RELEASE ORAL at 08:54

## 2017-07-20 RX ADMIN — Medication 10 ML: at 06:24

## 2017-07-20 RX ADMIN — FOLIC ACID 1 MG: 1 TABLET ORAL at 08:54

## 2017-07-20 RX ADMIN — POTASSIUM CHLORIDE 40 MEQ: 750 TABLET, FILM COATED, EXTENDED RELEASE ORAL at 10:36

## 2017-07-20 RX ADMIN — IPRATROPIUM BROMIDE AND ALBUTEROL SULFATE 3 ML: .5; 3 SOLUTION RESPIRATORY (INHALATION) at 06:57

## 2017-07-20 RX ADMIN — MULTIPLE VITAMINS W/ MINERALS TAB 1 TABLET: TAB at 08:54

## 2017-07-20 RX ADMIN — LISINOPRIL 20 MG: 20 TABLET ORAL at 08:54

## 2017-07-20 RX ADMIN — LORATADINE 10 MG: 10 TABLET ORAL at 08:54

## 2017-07-20 NOTE — INTERDISCIPLINARY ROUNDS
CM rounded with IDT and discussed patient's care. Following rounds CM met with patient at bedside. Patient reported that she was homeless and did not have anywhere to go. CM reminded patient of their previous discussion about her registering with [facility] and contacting rooming house landlords. CM reviewed the rooming house list, food pantry, and feeding sites with patient. CM provided patient with a bus ticket to go to [facility]. Patient agreed that she would register and reported that her cousin is going to allow her to reside with him for the night. CM encouraged patient to work to [phone] before her children return from their summer visit with family.     Vikash, [address]

## 2017-07-20 NOTE — DISCHARGE SUMMARY
Hospitalist Discharge Summary     Patient ID:  Sree Lobato  [id number]  50 y.o.  1975    PCP on record: None    Admit date: 7/18/2017  Discharge date and time: 7/20/2017      DISCHARGE DIAGNOSIS:    N/V with diarrhea  likley GE vs. Polysubstance induced   Mild hypokalemia  HTN  Non compliance  Cocaine on board  DM  SI  Tobacco and cocaine abuse  Non compliance with medications  Morbid obesity  Body mass index is 53.52 kg/(m^2). CONSULTATIONS:  IP CONSULT TO HOSPITALIST  IP CONSULT TO PSYCHIATRY    Excerpted HPI from H&P of Bethany Lowe MD:    Pt admitted by telehospitalist, seen and examined by me this morning. Sree Lobato is a 35 y.o. female with h/o DM, Asthma, HTN, depression presented to ER with above symptoms and was evaluated in ER by [facility] for psychiatric admission but could not get medical clearance because of intractable vomiting. Pt c/o multiple episodes of vomiting and watery diarrhea started yesterday. Pooja Deluca also had suicidal ideation on presentation but is denying any right now. Lab work up was essentially unremarkable. No vomiting  since admission But had 4 loose BM overnight. Denies fever, sick contacts or recent travels. Has been having spikes of elevated BP in setting of likely non compliance and UDS positive for cocaine. .     We were asked to admit for work up and evaluation of the above problems. ______________________________________________________________________  DISCHARGE SUMMARY/HOSPITAL COURSE:  for full details see H&P, daily progress notes, labs, consult notes. N/V with diarrhea  likley GE vs. Polysubstance induced   Tolerating po diet  No more GI symptoms, no N/V, no diarrhea reported in 24 hours.     -could not send stool studies because no BM reported     Mild hypokalemia: POA  Repleted     HTN: POA  Non compliance  Cocaine on board  -resume ACEI and CCB, home medications      DM: POA  -[id number]  -resume home meds.      SI  Not SI any more  Appreciate psychiatry consult  Off suicide precautions     Tobacco and cocaine abuse  Non compliance with medications  Counseled to quit  Will provide residential rehab resources     Morbid obesity  Body mass index is 53.52 kg/(m^2). _______________________________________________________________________  Patient seen and examined by me on discharge day. Pertinent Findings:  Gen:    Not in distress  Chest: Clear lungs  CVS:   Regular rhythm. No edema  Abd:  Soft, not distended, not tender  Neuro:  Alert, cn 2-12 grossly intact  _______________________________________________________________________  DISCHARGE MEDICATIONS:   Current Discharge Medication List      START taking these medications    Details   gabapentin (NEURONTIN) 300 mg capsule Take 1 Cap by mouth three (3) times daily for 3 days. Qty: 9 Cap, Refills: 0      thiamine (B-1) 100 mg tablet Take 1 Tab by mouth daily. Qty: 30 Tab, Refills: 0      folic acid (FOLVITE) 1 mg tablet Take 1 Tab by mouth daily. Qty: 30 Tab, Refills: 0         CONTINUE these medications which have CHANGED    Details   metFORMIN (GLUCOPHAGE) 1,000 mg tablet Take 1 Tab by mouth two (2) times daily (with meals). Qty: 60 Tab, Refills: 0      verapamil ER (VERELAN) 120 mg ER capsule Take 1 Cap by mouth daily. Qty: 30 Cap, Refills: 0      lisinopril (PRINIVIL, ZESTRIL) 20 mg tablet Take 1 Tab by mouth daily. Qty: 30 Tab, Refills: 0      glimepiride (AMARYL) 4 mg tablet Take 1 Tab by mouth every morning. Qty: 30 Tab, Refills: 0      albuterol-ipratropium (DUO-NEB) 2.5 mg-0.5 mg/3 ml nebu 3 mL by Nebulization route every four (4) hours as needed. Qty: 30 Nebule, Refills: 0      albuterol (PROVENTIL, VENTOLIN) 90 mcg/actuation inhaler Take 1-2 Puffs by inhalation every four (4) hours as needed for Wheezing or Shortness of Breath.   Qty: 17 g, Refills: 0         CONTINUE these medications which have NOT CHANGED    Details   fluticasone-salmeterol (ADVAIR DISKUS) 250-50 mcg/dose diskus inhaler Take 1 Puff by inhalation two (2) times a day. loratadine (CLARITIN) 10 mg tablet Take 1 Tab by mouth daily. Qty: 20 Tab, Refills: 0             My Recommended Diet, Activity, Wound Care, and follow-up labs are listed in the patient's Discharge Insturctions which I have personally completed and reviewed.     _______________________________________________________________________  DISPOSITION:    Home with Family: x   Home with HH/PT/OT/RN:    MAGGIE/LTC:    REGIS:    OTHER:        Condition at Discharge:  Stable  _______________________________________________________________________  Follow up with:   PCP : None  Follow-up Information     Follow up With Details Comments MD [address]    [address] [id number] [phone] [address] [id number] [facility]  [phone]    None   None (395) Patient stated that they have no PCP                Total time in minutes spent coordinating this discharge (includes going over instructions, follow-up, prescriptions, and preparing report for sign off to her PCP) :  38 minutes    Signed:  Brady Chino MD

## 2017-07-20 NOTE — PROGRESS NOTES
Hospitalist Progress Note    NAME: Jose Hoff   :  1975   MRN:  [id number]       Assessment / Plan:    N/V with diarrhea  likley GE vs. Polysubstance induced   Tolerating po diet and is good eater  No GI symptoms noted so far  -could not send stool studies because no BM reported    Mild hypokalemia: POA  Replete    HTN: POA  Non compliance  Cocaine on board  -resume ACEI and CCB, home medications     DM: POA  -[id number]  -ISS  -restart home meds, once buddy to tolerate po diet     SI: poa  Not SI any more  Appreciate psychiatry consult  Off suicide precautions    Tobacco and cocaine abuse  Non compliance with medications  Counseled to quit  Will provide residential rehab resources    Morbid obesity  Body mass index is 53.52 kg/(m^2). Code Status: full  Surrogate Decision Maker: refused to discuss for now     DVT Prophylaxis: lovenox  GI Prophylaxis: not indicated     Baseline: homeless, independent         Subjective:     Chief Complaint / Reason for Physician Visit  \"no N/V, tolerating po diet, claims 4 loose BM yesterday but as per nurse no BM in last 24 hours\". Discussed with RN events overnight. Review of Systems:  Symptom Y/N Comments  Symptom Y/N Comments   Fever/Chills n   Chest Pain n    Poor Appetite n   Edema n    Cough n   Abdominal Pain n    Sputum n   Joint Pain n    SOB/ZHANG n   Pruritis/Rash     Nausea/vomit n   Tolerating PT/OT y    Diarrhea    Tolerating Diet y    Constipation    Other       Could NOT obtain due to:      Objective:     VITALS:   Last 24hrs VS reviewed since prior progress note.  Most recent are:  Patient Vitals for the past 24 hrs:   Temp Pulse Resp BP SpO2   17 0755 96.8 °F (36 °C) 91 18 (!) 158/99 100 %   17 0331 98.3 °F (36.8 °C) 86 19 136/87 100 %   17 97.6 °F (36.4 °C) 83 20 142/60 97 %   17 1608 98.5 °F (36.9 °C) 77 22 (!) 174/101 95 %     No intake or output data in the 24 hours ending 17 0917     PHYSICAL EXAM:  General: WD, WN. Alert, cooperative, no acute distress    EENT:  EOMI. Anicteric sclerae. MMM  Resp:  CTA bilaterally, no wheezing or rales. No accessory muscle use  CV:  Regular  rhythm,  No edema  GI:  Soft, Non distended, Non tender.  +Bowel sounds  Neurologic:  Alert and oriented X 3, normal speech,   Psych:   Good insight. Not anxious nor agitated  Skin:  No rashes. No jaundice    Reviewed most current lab test results and cultures  YES  Reviewed most current radiology test results   YES  Review and summation of old records today    NO  Reviewed patient's current orders and MAR    YES  PMH/SH reviewed - no change compared to H&P  ________________________________________________________________________  Care Plan discussed with:    Comments   Patient x    Family      RN x    Care Manager     Consultant                        Multidiciplinary team rounds were held today with , nursing, pharmacist and clinical coordinator. Patient's plan of care was discussed; medications were reviewed and discharge planning was addressed. ________________________________________________________________________  Total NON critical care TIME: 40   Minutes    Total CRITICAL CARE TIME Spent:   Minutes non procedure based      Comments   >50% of visit spent in counseling and coordination of care     ________________________________________________________________________  Arminda Hutchison MD     Procedures: see electronic medical records for all procedures/Xrays and details which were not copied into this note but were reviewed prior to creation of Plan. LABS:  I reviewed today's most current labs and imaging studies.   Pertinent labs include:  Recent Labs      07/20/17   0157 07/18/17   1636   WBC  5.3  5.5   HGB  9.3*  9.1*   HCT  32.8*  32.2*   PLT  258  250     Recent Labs      07/20/17 0157 07/18/17   1636   NA  138  141   K  3.4*  3.5   CL  102  103   CO2  25  28   GLU  140*  141*   BUN  5*  5* CREA  0.88  0.84   CA  8.6  8.5   MG   --   1.3*   ALB   --   3.5   TBILI   --   1.0   SGOT   --   146*   ALT   --   86*       Signed: Ilene Fong MD

## 2017-07-20 NOTE — PROGRESS NOTES
pt appears more talkative and open today , making requests known , talking on the phone , tolerating regular diet . 0730 Bedside shift change report given to [address] (oncoming nurse) by me (offgoing nurse). Report given with SBAR, MAR and Recent Results.

## 2017-07-20 NOTE — PROGRESS NOTES
Spiritual Care Partner Volunteer visited patient in Surg/Tele unit on 7/20/17.   Documented by:  Charu Camera [id number] [facility] Manj[address] ([id number])

## 2017-07-20 NOTE — PROGRESS NOTES
Pt discharged to home, stated she is going to stay with her cousin. Pt's instructions reviewed and copies given along with prescriptions and IV removed, pt taken home by cousin.

## 2017-07-21 ENCOUNTER — TELEPHONE (OUTPATIENT)
Dept: CASE MANAGEMENT | Age: 42
End: 2017-07-21

## 2017-07-21 NOTE — TELEPHONE ENCOUNTER
Care Management Follow-up Call   Reviewed chart. Call patient. Verified for correct patient. Patient did receive her Discharge Instructions. Reviewed form. She is on several new medications. No questions about her medications and she has had them filled. Discussed medical follow-up offer again to assist with making an appointment. Patient refused and stressed she will make her own appointment. She is displaced at this time and explained that she has transportation benefits for medical appointments. No other questions or concerns at this time.      [facility] MSESTEFANY RN   [phone]

## 2017-07-21 NOTE — PROGRESS NOTES
Care Management Interventions  PCP Verified by CM: Yes  Palliative Care Consult (Criteria: CHF and RRAT>21): No  Mode of Transport at Discharge: Other (see comment)  Transition of Care Consult (CM Consult): Discharge Planning (Patient will need a taxi at discharge)  Discharge Durable Medical Equipment: No  Physical Therapy Consult: No  Occupational Therapy Consult: No  Current Support Network: Other (Homeless)  Confirm Follow Up Transport: Self    Patient will follow up with Dr. Jaspal Wilson for primary care. Patient reported a willingness to follow-up with [facility] due to her being homeless.     Vikash [address]

## 2017-07-24 LAB
ATRIAL RATE: 82 BPM
CALCULATED P AXIS, ECG09: 29 DEGREES
CALCULATED R AXIS, ECG10: -14 DEGREES
CALCULATED T AXIS, ECG11: 44 DEGREES
DIAGNOSIS, 93000: NORMAL
P-R INTERVAL, ECG05: 144 MS
Q-T INTERVAL, ECG07: 394 MS
QRS DURATION, ECG06: 86 MS
QTC CALCULATION (BEZET), ECG08: 460 MS
VENTRICULAR RATE, ECG03: 82 BPM

## 2017-08-06 ENCOUNTER — APPOINTMENT (OUTPATIENT)
Dept: GENERAL RADIOLOGY | Age: 42
End: 2017-08-06
Attending: EMERGENCY MEDICINE
Payer: MEDICAID

## 2017-08-06 ENCOUNTER — HOSPITAL ENCOUNTER (EMERGENCY)
Age: 42
Discharge: HOME | End: 2017-08-06
Attending: EMERGENCY MEDICINE
Payer: MEDICAID

## 2017-08-06 VITALS
HEIGHT: 63 IN | DIASTOLIC BLOOD PRESSURE: 91 MMHG | TEMPERATURE: 98.4 F | RESPIRATION RATE: 23 BRPM | WEIGHT: 279.4 LBS | HEART RATE: 100 BPM | OXYGEN SATURATION: 98 % | BODY MASS INDEX: 49.5 KG/M2 | SYSTOLIC BLOOD PRESSURE: 144 MMHG

## 2017-08-06 DIAGNOSIS — G43.A0 CYCLICAL VOMITING WITH NAUSEA, INTRACTABILITY OF VOMITING NOT SPECIFIED: Primary | ICD-10-CM

## 2017-08-06 LAB
ALBUMIN SERPL BCP-MCNC: 4 G/DL (ref 3.5–5)
ALBUMIN/GLOB SERPL: 0.7 {RATIO} (ref 1.1–2.2)
ALP SERPL-CCNC: 92 U/L (ref 45–117)
ALT SERPL-CCNC: 72 U/L (ref 12–78)
AMPHET UR QL SCN: NEGATIVE
ANION GAP BLD CALC-SCNC: 10 MMOL/L (ref 5–15)
APPEARANCE UR: ABNORMAL
AST SERPL W P-5'-P-CCNC: 99 U/L (ref 15–37)
ATRIAL RATE: 84 BPM
BACTERIA URNS QL MICRO: NEGATIVE /HPF
BARBITURATES UR QL SCN: NEGATIVE
BASOPHILS # BLD AUTO: 0 K/UL (ref 0–0.1)
BASOPHILS # BLD: 0 % (ref 0–1)
BENZODIAZ UR QL: NEGATIVE
BILIRUB SERPL-MCNC: 1.1 MG/DL (ref 0.2–1)
BILIRUB UR QL: NEGATIVE
BUN SERPL-MCNC: 4 MG/DL (ref 6–20)
BUN/CREAT SERPL: 5 (ref 12–20)
CALCIUM SERPL-MCNC: 9.7 MG/DL (ref 8.5–10.1)
CALCULATED P AXIS, ECG09: -3 DEGREES
CALCULATED R AXIS, ECG10: -23 DEGREES
CALCULATED T AXIS, ECG11: 36 DEGREES
CANNABINOIDS UR QL SCN: NEGATIVE
CHLORIDE SERPL-SCNC: 100 MMOL/L (ref 97–108)
CO2 SERPL-SCNC: 26 MMOL/L (ref 21–32)
COCAINE UR QL SCN: POSITIVE
COLOR UR: ABNORMAL
CREAT SERPL-MCNC: 0.88 MG/DL (ref 0.55–1.02)
DIAGNOSIS, 93000: NORMAL
DIFFERENTIAL METHOD BLD: ABNORMAL
DRUG SCRN COMMENT,DRGCM: ABNORMAL
EOSINOPHIL # BLD: 0.1 K/UL (ref 0–0.4)
EOSINOPHIL NFR BLD: 2 % (ref 0–7)
EPITH CASTS URNS QL MICRO: ABNORMAL /LPF
ERYTHROCYTE [DISTWIDTH] IN BLOOD BY AUTOMATED COUNT: 19.3 % (ref 11.5–14.5)
ETHANOL SERPL-MCNC: 23 MG/DL
GLOBULIN SER CALC-MCNC: 5.6 G/DL (ref 2–4)
GLUCOSE SERPL-MCNC: 133 MG/DL (ref 65–100)
GLUCOSE UR STRIP.AUTO-MCNC: NEGATIVE MG/DL
HCT VFR BLD AUTO: 36.4 % (ref 35–47)
HGB BLD-MCNC: 10.6 G/DL (ref 11.5–16)
HGB UR QL STRIP: NEGATIVE
KETONES UR QL STRIP.AUTO: NEGATIVE MG/DL
LEUKOCYTE ESTERASE UR QL STRIP.AUTO: NEGATIVE
LIPASE SERPL-CCNC: 107 U/L (ref 73–393)
LYMPHOCYTES # BLD AUTO: 37 % (ref 12–49)
LYMPHOCYTES # BLD: 1.7 K/UL (ref 0.8–3.5)
MCH RBC QN AUTO: 18.2 PG (ref 26–34)
MCHC RBC AUTO-ENTMCNC: 29.1 G/DL (ref 30–36.5)
MCV RBC AUTO: 62.4 FL (ref 80–99)
METHADONE UR QL: NEGATIVE
MONOCYTES # BLD: 0.4 K/UL (ref 0–1)
MONOCYTES NFR BLD AUTO: 9 % (ref 5–13)
NEUTS SEG # BLD: 2.4 K/UL (ref 1.8–8)
NEUTS SEG NFR BLD AUTO: 52 % (ref 32–75)
NITRITE UR QL STRIP.AUTO: NEGATIVE
OPIATES UR QL: NEGATIVE
OTHER,OTHU: ABNORMAL
P-R INTERVAL, ECG05: 138 MS
PCP UR QL: NEGATIVE
PH UR STRIP: 7.5 [PH] (ref 5–8)
PLATELET # BLD AUTO: 271 K/UL (ref 150–400)
PLATELET COMMENTS,PCOM: ABNORMAL
POTASSIUM SERPL-SCNC: 3.5 MMOL/L (ref 3.5–5.1)
PROT SERPL-MCNC: 9.6 G/DL (ref 6.4–8.2)
PROT UR STRIP-MCNC: 30 MG/DL
Q-T INTERVAL, ECG07: 392 MS
QRS DURATION, ECG06: 80 MS
QTC CALCULATION (BEZET), ECG08: 463 MS
RBC # BLD AUTO: 5.83 M/UL (ref 3.8–5.2)
RBC #/AREA URNS HPF: ABNORMAL /HPF (ref 0–5)
RBC MORPH BLD: ABNORMAL
SODIUM SERPL-SCNC: 136 MMOL/L (ref 136–145)
SP GR UR REFRACTOMETRY: 1.01 (ref 1–1.03)
UROBILINOGEN UR QL STRIP.AUTO: 1 EU/DL (ref 0.2–1)
VENTRICULAR RATE, ECG03: 84 BPM
WBC # BLD AUTO: 4.6 K/UL (ref 3.6–11)
WBC URNS QL MICRO: ABNORMAL /HPF (ref 0–4)

## 2017-08-06 PROCEDURE — 93005 ELECTROCARDIOGRAM TRACING: CPT

## 2017-08-06 PROCEDURE — 74011000250 HC RX REV CODE- 250: Performed by: EMERGENCY MEDICINE

## 2017-08-06 PROCEDURE — 80307 DRUG TEST PRSMV CHEM ANLYZR: CPT | Performed by: EMERGENCY MEDICINE

## 2017-08-06 PROCEDURE — 81001 URINALYSIS AUTO W/SCOPE: CPT | Performed by: EMERGENCY MEDICINE

## 2017-08-06 PROCEDURE — 74020 XR ABD FLAT/ ERECT: CPT

## 2017-08-06 PROCEDURE — 83690 ASSAY OF LIPASE: CPT | Performed by: EMERGENCY MEDICINE

## 2017-08-06 PROCEDURE — 94640 AIRWAY INHALATION TREATMENT: CPT

## 2017-08-06 PROCEDURE — 85025 COMPLETE CBC W/AUTO DIFF WBC: CPT | Performed by: EMERGENCY MEDICINE

## 2017-08-06 PROCEDURE — 74011250636 HC RX REV CODE- 250/636: Performed by: EMERGENCY MEDICINE

## 2017-08-06 PROCEDURE — 36415 COLL VENOUS BLD VENIPUNCTURE: CPT | Performed by: EMERGENCY MEDICINE

## 2017-08-06 PROCEDURE — 96375 TX/PRO/DX INJ NEW DRUG ADDON: CPT

## 2017-08-06 PROCEDURE — 96374 THER/PROPH/DIAG INJ IV PUSH: CPT

## 2017-08-06 PROCEDURE — 80053 COMPREHEN METABOLIC PANEL: CPT | Performed by: EMERGENCY MEDICINE

## 2017-08-06 PROCEDURE — 74011250637 HC RX REV CODE- 250/637: Performed by: EMERGENCY MEDICINE

## 2017-08-06 PROCEDURE — 71020 XR CHEST PA LAT: CPT

## 2017-08-06 PROCEDURE — 99285 EMERGENCY DEPT VISIT HI MDM: CPT

## 2017-08-06 PROCEDURE — 96361 HYDRATE IV INFUSION ADD-ON: CPT

## 2017-08-06 RX ORDER — HYDRALAZINE HYDROCHLORIDE 20 MG/ML
20 INJECTION INTRAMUSCULAR; INTRAVENOUS
Status: COMPLETED | OUTPATIENT
Start: 2017-08-06 | End: 2017-08-06

## 2017-08-06 RX ORDER — LISINOPRIL 10 MG/1
20 TABLET ORAL
Status: COMPLETED | OUTPATIENT
Start: 2017-08-06 | End: 2017-08-06

## 2017-08-06 RX ORDER — LORAZEPAM 2 MG/ML
1 INJECTION INTRAMUSCULAR
Status: COMPLETED | OUTPATIENT
Start: 2017-08-06 | End: 2017-08-06

## 2017-08-06 RX ORDER — VERAPAMIL HYDROCHLORIDE 120 MG/1
120 TABLET, FILM COATED, EXTENDED RELEASE ORAL
Status: COMPLETED | OUTPATIENT
Start: 2017-08-06 | End: 2017-08-06

## 2017-08-06 RX ORDER — PROMETHAZINE HYDROCHLORIDE 25 MG/1
25 TABLET ORAL
Qty: 12 TAB | Refills: 0 | Status: SHIPPED | OUTPATIENT
Start: 2017-08-06 | End: 2017-09-22

## 2017-08-06 RX ORDER — VERAPAMIL HYDROCHLORIDE 120 MG/1
120 TABLET, FILM COATED, EXTENDED RELEASE ORAL
Status: DISCONTINUED | OUTPATIENT
Start: 2017-08-06 | End: 2017-08-06

## 2017-08-06 RX ORDER — ONDANSETRON 2 MG/ML
8 INJECTION INTRAMUSCULAR; INTRAVENOUS
Status: COMPLETED | OUTPATIENT
Start: 2017-08-06 | End: 2017-08-06

## 2017-08-06 RX ADMIN — METHYLPREDNISOLONE SODIUM SUCCINATE 125 MG: 125 INJECTION, POWDER, FOR SOLUTION INTRAMUSCULAR; INTRAVENOUS at 05:24

## 2017-08-06 RX ADMIN — HYDRALAZINE HYDROCHLORIDE 20 MG: 20 INJECTION INTRAMUSCULAR; INTRAVENOUS at 06:22

## 2017-08-06 RX ADMIN — LISINOPRIL 20 MG: 10 TABLET ORAL at 05:59

## 2017-08-06 RX ADMIN — LORAZEPAM 1 MG: 2 INJECTION INTRAMUSCULAR; INTRAVENOUS at 06:21

## 2017-08-06 RX ADMIN — SODIUM CHLORIDE 1000 ML: 900 INJECTION, SOLUTION INTRAVENOUS at 05:14

## 2017-08-06 RX ADMIN — ONDANSETRON 8 MG: 2 INJECTION INTRAMUSCULAR; INTRAVENOUS at 05:14

## 2017-08-06 RX ADMIN — ALBUTEROL SULFATE 1 DOSE: 2.5 SOLUTION RESPIRATORY (INHALATION) at 05:31

## 2017-08-06 RX ADMIN — PROMETHAZINE HYDROCHLORIDE 12.5 MG: 25 INJECTION INTRAMUSCULAR; INTRAVENOUS at 07:06

## 2017-08-06 RX ADMIN — VERAPAMIL HYDROCHLORIDE 120 MG: 120 TABLET, FILM COATED, EXTENDED RELEASE ORAL at 05:59

## 2017-08-06 NOTE — ED TRIAGE NOTES
Triage note: Pt arrives via EMS with c/o not feeling well since yesterday. Pt denies pain or SOB but admits to feeling nauseated and vomiting a couple times. .

## 2017-08-06 NOTE — DISCHARGE INSTRUCTIONS
Learning About Cyclic Vomiting Syndrome  What is it? An adult or child who has cyclic vomiting syndrome (CVS) has repeated bouts of severe vomiting and nausea. Between the vomiting episodes, the person's health is normal. The cause of CVS isn't known, but it may be related to migraine headaches. What happens when you have CVS?  CVS causes severe nausea, vomiting, and drooling. You may not be able to walk or talk during an episode. You may look pale. You may have a fever and feel very tired and thirsty. Some people with CVS have belly pain and diarrhea. Bouts of vomiting can last for a few hours or a few days. People with this condition tend to have a typical pattern of attacks. For example, one person may have bouts of CVS 4 times a year and always in the morning. Another person may have 8 bouts a year and always in the evening. Some people with CVS have triggers that set off the vomiting. People have reported an infection, such as a cold, as a trigger. Other triggers include stress, menstrual cycles, and certain foods like chocolate or cheese. Children tend to have more triggers than adults do. How is CVS treated? Treatment is centered around easing the nausea and vomiting. The doctor may prescribe medicine. During very bad bouts of vomiting, your doctor may want you to stay in the hospital for a while. You may get fluids through a vein (IV) to prevent dehydration. Dehydration can be serious. Your body needs fluids to make enough blood. Without a good supply of blood, vital organs such as the heart and brain can't work as well as they should. When should you call for help? Call your doctor now or seek immediate medical care if:  · You have symptoms of dehydration, such as:  ¨ Dry eyes and a dry mouth. ¨ Passing only a little urine. ¨ Feeling thirstier than usual.  Watch closely for changes in your health, and be sure to contact your doctor if:  · You do not get better as expected.   Follow-up care is a key part of your treatment and safety. Be sure to make and go to all appointments, and call your doctor if you are having problems. It's also a good idea to know your test results and keep a list of the medicines you take. Where can you learn more? Go to http://estefania-gian.info/. Enter U679 in the search box to learn more about \"Learning About Cyclic Vomiting Syndrome. \"  Current as of: August 9, 2016  Content Version: 11.3  © 4083-0485 [employer]. Care instructions adapted under license by [employer] (which disclaims liability or warranty for this information). If you have questions about a medical condition or this instruction, always ask your healthcare professional. [address] any warranty or liability for your use of this information.

## 2017-08-06 NOTE — ED PROVIDER NOTES
HPI       35year old female with history of asthma, dm, htn, depression, substance abuse, presents with vomiting. Last admission  In early July for n/v/d and cocaine abuse. Patient states she is still using drugs, smoking and drinking alcohol. She states symptoms started yesterday morning with vomiting, sweating, diarrhea. She tried to keep water down during the day but started vomiting again last night. Now she feels nauseated. Also with sob feels like her asthma is acting up. NO past surgical history or gi history. Past Medical History:   Diagnosis Date    Asthma     Diabetes ([address]     Hypertension     Psychiatric disorder     depression       Past Surgical History:   Procedure Laterality Date    HX TUBAL LIGATION           Family History:   Problem Relation Age of Onset    Alcohol abuse Mother     Alcohol abuse Father        Social History     Social History    Marital status:      Spouse name: N/A    Number of children: N/A    Years of education: N/A     Occupational History    Not on file. Social History Main Topics    Smoking status: Never Smoker    Smokeless tobacco: Never Used    Alcohol use Yes      Comment: 2 or 3 40's a day 7/18/17    Drug use: Yes     Special: Cocaine      Comment: using millicent since age 11, no rehab in 15 yrs.  Sexual activity: Yes     Partners: Male     Birth control/ protection: Surgical     Other Topics Concern    Not on file     Social History Narrative    Single. Has 6 children, all living with her oldest son. No job, somehow got on SSDI for erroneous dx of schizophrenia. Severe polysub dep pt since age 11-- no rehab in 15 yrs. H/o arrests for prostitution. Arrested x 10. Dropped out in the 9th grade. Homeless. ALLERGIES: Aspirin    Review of Systems   Constitutional: Positive for diaphoresis. Negative for fever. HENT: Negative for congestion. Eyes: Negative for visual disturbance.    Respiratory: Positive for cough and shortness of breath. Cardiovascular: Negative for chest pain. Gastrointestinal: Positive for diarrhea, nausea and vomiting. Negative for abdominal pain. Endocrine: Negative for polyuria. Genitourinary: Negative for decreased urine volume and dysuria. Musculoskeletal: Negative for gait problem. Skin: Negative for rash. Neurological: Negative for headaches. Psychiatric/Behavioral: Negative for dysphoric mood. There were no vitals filed for this visit. Physical Exam   Constitutional: She is oriented to person, place, and time. She appears well-developed and well-nourished. She appears distressed. HENT:   Head: Normocephalic and atraumatic. Mouth/Throat: Oropharynx is clear and moist. No oropharyngeal exudate. Eyes: Conjunctivae and EOM are normal. Pupils are equal, round, and reactive to light. Right eye exhibits no discharge. Left eye exhibits no discharge. No scleral icterus. Neck: Normal range of motion. Neck supple. No JVD present. Cardiovascular: Normal rate, regular rhythm, normal heart sounds and intact distal pulses. Exam reveals no gallop and no friction rub. No murmur heard. Pulmonary/Chest: No stridor. She is in respiratory distress. She has wheezes. She has no rales. She exhibits no tenderness. Abdominal: Soft. Bowel sounds are normal. She exhibits no distension and no mass. There is no tenderness. There is no rebound and no guarding. Musculoskeletal: Normal range of motion. She exhibits no edema or tenderness. Neurological: She is alert and oriented to person, place, and time. She has normal reflexes. No cranial nerve deficit. She exhibits normal muscle tone. Coordination normal.   Skin: Skin is warm and dry. No rash noted. No erythema. Psychiatric: She has a normal mood and affect.  Her behavior is normal. Judgment and thought content normal.        [facility]  ED Course       Procedures     Solumedrol/nebs, abdomen/chest xray, labs, fluids, alcohol,urine drug screen, ekg and zofran. ED EKG interpretation:  Rhythm: normal sinus rhythm; and regular . Rate (approx.): 84; Axis: left axis deviation; P wave: normal; QRS interval: normal ; ST/T wave: non-specific changes;. This EKG was interpreted by Hermilo Christianson MD,ED Provider. Labs ok. Still vomiting despite zofran and ativan. xrays ok, abdominal exam is benign. Will try phenergan. Care signed over to Dr. Rosaura Caceres. May need admission if continuing to vomit.

## 2017-08-06 NOTE — PROGRESS NOTES
Requested from nursing to assist with obtaining number for [facility]. Called [phone] to request medicaid cab for pt's transport home, pt will be waiting in ED waiting room. Updated pt's home address, per pt to [address], [address]. Did note pt was picked up via EMS from different address earlier this morning, [address], [address] Reference number for today's trip is #[id number]. Trip can take 30min to 3 hours.     VICTOR M Abraham

## 2017-09-22 ENCOUNTER — HOSPITAL ENCOUNTER (EMERGENCY)
Age: 42
Discharge: HOME | End: 2017-09-22
Attending: EMERGENCY MEDICINE
Payer: MEDICAID

## 2017-09-22 VITALS
HEART RATE: 79 BPM | OXYGEN SATURATION: 100 % | HEIGHT: 63 IN | WEIGHT: 260 LBS | RESPIRATION RATE: 22 BRPM | DIASTOLIC BLOOD PRESSURE: 86 MMHG | SYSTOLIC BLOOD PRESSURE: 191 MMHG | BODY MASS INDEX: 46.07 KG/M2 | TEMPERATURE: 98.1 F

## 2017-09-22 DIAGNOSIS — K52.9 GASTROENTERITIS, ACUTE: Primary | ICD-10-CM

## 2017-09-22 DIAGNOSIS — F10.10 ALCOHOL ABUSE: ICD-10-CM

## 2017-09-22 LAB
ALBUMIN SERPL-MCNC: 3.6 G/DL (ref 3.5–5)
ALBUMIN/GLOB SERPL: 0.8 {RATIO} (ref 1.1–2.2)
ALP SERPL-CCNC: 75 U/L (ref 45–117)
ALT SERPL-CCNC: 81 U/L (ref 12–78)
ANION GAP SERPL CALC-SCNC: 12 MMOL/L (ref 5–15)
APPEARANCE UR: CLEAR
AST SERPL-CCNC: 148 U/L (ref 15–37)
BACTERIA URNS QL MICRO: NEGATIVE /HPF
BASOPHILS # BLD: 0 K/UL
BASOPHILS NFR BLD: 0 %
BILIRUB SERPL-MCNC: 1.5 MG/DL (ref 0.2–1)
BILIRUB UR QL: NEGATIVE
BUN SERPL-MCNC: 6 MG/DL (ref 6–20)
BUN/CREAT SERPL: 6 (ref 12–20)
CALCIUM SERPL-MCNC: 9 MG/DL (ref 8.5–10.1)
CHLORIDE SERPL-SCNC: 101 MMOL/L (ref 97–108)
CO2 SERPL-SCNC: 24 MMOL/L (ref 21–32)
COLOR UR: ABNORMAL
CREAT SERPL-MCNC: 1 MG/DL (ref 0.55–1.02)
DIFFERENTIAL METHOD BLD: ABNORMAL
EOSINOPHIL # BLD: 0 K/UL
EOSINOPHIL NFR BLD: 0 %
EPITH CASTS URNS QL MICRO: ABNORMAL /LPF
ERYTHROCYTE [DISTWIDTH] IN BLOOD BY AUTOMATED COUNT: 21.4 % (ref 11.5–14.5)
GLOBULIN SER CALC-MCNC: 4.6 G/DL (ref 2–4)
GLUCOSE SERPL-MCNC: 151 MG/DL (ref 65–100)
GLUCOSE UR STRIP.AUTO-MCNC: 100 MG/DL
HCG UR QL: NEGATIVE
HCT VFR BLD AUTO: 32.5 % (ref 35–47)
HGB BLD-MCNC: 9.3 G/DL (ref 11.5–16)
HGB UR QL STRIP: NEGATIVE
KETONES UR QL STRIP.AUTO: NEGATIVE MG/DL
LEUKOCYTE ESTERASE UR QL STRIP.AUTO: NEGATIVE
LYMPHOCYTES # BLD: 1.4 K/UL
LYMPHOCYTES NFR BLD: 23 %
MCH RBC QN AUTO: 18.3 PG (ref 26–34)
MCHC RBC AUTO-ENTMCNC: 28.6 G/DL (ref 30–36.5)
MCV RBC AUTO: 64 FL (ref 80–99)
MONOCYTES # BLD: 0.1 K/UL
MONOCYTES NFR BLD: 2 %
NEUTS SEG # BLD: 4.6 K/UL
NEUTS SEG NFR BLD: 75 %
NITRITE UR QL STRIP.AUTO: NEGATIVE
PH UR STRIP: 7 [PH] (ref 5–8)
PLATELET # BLD AUTO: 209 K/UL (ref 150–400)
POTASSIUM SERPL-SCNC: 3.6 MMOL/L (ref 3.5–5.1)
PROT SERPL-MCNC: 8.2 G/DL (ref 6.4–8.2)
PROT UR STRIP-MCNC: NEGATIVE MG/DL
RBC # BLD AUTO: 5.08 M/UL (ref 3.8–5.2)
RBC #/AREA URNS HPF: ABNORMAL /HPF (ref 0–5)
RBC MORPH BLD: ABNORMAL
RBC MORPH BLD: ABNORMAL
SODIUM SERPL-SCNC: 137 MMOL/L (ref 136–145)
SP GR UR REFRACTOMETRY: 1.01 (ref 1–1.03)
UA: UC IF INDICATED,UAUC: ABNORMAL
UROBILINOGEN UR QL STRIP.AUTO: 1 EU/DL (ref 0.2–1)
WBC # BLD AUTO: 6.1 K/UL (ref 3.6–11)
WBC MORPH BLD: ABNORMAL
WBC URNS QL MICRO: ABNORMAL /HPF (ref 0–4)

## 2017-09-22 PROCEDURE — 74011250636 HC RX REV CODE- 250/636: Performed by: EMERGENCY MEDICINE

## 2017-09-22 PROCEDURE — 99285 EMERGENCY DEPT VISIT HI MDM: CPT

## 2017-09-22 PROCEDURE — 85025 COMPLETE CBC W/AUTO DIFF WBC: CPT | Performed by: EMERGENCY MEDICINE

## 2017-09-22 PROCEDURE — 81001 URINALYSIS AUTO W/SCOPE: CPT | Performed by: EMERGENCY MEDICINE

## 2017-09-22 PROCEDURE — 96375 TX/PRO/DX INJ NEW DRUG ADDON: CPT

## 2017-09-22 PROCEDURE — 74011250637 HC RX REV CODE- 250/637: Performed by: EMERGENCY MEDICINE

## 2017-09-22 PROCEDURE — 96374 THER/PROPH/DIAG INJ IV PUSH: CPT

## 2017-09-22 PROCEDURE — 36415 COLL VENOUS BLD VENIPUNCTURE: CPT | Performed by: EMERGENCY MEDICINE

## 2017-09-22 PROCEDURE — 81025 URINE PREGNANCY TEST: CPT

## 2017-09-22 PROCEDURE — 80053 COMPREHEN METABOLIC PANEL: CPT | Performed by: EMERGENCY MEDICINE

## 2017-09-22 PROCEDURE — 96361 HYDRATE IV INFUSION ADD-ON: CPT

## 2017-09-22 RX ORDER — PROMETHAZINE HYDROCHLORIDE 25 MG/1
25 SUPPOSITORY RECTAL
Qty: 12 SUPPOSITORY | Refills: 0 | Status: SHIPPED | OUTPATIENT
Start: 2017-09-22 | End: 2017-09-29

## 2017-09-22 RX ORDER — ONDANSETRON 4 MG/1
4 TABLET, ORALLY DISINTEGRATING ORAL
Qty: 10 TAB | Refills: 0 | Status: SHIPPED | OUTPATIENT
Start: 2017-09-22 | End: 2018-05-17

## 2017-09-22 RX ORDER — ONDANSETRON 4 MG/1
4 TABLET, ORALLY DISINTEGRATING ORAL
Status: COMPLETED | OUTPATIENT
Start: 2017-09-22 | End: 2017-09-22

## 2017-09-22 RX ORDER — METOCLOPRAMIDE HYDROCHLORIDE 5 MG/ML
10 INJECTION INTRAMUSCULAR; INTRAVENOUS
Status: COMPLETED | OUTPATIENT
Start: 2017-09-22 | End: 2017-09-22

## 2017-09-22 RX ORDER — ONDANSETRON 2 MG/ML
4 INJECTION INTRAMUSCULAR; INTRAVENOUS
Status: COMPLETED | OUTPATIENT
Start: 2017-09-22 | End: 2017-09-22

## 2017-09-22 RX ADMIN — SODIUM CHLORIDE 1000 ML: 900 INJECTION, SOLUTION INTRAVENOUS at 09:01

## 2017-09-22 RX ADMIN — METOCLOPRAMIDE 10 MG: 5 INJECTION, SOLUTION INTRAMUSCULAR; INTRAVENOUS at 09:59

## 2017-09-22 RX ADMIN — ONDANSETRON 4 MG: 2 INJECTION, SOLUTION INTRAMUSCULAR; INTRAVENOUS at 09:01

## 2017-09-22 RX ADMIN — ONDANSETRON 4 MG: 4 TABLET, ORALLY DISINTEGRATING ORAL at 07:42

## 2017-09-22 NOTE — ED NOTES
Patient urinated on herself when vomiting when she first came in and is not able to urinate at this time. Patient is in her room resting alert and oriented x 3 and is in no acute distress.

## 2017-09-22 NOTE — ED PROVIDER NOTES
HPI Comments: Giovany Snider is a 35 y.o. female with PMhx significant for DM, HTN, and asthma who presents via EMS to the ED with cc of N/V/D with intermittent diaphoresis since 0200 last night. Pt reports eating a store-bought pizza yesterday that had been sitting out overnight. The pizza contained sausage. She denies a hx of similar symptoms, or any sick contact. She reports drinking 2-3 40 oz beers a day and endorses the need for counseling. She reports previously attending alcoholics anonymous meetings and notes she had a good sponsor who doesn't know she is still drinking. Pt states her LMP was ~09/08/17. She specifically denies any fever. Social Hx: - Tobacco, + (2-3 \"40's\" daily) EtOH, +(cocaine since 10 yo) Illicit Drugs      There are no other complaints, changes or physical findings at this time. The history is provided by the patient. No  was used. Past Medical History:   Diagnosis Date    Asthma     Diabetes ([address]     Hypertension     Psychiatric disorder     depression    Sleep apnea        Past Surgical History:   Procedure Laterality Date    HX TUBAL LIGATION           Family History:   Problem Relation Age of Onset    Alcohol abuse Mother     Alcohol abuse Father        Social History     Social History    Marital status:      Spouse name: N/A    Number of children: N/A    Years of education: N/A     Occupational History    Not on file. Social History Main Topics    Smoking status: Never Smoker    Smokeless tobacco: Never Used    Alcohol use Yes      Comment: 2 or 3 40's a day 7/18/17    Drug use: Yes     Special: Cocaine      Comment: using millicent since age 11, no rehab in 15 yrs.  Sexual activity: Yes     Partners: Male     Birth control/ protection: Surgical     Other Topics Concern    Not on file     Social History Narrative    Single. Has 6 children, all living with her oldest son.  No job, somehow got on SSDI for erroneous dx of schizophrenia. Severe polysub dep pt since age 11-- no rehab in 15 yrs. H/o arrests for prostitution. Arrested x 10. Dropped out in the 9th grade. Homeless. ALLERGIES: Aspirin    Review of Systems   Constitutional: Positive for diaphoresis. Negative for chills and fever. HENT: Negative for congestion, rhinorrhea and sore throat. Eyes: Negative for discharge and redness. Respiratory: Negative for cough and shortness of breath. Cardiovascular: Negative for chest pain. Gastrointestinal: Positive for diarrhea, nausea and vomiting. Negative for abdominal distention, abdominal pain and constipation. Genitourinary: Negative for decreased urine volume and urgency. Musculoskeletal: Negative for arthralgias and back pain. Skin: Negative for rash. Neurological: Negative for dizziness, weakness, numbness and headaches. Psychiatric/Behavioral: Negative for confusion and decreased concentration. All other systems reviewed and are negative. Vitals:    09/22/17 0833 09/22/17 0906 09/22/17 0930 09/22/17 0958   BP:  162/90 191/86    Pulse: (!) 119 79     Resp: 22      Temp:       SpO2: 100% 100% 99% 100%   Weight:       Height:                Physical Exam   Constitutional: She is oriented to person, place, and time. Obese  Appears uncomfortable   HENT:   Head: Normocephalic and atraumatic. Mouth/Throat: Oropharynx is clear and moist.   Eyes: Conjunctivae and EOM are normal.   Neck: Normal range of motion and full passive range of motion without pain. Neck supple. Cardiovascular: Normal rate, regular rhythm, S1 normal, S2 normal, normal heart sounds, intact distal pulses and normal pulses. No murmur heard. Pulmonary/Chest: Effort normal and breath sounds normal. No respiratory distress. She has no wheezes. Abdominal: Soft. Normal appearance and bowel sounds are normal. She exhibits no distension. There is no tenderness. There is no rebound.    Musculoskeletal: Normal range of motion. Neurological: She is alert and oriented to person, place, and time. She has normal strength. Skin: Skin is warm, dry and intact. No rash noted. Psychiatric: She has a normal mood and affect. Her speech is normal and behavior is normal. Judgment and thought content normal.   Nursing note and vitals reviewed. MDM  Number of Diagnoses or Management Options  Diagnosis management comments: DDx: gastroenteritis, cyclic vomiting       Amount and/or Complexity of Data Reviewed  Clinical lab tests: ordered and reviewed  Review and summarize past medical records: yes    Patient Progress  Patient progress: stable    ED Course       Procedures    PROGRESS NOTE:   8:15 AM  Pt has stopped vomiting. She reports she is unable to give urine due to urinating on herself PTA. PROGRESS NOTE:   8:20 AM  Pt tolerated PO. She states she is feeling well enough to go home. PROGRESS NOTE:   8:41 AM  Pt vomited again and urinated on herself. PROGRESS NOTE:   9:44 AM  Pt states she is still nauseous. PROGRESS NOTE:   11:05 AM  Pt has tolerated water and states she is feeling better. Will prepare for discharge. MEDICATIONS GIVEN:  Medications   ondansetron (ZOFRAN ODT) tablet 4 mg (4 mg Oral Given 9/22/17 0742)   ondansetron (ZOFRAN) injection 4 mg (4 mg IntraVENous Given 9/22/17 0901)   sodium chloride 0.9 % bolus infusion 1,000 mL (0 mL IntraVENous IV Completed 9/22/17 1000)   metoclopramide HCl (REGLAN) injection 10 mg (10 mg IntraVENous Given 9/22/17 0959)       IMPRESSION:  1. Gastroenteritis, acute    2. Alcohol abuse        PLAN:  1. Current Discharge Medication List      START taking these medications    Details   ondansetron (ZOFRAN ODT) 4 mg disintegrating tablet Take 1 Tab by mouth every eight (8) hours as needed for Nausea. Qty: 10 Tab, Refills: 0      promethazine (PHENERGAN) 25 mg suppository Insert 1 Suppository into rectum every six (6) hours as needed for Nausea for up to 7 days.   Qty: 12 Suppository, Refills: 0           2. Follow-up Information     Follow up With Details Comments Contact Info    One of the PCP's from the clinic Call      [facility] EMERGENCY DEPT  As needed, If symptoms worsen [address]  [phone]        3. Return to ED if worse     DISCHARGE NOTE:  11:12 AM  The patient has been re-evaluated and is ready for discharge. Reviewed available results with patient. Counseled patient on diagnosis and care plan. Patient has expressed understanding, and all questions have been answered. Patient agrees with plan and agrees to follow up as recommended, or return to the ED if their symptoms worsen. Discharge instructions have been provided and explained to the patient, along with reasons to return to the ED. ATTESTATION:  This note is prepared by Flakito Martinez, acting as Scribe for Melanie Martinez MD.     Melanie Martinez MD: The scribe's documentation has been prepared under my direction and personally reviewed by me in its entirety. I confirm that the note above accurately reflects all work, treatment, procedures, and medical decision making performed by me.

## 2017-09-22 NOTE — DISCHARGE INSTRUCTIONS
Gastroenteritis: Care Instructions  Your Care Instructions  Gastroenteritis is an illness that may cause nausea, vomiting, and diarrhea. It is sometimes called \"stomach flu. \" It can be caused by bacteria or a virus. You will probably begin to feel better in 1 to 2 days. In the meantime, get plenty of rest and make sure you do not become dehydrated. Dehydration occurs when your body loses too much fluid. Follow-up care is a key part of your treatment and safety. Be sure to make and go to all appointments, and call your doctor if you are having problems. Its also a good idea to know your test results and keep a list of the medicines you take. How can you care for yourself at home? · If your doctor prescribed antibiotics, take them as directed. Do not stop taking them just because you feel better. You need to take the full course of antibiotics. · Drink plenty of fluids to prevent dehydration, enough so that your urine is light yellow or clear like water. Choose water and other caffeine-free clear liquids until you feel better. If you have kidney, heart, or liver disease and have to limit fluids, talk with your doctor before you increase your fluid intake. · Drink fluids slowly, in frequent, small amounts, because drinking too much too fast can cause vomiting. · Begin eating mild foods, such as dry toast, yogurt, applesauce, bananas, and rice. Avoid spicy, hot, or high-fat foods, and do not drink alcohol or caffeine for a day or two. Do not drink milk or eat ice cream until you are feeling better. How to prevent gastroenteritis  · Keep hot foods hot and cold foods cold. · Do not eat meats, dressings, salads, or other foods that have been kept at room temperature for more than 2 hours. · Use a thermometer to check your refrigerator. It should be between 34°F and 40°F.  · Defrost meats in the refrigerator or microwave, not on the kitchen counter. · Keep your hands and your kitchen clean.  Wash your hands, cutting boards, and countertops with hot soapy water frequently. · Cook meat until it is well done. · Do not eat raw eggs or uncooked sauces made with raw eggs. · Do not take chances. If food looks or tastes spoiled, throw it out. When should you call for help? Call 911 anytime you think you may need emergency care. For example, call if:  · You vomit blood or what looks like coffee grounds. · You passed out (lost consciousness). · You pass maroon or very bloody stools. Call your doctor now or seek immediate medical care if:  · You have severe belly pain. · You have signs of needing more fluids. You have sunken eyes, a dry mouth, and pass only a little dark urine. · You feel like you are going to faint. · You have increased belly pain that does not go away in 1 to 2 days. · You have new or increased nausea, or you are vomiting. · You have a new or higher fever. · Your stools are black and tarlike or have streaks of blood. Watch closely for changes in your health, and be sure to contact your doctor if:  · You are dizzy or lightheaded. · You urinate less than usual, or your urine is dark yellow or brown. · You do not feel better with each day that goes by. Where can you learn more? Go to http://estefania-gian.info/. Enter N142 in the search box to learn more about \"Gastroenteritis: Care Instructions. \"  Current as of: March 3, 2017  Content Version: 11.3  © 8009-9462 [employer]. Care instructions adapted under license by [employer] (which disclaims liability or warranty for this information). If you have questions about a medical condition or this instruction, always ask your healthcare professional. [address] any warranty or liability for your use of this information. Food Poisoning: Care Instructions  Your Care Instructions  Food poisoning occurs when you eat foods that contain harmful germs.  Food can be contaminated while it is growing, during processing, or when it is prepared. Fresh fruits and vegetables also can be contaminated if they are washed in contaminated water. You may have become ill after eating undercooked meat or eggs or other unsafe foods. Cooking foods thoroughly and storing them properly can help prevent food poisoning. There are many types of food poisoning. Your symptoms depend on the type of food poisoning you have. You will probably begin to feel better in 1 or 2 days. In the meantime, get plenty of rest and make sure that you do not become dehydrated. Follow-up care is a key part of your treatment and safety. Be sure to make and go to all appointments, and call your doctor if you are having problems. It's also a good idea to know your test results and keep a list of the medicines you take. How can you care for yourself at home? · To prevent dehydration, drink plenty of fluids. Choose water and other caffeine-free clear liquids until you feel better. If you have kidney, heart, or liver disease and have to limit fluids, talk with your doctor before you increase the amount of fluids you drink. · Begin eating small amounts of mild, low-fat foods, depending on how you feel. Try foods like rice, dry crackers, bananas, and applesauce. ¨ Avoid spicy foods, alcohol, and coffee until 48 hours after all symptoms have disappeared. ¨ Avoid chewing gum that contains sorbitol. ¨ Avoid dairy products for 3 days after symptoms disappear. · Take your medicines as prescribed. Call your doctor if you think you are having a problem with your medicine. You will get more details on the specific medicines your doctor prescribes. To prevent food poisoning  · Keep hot foods hot and cold foods cold. · Do not eat meats, dressings, salads, or other foods that have been kept at room temperature for more than 2 hours. · Use a thermometer to check your refrigerator.  It should be between 34°F and 40°F.  · Defrost meats in the refrigerator or microwave, not on the kitchen counter. · Keep your hands and your kitchen clean. Wash your hands, cutting boards, and countertops with hot, soapy water. If you use the same cutting board for chopping vegetables and preparing raw meat, be sure to wash the cutting board with hot, soapy water between each use. · Cook meat until it is well done. · Do not eat raw eggs or uncooked dough or sauces made with raw eggs. · Do not take chances. If you think food looks or tastes spoiled, throw it out. When should you call for help? Call 911 anytime you think you may need emergency care. For example, call if:  · You passed out (lost consciousness). Call your doctor now or seek immediate medical care if:  · You have new or worse belly pain. · You have a new or higher fever. · You are dizzy or lightheaded, or you feel like you may faint. · You have symptoms of dehydration, such as:  ¨ Dry eyes and a dry mouth. ¨ Passing only a little urine. ¨ Feeling thirstier than normal.  · You cannot keep down medicine or fluids. · You have new or more blood in stools. · You have new or worse vomiting or diarrhea. Watch closely for changes in your health, and be sure to contact your doctor if:  · You do not get better as expected. Where can you learn more? Go to http://estefania-gian.info/. Enter M593 in the search box to learn more about \"Food Poisoning: Care Instructions. \"  Current as of: March 3, 2017  Content Version: 11.3  © 3017-1317 [employer]. Care instructions adapted under license by [employer] (which disclaims liability or warranty for this information). If you have questions about a medical condition or this instruction, always ask your healthcare professional. [place] [address] any warranty or liability for your use of this information. Learning About Alcohol Misuse  What is alcohol misuse?   Alcohol misuse means drinking so much that it causes problems for you or others. Early problems with alcohol can start at home. You may argue with loved ones about how much you're drinking. Your job may be affected because of drinking. You may drink when it's dangerous or illegal, such as when you drive. Drinking too much for a long time can lead to health conditions like high blood pressure and liver problems. What are the symptoms? Symptoms of alcohol misuse may include:  · Drinking much more than you planned. · Drinking even though it's causing problems for you or others. · Putting yourself in situations where you might get hurt. · Wanting to cut down or stop drinking, but not being able to. · Feeling guilty about how much you're drinking. How is alcohol misuse treated? Getting help for problems with alcohol is up to you. But you don't have to do it alone. There are many people and kinds of treatments to help with alcohol problems. Talking to your doctor is the first step. When you get a doctor's help, treatment for alcohol problems can be safer and quicker. Treatment options can include:  · Treatment programs. Examples are group therapy, one or more types of counseling, and alcohol education. · Medicines. A doctor or counselor can help you know what kinds of medicines might help with cravings. · Free social support groups. These groups include AA (Alcoholics Anonymous) and SMART (Self-Management and Recovery Training). Your doctor can help you decide which type of program is best for you. Follow-up care is a key part of your treatment and safety. Be sure to make and go to all appointments, and call your doctor if you are having problems. It's also a good idea to know your test results and keep a list of the medicines you take. Where can you learn more? Go to http://estefania-gian.info/. Enter [id number] in the search box to learn more about \"Learning About Alcohol Misuse. \"  Current as of: January 3, 2017  Content Version: 11.3  © 5398-2909 [employer]. Care instructions adapted under license by "[employer] (which disclaims liability or warranty for this information). If you have questions about a medical condition or this instruction, always ask your healthcare professional. [address] any warranty or liability for your use of this information.

## 2017-09-22 NOTE — ED NOTES
Emergency Department Nursing Plan of Care       The Nursing Plan of Care is developed from the Nursing assessment and Emergency Department Attending provider initial evaluation. The plan of care may be reviewed in the ED Provider note.     The Plan of Care was developed with the following considerations:   Patient / Family readiness to learn indicated by:verbalized understanding  Persons(s) to be included in education: patient  Barriers to Learning/Limitations:No    Signed     Esme Zimmer RN    9/22/2017   7:34 AM

## 2017-12-15 ENCOUNTER — HOSPITAL ENCOUNTER (EMERGENCY)
Age: 42
Discharge: HOME | End: 2017-12-15
Attending: EMERGENCY MEDICINE
Payer: MEDICAID

## 2017-12-15 VITALS
HEART RATE: 70 BPM | BODY MASS INDEX: 47.84 KG/M2 | WEIGHT: 260 LBS | DIASTOLIC BLOOD PRESSURE: 94 MMHG | TEMPERATURE: 97.9 F | SYSTOLIC BLOOD PRESSURE: 143 MMHG | HEIGHT: 62 IN | OXYGEN SATURATION: 99 % | RESPIRATION RATE: 18 BRPM

## 2017-12-15 DIAGNOSIS — L03.011 PARONYCHIA OF RIGHT MIDDLE FINGER: Primary | ICD-10-CM

## 2017-12-15 DIAGNOSIS — H60.91 OTITIS EXTERNA OF RIGHT EAR, UNSPECIFIED CHRONICITY, UNSPECIFIED TYPE: ICD-10-CM

## 2017-12-15 PROCEDURE — 74011000250 HC RX REV CODE- 250: Performed by: NURSE PRACTITIONER

## 2017-12-15 PROCEDURE — 75810000289 HC I&D ABSCESS SIMP/COMP/MULT

## 2017-12-15 PROCEDURE — 99283 EMERGENCY DEPT VISIT LOW MDM: CPT

## 2017-12-15 PROCEDURE — 74011250637 HC RX REV CODE- 250/637: Performed by: NURSE PRACTITIONER

## 2017-12-15 RX ORDER — CEPHALEXIN 500 MG/1
500 CAPSULE ORAL 4 TIMES DAILY
Qty: 28 CAP | Refills: 0 | Status: SHIPPED | OUTPATIENT
Start: 2017-12-15 | End: 2017-12-22

## 2017-12-15 RX ORDER — LIDOCAINE HYDROCHLORIDE 20 MG/ML
10 INJECTION, SOLUTION INFILTRATION; PERINEURAL
Status: COMPLETED | OUTPATIENT
Start: 2017-12-15 | End: 2017-12-15

## 2017-12-15 RX ORDER — NEOMYCIN SULFATE, POLYMYXIN B SULFATE, HYDROCORTISONE 3.5; 10000; 1 MG/ML; [USP'U]/ML; MG/ML
3-4 SOLUTION/ DROPS AURICULAR (OTIC) 4 TIMES DAILY
Qty: 10 ML | Refills: 0 | Status: ON HOLD | OUTPATIENT
Start: 2017-12-15 | End: 2018-06-06

## 2017-12-15 RX ORDER — ACETAMINOPHEN 500 MG
1000 TABLET ORAL
Qty: 20 TAB | Refills: 0 | Status: SHIPPED | OUTPATIENT
Start: 2017-12-15 | End: 2018-05-17

## 2017-12-15 RX ORDER — HYDROCODONE BITARTRATE AND ACETAMINOPHEN 5; 325 MG/1; MG/1
1 TABLET ORAL
Status: COMPLETED | OUTPATIENT
Start: 2017-12-15 | End: 2017-12-15

## 2017-12-15 RX ADMIN — LIDOCAINE HYDROCHLORIDE 200 MG: 20 INJECTION, SOLUTION INFILTRATION; PERINEURAL at 20:44

## 2017-12-15 RX ADMIN — HYDROCODONE BITARTRATE AND ACETAMINOPHEN 1 TABLET: 5; 325 TABLET ORAL at 21:10

## 2017-12-16 NOTE — ED NOTES
Patient has been instructed that they have been given Norco which contains opioids, benzodiazepines, or other sedating drugs. Patient is aware that they  will need to refrain from driving or operating heavy machinery after taking this medication. Patient also instructed that they need to avoid drinking alcohol and using other products containing opioids, benzodiazepines, or other sedating drugs. Patient verbalized understanding. Pt  at bedside.

## 2017-12-16 NOTE — ED NOTES
Discharge instructions were given to the patient by ADDI Persaud. The patient left the Emergency Department ambulatory, alert and oriented and in no acute distress with 3 prescriptions. The patient was encouraged to call or return to the ED for worsening issues or problems and was encouraged to schedule a follow up appointment for continuing care. The patient verbalized understanding of discharge instructions and prescriptions, all questions were answered. The patient has no further concerns at this time.

## 2017-12-16 NOTE — ED NOTES
Pt presents to ED ambulatory complaining of pain, swelling, discoloration to right 3rd digit and right ear pain x2 days. Pt noted to bite her nails very short and educated this could cause infections. Pt is alert and oriented x 4, RR even and unlabored, skin is warm and dry. Assessment completed and pt updated on plan of care. Emergency Department Nursing Plan of Care       The Nursing Plan of Care is developed from the Nursing assessment and Emergency Department Attending provider initial evaluation. The plan of care may be reviewed in the ED Provider note.     The Plan of Care was developed with the following considerations:   Patient / Family readiness to learn indicated by:verbalized understanding  Persons(s) to be included in education: patient and family  Barriers to Learning/Limitations:No    Signed     Vince Crigler, RN    12/15/2017   8:23 PM

## 2017-12-16 NOTE — ED PROVIDER NOTES
Patient is a 38 y.o. female presenting with ear pain and finger pain. The history is provided by the patient. No  was used. Ear Pain    This is a new problem. The current episode started more than 2 days ago. The problem occurs daily. The problem has not changed since onset. Patient complains that the right ear is affected. There has been no fever. The pain is at a severity of 9/10. The pain is moderate. Pertinent negatives include no ear discharge, no headaches, no rhinorrhea, no abdominal pain, no neck pain and no rash. The risk factors include smoking/tobacco exposure. Her past medical history does not include chronic ear infection. Finger Pain    This is a new problem. The current episode started 2 days ago. The problem occurs daily. The problem has not changed since onset. Pain location: r third finger. The pain is at a severity of 7/10. The pain is moderate. Pertinent negatives include full range of motion and no neck pain. The symptoms are aggravated by palpation. She has tried nothing for the symptoms. n/a        Past Medical History:   Diagnosis Date    Asthma     Diabetes ([facility] [address]     Hypertension     Psychiatric disorder     depression    Sleep apnea        Past Surgical History:   Procedure Laterality Date    HX TUBAL LIGATION           Family History:   Problem Relation Age of Onset    Alcohol abuse Mother     Alcohol abuse Father        Social History     Social History    Marital status:      Spouse name: N/A    Number of children: N/A    Years of education: N/A     Occupational History    Not on file. Social History Main Topics    Smoking status: Never Smoker    Smokeless tobacco: Never Used    Alcohol use Yes      Comment: 2 or 3 40's a day 7/18/17    Drug use: Yes     Special: Cocaine      Comment: using millicent since age 11, no rehab in 15 yrs.     Sexual activity: Yes     Partners: Male     Birth control/ protection: Surgical     Other Topics Concern  Not on file     Social History Narrative    Single. Has 6 children, all living with her oldest son. No job, somehow got on SSDI for erroneous dx of schizophrenia. Severe polysub dep pt since age 11-- no rehab in 15 yrs. H/o arrests for prostitution. Arrested x 10. Dropped out in the 9th grade. Homeless. ALLERGIES: Aspirin    Review of Systems   Constitutional: Negative for fatigue and fever. HENT: Positive for ear pain. Negative for ear discharge and rhinorrhea. Respiratory: Negative for shortness of breath and wheezing. Cardiovascular: Negative for chest pain and palpitations. Gastrointestinal: Negative for abdominal pain. Musculoskeletal: Positive for arthralgias (finger pain). Negative for myalgias, neck pain and neck stiffness. Skin: Negative for pallor and rash. Finger infection   Neurological: Negative for dizziness, tremors, weakness and headaches. Hematological: Negative for adenopathy. Psychiatric/Behavioral: Negative for agitation and behavioral problems. All other systems reviewed and are negative. Vitals:    12/15/17 1956   BP: (!) 143/94   Pulse: 70   Resp: 18   Temp: 97.9 °F (36.6 °C)   SpO2: 99%   Weight: 117.9 kg (260 lb)   Height: 5' 2\" (1.575 m)            Physical Exam   Constitutional: She is oriented to person, place, and time. She appears well-developed and well-nourished. No distress. HENT:   Head: Normocephalic and atraumatic. Right Ear: Hearing and tympanic membrane normal. There is drainage and tenderness (tragus). Left Ear: External ear normal.   Nose: Nose normal.   Mouth/Throat: Oropharynx is clear and moist.   Eyes: Conjunctivae are normal.   Neck: Normal range of motion. Neck supple. Cardiovascular: Normal rate, regular rhythm and normal heart sounds. Pulmonary/Chest: Effort normal and breath sounds normal. No respiratory distress. She has no wheezes. Abdominal: Soft. Bowel sounds are normal. There is no tenderness. Musculoskeletal: Normal range of motion. Hands:  Swelling infection   Lymphadenopathy:     She has no cervical adenopathy. Neurological: She is alert and oriented to person, place, and time. No cranial nerve deficit. Coordination normal.   Skin: Skin is warm and dry. No rash noted. Psychiatric: She has a normal mood and affect. Her behavior is normal. Judgment and thought content normal.   Nursing note and vitals reviewed. MDM  Number of Diagnoses or Management Options  Otitis externa of right ear, unspecified chronicity, unspecified type:   Paronychia of right middle finger:   Diagnosis management comments: DDX felon paronychia AOM OE       Amount and/or Complexity of Data Reviewed  Discuss the patient with other providers: yes      ED Course       I&D [facility]ID[facility]  Date/Time: 12/15/2017 8:40 PM  Performed by: Altagracia Corrales  Authorized by: Altagracia Corrales     Consent:     Consent obtained:  Verbal and written    Consent given by:  Patient    Risks discussed:  Bleeding and incomplete drainage    Alternatives discussed:  No treatment  Location:     Type:  Abscess    Size:  .5cm    Location: r third finger. Pre-procedure details:     Skin preparation:  Betadine  Anesthesia (see MAR for exact dosages): Anesthesia method:  Local infiltration    Local anesthetic:  Lidocaine 2% w/o epi (2 ml)  Procedure type:     Complexity:  Simple  Procedure details:     Needle aspiration: no      Incision types:  Stab incision    Incision depth:  Submucosal    Scalpel blade:  11    Drainage:  Bloody    Drainage amount:  Scant    Packing materials:  None  Post-procedure details:     Patient tolerance of procedure: Tolerated well, no immediate complications        Pt has been reevaluated. There are no new complaints, changes, or physical findings at this time. Medications have been reviewed w/ pt and/or family. Pt and/or family's questions have been answered.  Pt and/or family expressed good understanding of the dx/tx/rx and is in agreement with plan of care. Pt instructed and agreed to f/u w/PCP and to return to ED upon further deterioration. Pt is ready for discharge. LABORATORY TESTS:  No results found for this or any previous visit (from the past 12 hour(s)). IMAGING RESULTS:  No orders to display     No results found. MEDICATIONS GIVEN:  Medications   lidocaine (XYLOCAINE) 20 mg/mL (2 %) injection 200 mg (200 mg IntraDERMal Given by Provider 12/15/17 2044)   HYDROcodone-acetaminophen (NORCO) 5-325 mg per tablet 1 Tab (1 Tab Oral Given 12/15/17 2110)       IMPRESSION:  1. Paronychia of right middle finger    2. Otitis externa of right ear, unspecified chronicity, unspecified type        PLAN:  1. Discharge Medication List as of 12/15/2017  8:57 PM      START taking these medications    Details   neomycin-polymyxin-hydrocortisone (CORTISPORIN) otic solution 3-4 Drops by Otic route four (4) times daily. , Print, Disp-10 mL, R-0      cephALEXin (KEFLEX) 500 mg capsule Take 1 Cap by mouth four (4) times daily for 7 days. , Print, Disp-28 Cap, R-0      acetaminophen (TYLENOL EXTRA STRENGTH) 500 mg tablet Take 2 Tabs by mouth every six (6) hours as needed for Pain., Print, Disp-20 Tab, R-0         CONTINUE these medications which have NOT CHANGED    Details   ondansetron (ZOFRAN ODT) 4 mg disintegrating tablet Take 1 Tab by mouth every eight (8) hours as needed for Nausea., Normal, Disp-10 Tab, R-0      albuterol (PROVENTIL, VENTOLIN) 90 mcg/actuation inhaler Take 1-2 Puffs by inhalation every four (4) hours as needed for Wheezing or Shortness of Breath., Normal, Disp-17 g, R-0      thiamine (B-1) 100 mg tablet Take 1 Tab by mouth daily. , Print, Disp-30 Tab, R-0      folic acid (FOLVITE) 1 mg tablet Take 1 Tab by mouth daily. , Print, Disp-30 Tab, R-0      metFORMIN (GLUCOPHAGE) 1,000 mg tablet Take 1 Tab by mouth two (2) times daily (with meals). , Print, Disp-60 Tab, R-0      verapamil ER (VERELAN) 120 mg ER capsule Take 1 Cap by mouth daily. , Print, Disp-30 Cap, R-0      lisinopril (PRINIVIL, ZESTRIL) 20 mg tablet Take 1 Tab by mouth daily. , Print, Disp-30 Tab, R-0      glimepiride (AMARYL) 4 mg tablet Take 1 Tab by mouth every morning., Print, Disp-30 Tab, R-0      albuterol-ipratropium (DUO-NEB) 2.5 mg-0.5 mg/3 ml nebu 3 mL by Nebulization route every four (4) hours as needed. , Print, Disp-30 Nebule, R-0      fluticasone-salmeterol (ADVAIR DISKUS) 250-50 mcg/dose diskus inhaler Take 1 Puff by inhalation two (2) times a day., Historical Med      loratadine (CLARITIN) 10 mg tablet Take 1 Tab by mouth daily. , Normal, Disp-20 Tab, R-0           2.    Follow-up Information     Follow up With Details Comments Contact Justo Rosario MD In 2 days  [address] [facility] [id number] [phone]          Return to ED if worse

## 2017-12-16 NOTE — DISCHARGE INSTRUCTIONS
Swimmer's Ear: Care Instructions  Your Care Instructions    Swimmer's ear (otitis externa) is inflammation or infection of the ear canal. This is the passage that leads from the outer ear to the eardrum. Any water, sand, or other debris that gets into the ear canal and stays there can cause swimmer's ear. Putting cotton swabs or other items in the ear to clean it can also cause this problem. Swimmer's ear can be very painful. But you can treat the pain and infection with medicines. You should feel better in a few days. Follow-up care is a key part of your treatment and safety. Be sure to make and go to all appointments, and call your doctor if you are having problems. It's also a good idea to know your test results and keep a list of the medicines you take. How can you care for yourself at home? Cleaning and care  · Use antibiotic drops as your doctor directs. · Do not insert ear drops (other than the antibiotic ear drops) or anything else into the ear unless your doctor has told you to. · Avoid getting water in the ear until the problem clears up. Use cotton lightly coated with petroleum jelly as an earplug. Do not use plastic earplugs. · Use a hair dryer set on low to carefully dry the ear after you shower. · To ease ear pain, hold a warm washcloth against your ear. · Take pain medicines exactly as directed. ¨ If the doctor gave you a prescription medicine for pain, take it as prescribed. ¨ If you are not taking a prescription pain medicine, ask your doctor if you can take an over-the-counter medicine. Inserting ear drops  · Warm the drops to body temperature by rolling the container in your hands. Or you can place it in a cup of warm water for a few minutes. · Lie down, with your ear facing up. · Place drops inside the ear. Follow your doctor's instructions (or the directions on the label) for how many drops to use.  Gently wiggle the outer ear or pull the ear up and back to help the drops get into the ear. · It's important to keep the liquid in the ear canal for 3 to 5 minutes. When should you call for help? Call your doctor now or seek immediate medical care if:  ? · You have a new or higher fever. ? · You have new or worse pain, swelling, warmth, or redness around or behind your ear. ? · You have new or increasing pus or blood draining from your ear. ? Watch closely for changes in your health, and be sure to contact your doctor if:  ? · You are not getting better after 2 days (48 hours). Where can you learn more? Go to http://estefania-gian.info/. Enter C706 in the search box to learn more about \"Swimmer's Ear: Care Instructions. \"  Current as of: May 12, 2017  Content Version: 11.4  © 7021-2798 "[employer]. Care instructions adapted under license by [employer] (which disclaims liability or warranty for this information). If you have questions about a medical condition or this instruction, always ask your healthcare professional. [place] [address] any warranty or liability for your use of this information. Paronychia: Care Instructions  Your Care Instructions  Paronychia (say \"uhwk-nv-UD-rajan-uh\") is an infection of the skin around a fingernail or toenail. It happens when germs enter through a break in the skin. The doctor may have made a small cut in the infected area to drain the pus. Most cases of paronychia improve in a few days. But watch your symptoms and follow your doctor's advice. Though rare, a mild case can turn into something more serious and infect your entire finger or toe. Also, it is possible for an infection to return. Follow-up care is a key part of your treatment and safety. Be sure to make and go to all appointments, and call your doctor if you are having problems. It's also a good idea to know your test results and keep a list of the medicines you take. How can you care for yourself at home?   · If your doctor told you how to care for your infected nail, follow the doctor's instructions. If you did not get instructions, follow this general advice:  ¨ Wash the area with clean water 2 times a day. Don't use hydrogen peroxide or alcohol, which can slow healing. ¨ You may cover the area with a thin layer of petroleum jelly, such as Vaseline, and a nonstick bandage. ¨ Apply more petroleum jelly and replace the bandage as needed. · If your doctor prescribed antibiotics, take them as directed. Do not stop taking them just because you feel better. You need to take the full course of antibiotics. · Take an over-the-counter pain medicine, such as acetaminophen (Tylenol), ibuprofen (Advil, Motrin), or naproxen (Aleve). Read and follow all instructions on the label. · Do not take two or more pain medicines at the same time unless the doctor told you to. Many pain medicines have acetaminophen, which is Tylenol. Too much acetaminophen (Tylenol) can be harmful. · Prop up the toe or finger so that it is higher than the level of your heart. This will help with pain and swelling. · Apply heat. Put a warm water bottle, heating pad set on low, or warm cloth on your finger or toe. Do not go to sleep with a heating pad on your skin. · Soak the area in warm water twice a day for 15 minutes each time. After soaking, dry the area well and apply a thin layer of petroleum jelly, such as Vaseline. Put on a new bandage. When should you call for help? Call your doctor now or seek immediate medical care if:  ? · You have signs of new or worsening infection, such as:  ¨ Increased pain, swelling, warmth, or redness. ¨ Red streaks leading from the infected skin. ¨ Pus draining from the area. ¨ A fever. ? Watch closely for changes in your health, and be sure to contact your doctor if:  ? · You do not get better as expected. Where can you learn more? Go to http://dominic.info/.   Enter C435 in the search box to learn more about \"Paronychia: Care Instructions. \"  Current as of: October 13, 2016  Content Version: 11.4  © 3333-4717 [employer]. Care instructions adapted under license by [employer] (which disclaims liability or warranty for this information). If you have questions about a medical condition or this instruction, always ask your healthcare professional. [address] any warranty or liability for your use of this information.

## 2018-05-17 ENCOUNTER — HOSPITAL ENCOUNTER (EMERGENCY)
Age: 43
Discharge: HOME | End: 2018-05-17
Attending: EMERGENCY MEDICINE | Admitting: EMERGENCY MEDICINE
Payer: MEDICAID

## 2018-05-17 VITALS
DIASTOLIC BLOOD PRESSURE: 101 MMHG | TEMPERATURE: 97.3 F | BODY MASS INDEX: 46.01 KG/M2 | RESPIRATION RATE: 18 BRPM | SYSTOLIC BLOOD PRESSURE: 170 MMHG | HEART RATE: 98 BPM | HEIGHT: 62 IN | WEIGHT: 250 LBS | OXYGEN SATURATION: 98 %

## 2018-05-17 DIAGNOSIS — J45.901 ASTHMA WITH ACUTE EXACERBATION, UNSPECIFIED ASTHMA SEVERITY, UNSPECIFIED WHETHER PERSISTENT: Primary | ICD-10-CM

## 2018-05-17 PROCEDURE — 74011636637 HC RX REV CODE- 636/637: Performed by: PHYSICIAN ASSISTANT

## 2018-05-17 PROCEDURE — 99283 EMERGENCY DEPT VISIT LOW MDM: CPT

## 2018-05-17 RX ORDER — PREDNISONE 20 MG/1
60 TABLET ORAL
Status: COMPLETED | OUTPATIENT
Start: 2018-05-17 | End: 2018-05-17

## 2018-05-17 RX ORDER — ALBUTEROL SULFATE 90 UG/1
1-2 AEROSOL, METERED RESPIRATORY (INHALATION)
Qty: 1 INHALER | Refills: 0 | Status: ON HOLD | OUTPATIENT
Start: 2018-05-17 | End: 2018-06-06 | Stop reason: SDUPTHER

## 2018-05-17 RX ORDER — PREDNISONE 10 MG/1
TABLET ORAL
Qty: 21 TAB | Refills: 0 | Status: SHIPPED | OUTPATIENT
Start: 2018-05-17 | End: 2018-05-23

## 2018-05-17 RX ADMIN — PREDNISONE 60 MG: 20 TABLET ORAL at 08:17

## 2018-05-17 NOTE — ED NOTES
PA has reviewed discharge instructions with the patient. The patient verbalized understanding. Patient discharged to waiting room ambulatory to wait for medicare cab.

## 2018-05-17 NOTE — DISCHARGE INSTRUCTIONS
Asthma Attack: Care Instructions  Your Care Instructions    During an asthma attack, the airways swell and narrow. This makes it hard to breathe. Severe asthma attacks can be life-threatening, but you can help prevent them by keeping your asthma under control and treating symptoms before they get bad. Symptoms include being short of breath, having chest tightness, coughing, and wheezing. Noting and treating these symptoms can also help you avoid future trips to the emergency room. The doctor has checked you carefully, but problems can develop later. If you notice any problems or new symptoms, get medical treatment right away. Follow-up care is a key part of your treatment and safety. Be sure to make and go to all appointments, and call your doctor if you are having problems. It's also a good idea to know your test results and keep a list of the medicines you take. How can you care for yourself at home? · Follow your asthma action plan to prevent and treat attacks. If you don't have an asthma action plan, work with your doctor to create one. · Take your asthma medicines exactly as prescribed. Talk to your doctor right away if you have any questions about how to take them. ¨ Use your quick-relief medicine when you have symptoms of an attack. Quick-relief medicine is usually an albuterol inhaler. Some people need to use quick-relief medicine before they exercise. ¨ Take your controller medicine every day, not just when you have symptoms. Controller medicine is usually an inhaled corticosteroid. The goal is to prevent problems before they occur. Don't use your controller medicine to treat an attack that has already started. It doesn't work fast enough to help. ¨ If your doctor prescribed corticosteroid pills to use during an attack, take them exactly as prescribed. It may take hours for the pills to work, but they may make the episode shorter and help you breathe better.   ¨ Keep your quick-relief medicine with you at all times. · Talk to your doctor before using other medicines. Some medicines, such as aspirin, can cause asthma attacks in some people. · If you have a peak flow meter, use it to check how well you are breathing. This can help you predict when an asthma attack is going to occur. Then you can take medicine to prevent the asthma attack or make it less severe. · Do not smoke or allow others to smoke around you. Avoid smoky places. Smoking makes asthma worse. If you need help quitting, talk to your doctor about stop-smoking programs and medicines. These can increase your chances of quitting for good. · Learn what triggers an asthma attack for you, and avoid the triggers when you can. Common triggers include colds, smoke, air pollution, dust, pollen, mold, pets, cockroaches, stress, and cold air. · Avoid colds and the flu. Get a pneumococcal vaccine shot. If you have had one before, ask your doctor if you need a second dose. Get a flu vaccine every fall. If you must be around people with colds or the flu, wash your hands often. When should you call for help? Call 911 anytime you think you may need emergency care. For example, call if:  ? · You have severe trouble breathing. ?Call your doctor now or seek immediate medical care if:  ? · Your symptoms do not get better after you have followed your asthma action plan. ? · You have new or worse trouble breathing. ? · Your coughing and wheezing get worse. ? · You cough up dark brown or bloody mucus (sputum). ? · You have a new or higher fever. ? Watch closely for changes in your health, and be sure to contact your doctor if:  ? · You need to use quick-relief medicine on more than 2 days a week (unless it is just for exercise). ? · You cough more deeply or more often, especially if you notice more mucus or a change in the color of your mucus. ? · You are not getting better as expected. Where can you learn more?   Go to http://estefania-gian.info/. Enter P592 in the search box to learn more about \"Asthma Attack: Care Instructions. \"  Current as of: May 12, 2017  Content Version: 11.4  © 8343-6422 Healthwise, [employer]. Care instructions adapted under license by [employer] (which disclaims liability or warranty for this information). If you have questions about a medical condition or this instruction, always ask your healthcare professional. [place] [address] any warranty or liability for your use of this information.

## 2018-05-17 NOTE — ED PROVIDER NOTES
EMERGENCY DEPARTMENT HISTORY AND PHYSICAL EXAM      Date: 5/17/2018  Patient Name: Sree Lobato    History of Presenting Illness     Chief Complaint   Patient presents with    Cough     arrives via ems with very mild wheezing, hx of asthma, ems gave 1 duoneb PTA. pt has not refilled her neb rx       History Provided By: Patient and EMS    HPI: Sree Lobato, 38 y.o. female with PMHx significant for Asthma, HTN, and DM, presents by EMS to the ED with cc of cough since 7:00 AM this morning. Pt endorses associated SOB and wheezing. Pt notes that she was given a nebulizer treatment while en route to the ED which helped to alleviate her wheezing, but her at home steroid inhaler has been unsuccessful in treating her symptoms. Pt notes that she has a Hx of similar symptoms which was the result of Asthma exacerbation. Pt explains that she awoke this morning with worsening respiratory sxs that are consistent with past Asthma flares. Pt states that she has a steroid inhaler at home, but she has ran out her albuterol medication and she lost her home nebulizer. Pt reports that she is currently on HTN medication daily. Pt denies any social Hx of tobacco or illicit drug use, but she endorses daily EtOH use (48 ounces). Pt specifically denies fever, rhinorrhea, sore throat, ear pain, abdominal pain, or urinary sxs. There are no other complaints, changes, or physical findings at this time. PCP: Ying Trevino MD    Current Outpatient Prescriptions   Medication Sig Dispense Refill    predniSONE (STERAPRED DS) 10 mg dose pack Standard 6 day taper 21 Tab 0    albuterol (PROAIR HFA) 90 mcg/actuation inhaler Take 1-2 Puffs by inhalation every four (4) hours as needed for Wheezing. 1 Inhaler 0    neomycin-polymyxin-hydrocortisone (CORTISPORIN) otic solution 3-4 Drops by Otic route four (4) times daily.  10 mL 0    albuterol (PROVENTIL, VENTOLIN) 90 mcg/actuation inhaler Take 1-2 Puffs by inhalation every four (4) hours as needed for Wheezing or Shortness of Breath. 17 g 0    fluticasone-salmeterol (ADVAIR DISKUS) 250-50 mcg/dose diskus inhaler Take 1 Puff by inhalation two (2) times a day. Past History     Past Medical History:  Past Medical History:   Diagnosis Date    Asthma     Diabetes ([address]     Hypertension     Psychiatric disorder     depression    Sleep apnea        Past Surgical History:  Past Surgical History:   Procedure Laterality Date    HX TUBAL LIGATION         Family History:  Family History   Problem Relation Age of Onset    Alcohol abuse Mother     Alcohol abuse Father        Social History:  Social History   Substance Use Topics    Smoking status: Never Smoker    Smokeless tobacco: Never Used    Alcohol use Yes      Comment: 2 or 3 40's a day 7/18/17       Allergies: Allergies   Allergen Reactions    Aspirin Rash     Patient denies allergy, sometimes itches    Lisinopril Swelling         Review of Systems   Review of Systems   Constitutional: Negative for fever. HENT: Negative for ear pain, rhinorrhea and sore throat. Respiratory: Positive for cough, shortness of breath and wheezing. Gastrointestinal: Negative for abdominal pain. Genitourinary: Negative for difficulty urinating, dysuria and frequency. All other systems reviewed and are negative. Physical Exam   Physical Exam   Constitutional: She is oriented to person, place, and time. She appears well-developed and well-nourished. No distress. Obese 37 yo -American female   HENT:   Head: Normocephalic and atraumatic. Right Ear: Tympanic membrane, external ear and ear canal normal. No middle ear effusion. Left Ear: Tympanic membrane, external ear and ear canal normal.  No middle ear effusion. Nose: Nose normal. No rhinorrhea. Right sinus exhibits no maxillary sinus tenderness and no frontal sinus tenderness. Left sinus exhibits no maxillary sinus tenderness and no frontal sinus tenderness. Mouth/Throat: Uvula is midline, oropharynx is clear and moist and mucous membranes are normal. No oropharyngeal exudate, posterior oropharyngeal edema or posterior oropharyngeal erythema. Eyes: Conjunctivae are normal. Right eye exhibits no discharge. Left eye exhibits no discharge. Neck: Normal range of motion. Neck supple. Cardiovascular: Normal rate, regular rhythm and normal heart sounds. No murmur heard. Pulmonary/Chest: Effort normal and breath sounds normal. No respiratory distress. She has no wheezes. Speaking clearly in complete sentences. Neurological: She is alert and oriented to person, place, and time. Skin: Skin is warm and dry. She is not diaphoretic. Psychiatric: She has a normal mood and affect. Her behavior is normal.   Nursing note and vitals reviewed. Medical Decision Making   I am the first provider for this patient. I reviewed the vital signs, available nursing notes, past medical history, past surgical history, family history and social history. Vital Signs-Reviewed the patient's vital signs. Patient Vitals for the past 12 hrs:   Temp Pulse Resp BP SpO2   05/17/18 0754 97.3 °F (36.3 °C) 98 18 (!) 170/101 98 %       Pulse Oximetry Analysis - 98% on RA    Cardiac Monitor:   Rate: 97 bpm  Rhythm: Normal Sinus Rhythm      Records Reviewed: Nursing Notes, Old Medical Records, Previous Radiology Studies and Previous Laboratory Studies    Provider Notes (Medical Decision Making):   DDx: bronchitis, PNA, asthma exacerbation, seasonal allergies, URI    ED Course:   Initial assessment performed. The patients presenting problems have been discussed, and they are in agreement with the care plan formulated and outlined with them. I have encouraged them to ask questions as they arise throughout their visit. Critical Care Time:   0    Disposition:  8:14 AM  The patient has been re-evaluated and is ready for discharge. Reviewed available results with patient.  Counseled patient on diagnosis and care plan. Patient has expressed understanding, and all questions have been answered. Patient agrees with plan and agrees to follow up as recommended, or return to the ED if their symptoms worsen. Discharge instructions have been provided and explained to the patient, along with reasons to return to the ED. PLAN:  1. Current Discharge Medication List      START taking these medications    Details   predniSONE (STERAPRED DS) 10 mg dose pack Standard 6 day taper  Qty: 21 Tab, Refills: 0      albuterol (PROAIR HFA) 90 mcg/actuation inhaler Take 1-2 Puffs by inhalation every four (4) hours as needed for Wheezing. Qty: 1 Inhaler, Refills: 0         CONTINUE these medications which have NOT CHANGED    Details   neomycin-polymyxin-hydrocortisone (CORTISPORIN) otic solution 3-4 Drops by Otic route four (4) times daily. Qty: 10 mL, Refills: 0      albuterol (PROVENTIL, VENTOLIN) 90 mcg/actuation inhaler Take 1-2 Puffs by inhalation every four (4) hours as needed for Wheezing or Shortness of Breath. Qty: 17 g, Refills: 0      fluticasone-salmeterol (ADVAIR DISKUS) 250-50 mcg/dose diskus inhaler Take 1 Puff by inhalation two (2) times a day. 2.   Follow-up Information     Follow up With Details Comments Contact Delroy Sharp MD In 1 week  [facility] [place] [address]  [phone]          Return to ED if worse     Diagnosis     Clinical Impression:   1. Asthma with acute exacerbation, unspecified asthma severity, unspecified whether persistent        Attestations: This note is prepared by Aisha Camejo, acting as Scribe for Desean Philip PA-C. Desean Philip PA-C: The scribe's documentation has been prepared under my direction and personally reviewed by me in its entirety. I confirm that the note above accurately reflects all work, treatment, procedures, and medical decision making performed by me.

## 2018-06-05 ENCOUNTER — APPOINTMENT (OUTPATIENT)
Dept: GENERAL RADIOLOGY | Age: 43
End: 2018-06-05
Attending: EMERGENCY MEDICINE
Payer: MEDICAID

## 2018-06-05 ENCOUNTER — HOSPITAL ENCOUNTER (INPATIENT)
Age: 43
LOS: 4 days | Discharge: HOME | End: 2018-06-10
Attending: EMERGENCY MEDICINE | Admitting: INTERNAL MEDICINE
Payer: MEDICAID

## 2018-06-05 DIAGNOSIS — F10.939 ALCOHOL WITHDRAWAL SYNDROME WITH COMPLICATION (HCC): Primary | ICD-10-CM

## 2018-06-05 DIAGNOSIS — I10 HYPERTENSION, UNSPECIFIED TYPE: ICD-10-CM

## 2018-06-05 LAB
ALBUMIN SERPL-MCNC: 3.9 G/DL (ref 3.5–5)
ALBUMIN/GLOB SERPL: 0.9 {RATIO} (ref 1.1–2.2)
ALP SERPL-CCNC: 67 U/L (ref 45–117)
ALT SERPL-CCNC: 52 U/L (ref 12–78)
AMPHET UR QL SCN: NEGATIVE
ANION GAP SERPL CALC-SCNC: 10 MMOL/L (ref 5–15)
APPEARANCE UR: ABNORMAL
AST SERPL-CCNC: 97 U/L (ref 15–37)
BACTERIA URNS QL MICRO: ABNORMAL /HPF
BARBITURATES UR QL SCN: NEGATIVE
BASOPHILS # BLD: 0 K/UL (ref 0–0.1)
BASOPHILS NFR BLD: 0 % (ref 0–1)
BENZODIAZ UR QL: NEGATIVE
BILIRUB SERPL-MCNC: 2.1 MG/DL (ref 0.2–1)
BILIRUB UR QL CFM: NEGATIVE
BUN SERPL-MCNC: 5 MG/DL (ref 6–20)
BUN/CREAT SERPL: 6 (ref 12–20)
CALCIUM SERPL-MCNC: 9.2 MG/DL (ref 8.5–10.1)
CANNABINOIDS UR QL SCN: NEGATIVE
CHLORIDE SERPL-SCNC: 101 MMOL/L (ref 97–108)
CO2 SERPL-SCNC: 25 MMOL/L (ref 21–32)
COCAINE UR QL SCN: POSITIVE
COLOR UR: ABNORMAL
CREAT SERPL-MCNC: 0.88 MG/DL (ref 0.55–1.02)
DIFFERENTIAL METHOD BLD: ABNORMAL
DRUG SCRN COMMENT,DRGCM: ABNORMAL
EOSINOPHIL # BLD: 0 K/UL (ref 0–0.4)
EOSINOPHIL NFR BLD: 1 % (ref 0–7)
EPITH CASTS URNS QL MICRO: ABNORMAL /LPF
ERYTHROCYTE [DISTWIDTH] IN BLOOD BY AUTOMATED COUNT: 24.7 % (ref 11.5–14.5)
ETHANOL SERPL-MCNC: <10 MG/DL
GLOBULIN SER CALC-MCNC: 4.5 G/DL (ref 2–4)
GLUCOSE SERPL-MCNC: 127 MG/DL (ref 65–100)
GLUCOSE UR STRIP.AUTO-MCNC: NEGATIVE MG/DL
HCT VFR BLD AUTO: 28 % (ref 35–47)
HGB BLD-MCNC: 7.7 G/DL (ref 11.5–16)
HGB UR QL STRIP: NEGATIVE
IMM GRANULOCYTES # BLD: 0.1 K/UL (ref 0–0.04)
IMM GRANULOCYTES NFR BLD AUTO: 2 % (ref 0–0.5)
INR PPP: 1.2 (ref 0.9–1.1)
KETONES UR QL STRIP.AUTO: NEGATIVE MG/DL
LEUKOCYTE ESTERASE UR QL STRIP.AUTO: ABNORMAL
LYMPHOCYTES # BLD: 0.8 K/UL (ref 0.8–3.5)
LYMPHOCYTES NFR BLD: 18 % (ref 12–49)
MAGNESIUM SERPL-MCNC: 1.4 MG/DL (ref 1.6–2.4)
MCH RBC QN AUTO: 18.3 PG (ref 26–34)
MCHC RBC AUTO-ENTMCNC: 27.5 G/DL (ref 30–36.5)
MCV RBC AUTO: 66.5 FL (ref 80–99)
METHADONE UR QL: NEGATIVE
MONOCYTES # BLD: 0.3 K/UL (ref 0–1)
MONOCYTES NFR BLD: 8 % (ref 5–13)
NEUTS SEG # BLD: 3 K/UL (ref 1.8–8)
NEUTS SEG NFR BLD: 71 % (ref 32–75)
NITRITE UR QL STRIP.AUTO: POSITIVE
NRBC # BLD: 0.04 K/UL (ref 0–0.01)
NRBC BLD-RTO: 0.9 PER 100 WBC
OPIATES UR QL: POSITIVE
PCP UR QL: NEGATIVE
PH UR STRIP: 7 [PH] (ref 5–8)
PLATELET # BLD AUTO: 89 K/UL (ref 150–400)
PMV BLD AUTO: ABNORMAL FL (ref 8.9–12.9)
POTASSIUM SERPL-SCNC: 3.9 MMOL/L (ref 3.5–5.1)
PROT SERPL-MCNC: 8.4 G/DL (ref 6.4–8.2)
PROT UR STRIP-MCNC: ABNORMAL MG/DL
PROTHROMBIN TIME: 11.9 SEC (ref 9–11.1)
RBC # BLD AUTO: 4.21 M/UL (ref 3.8–5.2)
RBC #/AREA URNS HPF: ABNORMAL /HPF (ref 0–5)
RBC MORPH BLD: ABNORMAL
SODIUM SERPL-SCNC: 136 MMOL/L (ref 136–145)
SP GR UR REFRACTOMETRY: 1.02 (ref 1–1.03)
TROPONIN I SERPL-MCNC: <0.04 NG/ML
UROBILINOGEN UR QL STRIP.AUTO: 4 EU/DL (ref 0.2–1)
WBC # BLD AUTO: 4.2 K/UL (ref 3.6–11)
WBC URNS QL MICRO: ABNORMAL /HPF (ref 0–4)

## 2018-06-05 PROCEDURE — 74011250636 HC RX REV CODE- 250/636: Performed by: EMERGENCY MEDICINE

## 2018-06-05 PROCEDURE — 84484 ASSAY OF TROPONIN QUANT: CPT | Performed by: EMERGENCY MEDICINE

## 2018-06-05 PROCEDURE — 80053 COMPREHEN METABOLIC PANEL: CPT | Performed by: EMERGENCY MEDICINE

## 2018-06-05 PROCEDURE — 85025 COMPLETE CBC W/AUTO DIFF WBC: CPT | Performed by: EMERGENCY MEDICINE

## 2018-06-05 PROCEDURE — 74011000258 HC RX REV CODE- 258: Performed by: EMERGENCY MEDICINE

## 2018-06-05 PROCEDURE — 96376 TX/PRO/DX INJ SAME DRUG ADON: CPT

## 2018-06-05 PROCEDURE — 96367 TX/PROPH/DG ADDL SEQ IV INF: CPT

## 2018-06-05 PROCEDURE — 80307 DRUG TEST PRSMV CHEM ANLYZR: CPT | Performed by: EMERGENCY MEDICINE

## 2018-06-05 PROCEDURE — 81001 URINALYSIS AUTO W/SCOPE: CPT | Performed by: EMERGENCY MEDICINE

## 2018-06-05 PROCEDURE — 96375 TX/PRO/DX INJ NEW DRUG ADDON: CPT

## 2018-06-05 PROCEDURE — 83735 ASSAY OF MAGNESIUM: CPT | Performed by: EMERGENCY MEDICINE

## 2018-06-05 PROCEDURE — 85610 PROTHROMBIN TIME: CPT | Performed by: EMERGENCY MEDICINE

## 2018-06-05 PROCEDURE — 71045 X-RAY EXAM CHEST 1 VIEW: CPT

## 2018-06-05 PROCEDURE — 96368 THER/DIAG CONCURRENT INF: CPT

## 2018-06-05 PROCEDURE — 74011000250 HC RX REV CODE- 250: Performed by: EMERGENCY MEDICINE

## 2018-06-05 PROCEDURE — 36415 COLL VENOUS BLD VENIPUNCTURE: CPT | Performed by: EMERGENCY MEDICINE

## 2018-06-05 PROCEDURE — 96365 THER/PROPH/DIAG IV INF INIT: CPT

## 2018-06-05 PROCEDURE — 99285 EMERGENCY DEPT VISIT HI MDM: CPT

## 2018-06-05 PROCEDURE — 93005 ELECTROCARDIOGRAM TRACING: CPT

## 2018-06-05 PROCEDURE — 96361 HYDRATE IV INFUSION ADD-ON: CPT

## 2018-06-05 RX ORDER — LORAZEPAM 2 MG/ML
1 INJECTION INTRAMUSCULAR
Status: COMPLETED | OUTPATIENT
Start: 2018-06-05 | End: 2018-06-05

## 2018-06-05 RX ORDER — SODIUM CHLORIDE 0.9 % (FLUSH) 0.9 %
5-10 SYRINGE (ML) INJECTION EVERY 8 HOURS
Status: DISCONTINUED | OUTPATIENT
Start: 2018-06-05 | End: 2018-06-10 | Stop reason: HOSPADM

## 2018-06-05 RX ORDER — SODIUM CHLORIDE 0.9 % (FLUSH) 0.9 %
5-10 SYRINGE (ML) INJECTION AS NEEDED
Status: DISCONTINUED | OUTPATIENT
Start: 2018-06-05 | End: 2018-06-10 | Stop reason: HOSPADM

## 2018-06-05 RX ORDER — MAGNESIUM SULFATE 1 G/100ML
1 INJECTION INTRAVENOUS
Status: COMPLETED | OUTPATIENT
Start: 2018-06-05 | End: 2018-06-05

## 2018-06-05 RX ADMIN — LORAZEPAM 1 MG: 2 INJECTION INTRAMUSCULAR; INTRAVENOUS at 19:44

## 2018-06-05 RX ADMIN — LORAZEPAM 1 MG: 2 INJECTION INTRAMUSCULAR; INTRAVENOUS at 22:13

## 2018-06-05 RX ADMIN — FOLIC ACID: 5 INJECTION, SOLUTION INTRAMUSCULAR; INTRAVENOUS; SUBCUTANEOUS at 22:55

## 2018-06-05 RX ADMIN — Medication 10 ML: at 17:05

## 2018-06-05 RX ADMIN — CEFTRIAXONE SODIUM 1 G: 1 INJECTION, POWDER, FOR SOLUTION INTRAMUSCULAR; INTRAVENOUS at 21:15

## 2018-06-05 RX ADMIN — SODIUM CHLORIDE 1000 ML: 900 INJECTION, SOLUTION INTRAVENOUS at 17:05

## 2018-06-05 RX ADMIN — MAGNESIUM SULFATE HEPTAHYDRATE 1 G: 1 INJECTION, SOLUTION INTRAVENOUS at 22:20

## 2018-06-05 NOTE — ED NOTES
Pt medicated per MAR. Pt. Resting comfortably in bed, denies needs at this time. Bed locked and low, call bell in reach.

## 2018-06-05 NOTE — ED NOTES
Pt medicated per MAR. PT anxious and restless. Pt encouraged to stay in bed. Friend at bedside. Call bell within reach.

## 2018-06-05 NOTE — IP AVS SNAPSHOT
[address] [facility] 
[phone] Patient: Richard Santo MRN: [id number] :1975 About your hospitalization You were admitted on:  2018 You last received care in the:  [facility] 2 CARDIOPULMONARY CARE You were discharged on:  Lynn 10, 2018 Why you were hospitalized Your primary diagnosis was:  Not on File Your diagnoses also included:  Alcohol Withdrawal (Hcc), Cocaine Abuse, Polysubstance Dependence (Hcc), Htn (Hypertension), Type Ii Diabetes Mellitus (Hcc), Asthma, Anemia Follow-up Information Follow up With Details Comments Contact Info Amaris Méndez MD Schedule an appointment as soon as possible for a visit in 3 days hospital follow-up [address] [place] [id number] 
[phone] Taylor Zepeda MD In 2 weeks  [address] [facility] 
[phone] [address] [facility] [address] of [address]  Please call if you need assistance with somewhere to stay. Maximiliano [address] [place] [address] 
[phone] Discharge Orders None A check steve indicates which time of day the medication should be taken. My Medications START taking these medications Instructions Each Dose to Equal  
 Morning Noon Evening Bedtime  
 cloNIDine HCl 0.3 mg tablet Commonly known as:  CATAPRES Your last dose was: Your next dose is: Take 1 Tab by mouth two (2) times a day for 30 days. 0.3 mg  
    
   
   
   
  
 ferrous sulfate 325 mg (65 mg iron) tablet Your last dose was: Your next dose is: Take 1 Tab by mouth two (2) times daily (with meals) for 30 days. 325 mg  
    
   
   
   
  
 pantoprazole 40 mg tablet Commonly known as:  PROTONIX Start taking on:  2018 Your last dose was: Your next dose is: Take 1 Tab by mouth Daily (before breakfast) for 30 days. 40 mg Thiamine Mononitrate 100 mg tablet Commonly known as:  B-1 Start taking on:  6/11/2018 Your last dose was: Your next dose is: Take 1 Tab by mouth daily for 30 days. 100 mg CHANGE how you take these medications Instructions Each Dose to Equal  
 Morning Noon Evening Bedtime  
 verapamil  mg ER capsule Commonly known as:  Kai Driscoll What changed:  Another medication with the same name was removed. Continue taking this medication, and follow the directions you see here. Your last dose was: Your next dose is: Take 120 mg by mouth daily. 120 mg CONTINUE taking these medications Instructions Each Dose to Equal  
 Morning Noon Evening Bedtime ADVAIR DISKUS 250-50 mcg/dose diskus inhaler Generic drug:  fluticasone-salmeterol Your last dose was: Your next dose is: Take 1 Puff by inhalation two (2) times a day. 1 Puff  
    
   
   
   
  
 albuterol 90 mcg/actuation inhaler Commonly known as:  Jolene Patterson Your last dose was: Your next dose is: Take 1-2 Puffs by inhalation every four (4) hours as needed for Wheezing or Shortness of Breath. 1-2 Puff  
    
   
   
   
  
 cetirizine 10 mg tablet Commonly known as:  ZYRTEC Your last dose was: Your next dose is: Take 10 mg by mouth daily as needed for Allergies. 10 mg  
    
   
   
   
  
 hydrOXYzine pamoate 25 mg capsule Commonly known as:  VISTARIL Your last dose was: Your next dose is: Take 25 mg by mouth three (3) times daily as needed for Itching. 25 mg  
    
   
   
   
  
 ketotifen 0.025 % (0.035 %) ophthalmic solution Commonly known as:  ZADITOR Your last dose was: Your next dose is: Administer 1 Drop to both eyes two (2) times daily as needed (ALLERGIES). 1 Drop OLANZapine 10 mg tablet Commonly known as:  ZyPREXA Your last dose was: Your next dose is: Take 10 mg by mouth nightly. 10 mg  
    
   
   
   
  
  
STOP taking these medications   
 albuterol 90 mcg/actuation inhaler Commonly known as:  PROAIR HFA Where to Get Your Medications These medications were sent to [address] [address] Phone:  [phone] Thiamine Mononitrate 100 mg tablet Information on where to get these meds will be given to you by the nurse or doctor. ! Ask your nurse or doctor about these medications  
  cloNIDine HCl 0.3 mg tablet  
 ferrous sulfate 325 mg (65 mg iron) tablet  
 pantoprazole 40 mg tablet Discharge Instructions HOSPITALIST DISCHARGE INSTRUCTIONS 
 
NAME: Giovany Snider :  1975 MRN:  [id number] Date/Time:  6/10/2018 10:55 AM 
 
ADMIT DATE: 2018 DISCHARGE DATE: 6/10/2018 · It is important that you take the medication exactly as they are prescribed. · Keep your medication in the bottles provided by the pharmacist and keep a list of the medication names, dosages, and times to be taken in your wallet. · Do not take other medications without consulting your doctor. What to do at [facility] Recommended diet:  Diabetic Diet Recommended activity: Activity as tolerated and PT/OT per Home Health If you have questions regarding the hospital related prescriptions or hospital related issues please call [facility] at [phone].  You can always direct your questions to your primary care doctor if you are unable to reach your hospital physician; your PCP works as an extension of your hospital doctor just like your hospital doctor is an extension of your PCP for your time at the hospital [facility], [facility]) If you experience any of the following symptoms then please call your primary care physician or return to the emergency room if you cannot get hold of your doctor: 
 
Fever, chills, nausea, vomiting, or persistent diarrhea Worsening weakness or new problems with your speech or balance Dark stools or visible blood in your stools New Leg swelling or shortness of breath as these could be signs of a clot Additional Instructions: F/u with pcp next week and get referral to ob/gyn 
F/u with gi in 2 weeks No Asprin/NSAIDS No alcohol, no cocaine Call md or go to the ER if blood in stool, shortness of breath, chest pain Bring these papers with you to your follow up appointments. The papers will help your doctors be sure to continue the care plan from the hospital. 
 
 
 
 
 
 
Information obtained by : 
I understand that if any problems occur once I am at home I am to contact my physician. I understand and acknowledge receipt of the instructions indicated above. Physician's or R.N.'s Signature                                                                  Date/Time Patient or Representative Signature Introducing [facility]! Pawel Sales introduces Yappn patient portal. Now you can access parts of your medical record, email your doctor's office, and request medication refills online. 1. In your internet browser, go to [email or web address] 2. Click on the First Time User? Click Here link in the Sign In box.  You will see the New Member Sign Up page. 3. Enter your Langtice Access Code exactly as it appears below. You will not need to use this code after youve completed the sign-up process. If you do not sign up before the expiration date, you must request a new code. · Langtice Access Code: [email or web address] Expires: 8/15/2018  7:52 AM 
 
4. Enter the last four digits of your Social Security Number (xxxx) and Date of Birth (mm/dd/yyyy) as indicated and click Submit. You will be taken to the next sign-up page. 5. Create a TPI Compositest ID. This will be your Langtice login ID and cannot be changed, so think of one that is secure and easy to remember. 6. Create a TPI Compositest password. You can change your password at any time. 7. Enter your Password Reset Question and Answer. This can be used at a later time if you forget your password. 8. Enter your e-mail address. You will receive e-mail notification when new information is available in [facility] [address] [facility] [address]. 9. Click Sign Up. You can now view and download portions of your medical record. 10. Click the Download Summary menu link to download a portable copy of your medical information. If you have questions, please visit the Frequently Asked Questions section of the Langtice website. Remember, Langtice is NOT to be used for urgent needs. For medical emergencies, dial 911. Now available from your iPhone and Android! Introducing Rg Lehman As a [facility] patient, I wanted to make you aware of our electronic visit tool called Rg Lehman. [facility] 24/7 allows you to connect within minutes with a medical provider 24 hours a day, seven days a week via a mobile device or tablet or logging into a secure website from your computer. You can access Rg Lehman from anywhere in the [place].  
 
A virtual visit might be right for you when you have a simple condition and feel like you just dont want to get out of bed, or cant get away from work for an appointment, when your regular Geeclive Suresh provider is not available (evenings, weekends or holidays), or when youre out of town and need minor care. Electronic visits cost only $49 and if the Gee Demetrice 24/7 provider determines a prescription is needed to treat your condition, one can be electronically transmitted to a nearby pharmacy*. Please take a moment to enroll today if you have not already done so. The enrollment process is free and takes just a few minutes. To enroll, please download the [employer] 24/7 sophia to your tablet or phone, or visit [email or web address] to enroll on your computer. And, as an [address] [place] [address] patient with a BorderJump account, the results of your visits will be scanned into your electronic medical record and your primary care provider will be able to view the scanned results. We urge you to continue to see your regular Gerard Suresh provider for your ongoing medical care. And while your primary care provider may not be the one available when you seek a Rg RentSharewendiefin virtual visit, the peace of mind you get from getting a real diagnosis real time can be priceless. For more information on Photoblog, view our Frequently Asked Questions (FAQs) at [email or web address]. Sincerely, 
 
Zoran Rubalcava MD 
Chief Medical Officer [facility] Marietta Anderson *:  certain medications cannot be prescribed via Photoblog Providers Seen During Your Hospitalization Provider Specialty Primary office phone Liberty Robles MD Emergency Medicine [phone] Wei Vaughn MD Internal Medicine [phone] Tiffany Parker MD Internal Medicine [phone] Araceli Rodriguez MD Internal Medicine [phone] Dutch Grey MD Hospitalist [phone] Your Primary Care Physician (PCP) Primary Care Physician Office Phone Office Fax Jenna ROMERO [phone] You are allergic to the following Allergen Reactions Aspirin Rash Patient denies allergy, sometimes itches Lisinopril Swelling Recent Documentation Height Weight Breastfeeding? BMI OB Status Smoking Status 1.6 m 113.4 kg No 44.29 kg/m2 Having regular periods Never Smoker Emergency Contacts Name Discharge Info Relation Home Work Mobile Martha Villalta DISCHARGE CAREGIVER [3] Spouse [3] [phone] [facility] N/A  AT THIS TIME [6] Other Relative [6] [phone] Patient Belongings The following personal items are in your possession at time of discharge: 
  Dental Appliances: At bedside  Visual Aid: None      Home Medications: None   Jewelry: None  Clothing: None    Other Valuables: Cell Phone Please provide this summary of care documentation to your next provider. Signatures-by signing, you are acknowledging that this After Visit Summary has been reviewed with you and you have received a copy. Patient Signature:  ____________________________________________________________ Date:  ____________________________________________________________  
  
Andre Cart Provider Signature:  ____________________________________________________________ Date:  ____________________________________________________________

## 2018-06-05 NOTE — ED PROVIDER NOTES
EMERGENCY DEPARTMENT HISTORY AND PHYSICAL EXAM      Date: 6/5/2018  Patient Name: Jesi Joe    History of Presenting Illness     Chief Complaint   Patient presents with    Chest Pain     Pt reports mid center chest pain with SOB since last night after consuming alcohol and cocaine    Alcohol Problem     Pt states she last drank last night. Reports she \"has a problem and needs some help to stop drinking\". Pt states she has gone through alcohol withdraw before. History Provided By: Patient and EMS    HPI: Jesi Joe, 38 y.o. female with PMHx significant for asthma, HTN, depression,and DM, presents via EMS to the ED for further evaluation of diaphoresis and tremors after heavy ETOH and crack cocaine use last night. Pt reports associated sx of sharp, sternal chest pain and nausea as well. She expresses she is a 4-5 cans of beer drinker / day noting her last drink to be last night. Pt also discloses crack cocaine use last night as well. She believes her sx are consistent with withdrawal sx leading her to the ED for evaluation. Pt denies any exacerbating or relieving factors to her sx. She specifically denies any fevers, chills, vomiting,  shortness of breath, headache, rash, diarrhea,  or weight loss. There are no other complaints, changes, or physical findings at this time.     PCP: Melissa Pang MD   SHx: (-) Tobacco; (+) ETOH: 2-3 90CT/VALERIO; (+) Illicit drug use: cocaine    Current Facility-Administered Medications   Medication Dose Route Frequency Provider Last Rate Last Dose    cloNIDine HCl (CATAPRES) tablet 0.3 mg  0.3 mg Oral BID Cecilia Kincaid MD   0.3 mg at 06/10/18 0948    magnesium oxide (MAG-OX) tablet 400 mg  400 mg Oral DAILY Cecilia Kincaid MD   400 mg at 06/10/18 0948    LORazepam (ATIVAN) tablet 1 mg  1 mg Oral Q6H PRN Cecilia Kincaid MD   1 mg at 06/09/18 9199    ferrous sulfate tablet 325 mg  1 Tab Oral BID WITH MEALS Devika Durand III, MD   325 mg at 06/10/18 3422    OLANZapine (ZyPREXA) tablet 10 mg  10 mg Oral QPM Phu Boo MD   10 mg at 17/13/17 9599    folic acid (FOLVITE) tablet 1 mg  1 mg Oral DAILY Phu Boo MD   1 mg at 06/10/18 0947    multivitamin, tx-iron-ca-min (THERA-M w/ IRON) tablet 1 Tab  1 Tab Oral DAILY Phu Boo MD   1 Tab at 06/10/18 5756    hydrOXYzine pamoate (VISTARIL) capsule 25 mg  25 mg Oral TID PRN Phu Boo MD        lactobac ac& pc-s.therm-b.anim (MAYCO Q/RISAQUAD)  1 Cap Oral DAILY Phu Boo MD   1 Cap at 06/10/18 0948    Thiamine Mononitrate (B-1) tablet 100 mg  100 mg Oral DAILY Phu Boo MD   100 mg at 06/10/18 0948    pantoprazole (PROTONIX) tablet 40 mg  40 mg Oral ACB Phu Boo MD   40 mg at 06/10/18 0948    levalbuterol (XOPENEX) nebulizer soln 1.25 mg/3 mL  1.25 mg Nebulization Q6H PRN Ivy Chery MD        fluticasone-vilanterol (BREO ELLIPTA) 200mcg-25mcg/puff  1 Puff Inhalation DAILY Ivy Chery MD   1 Puff at 06/10/18 0949    cloNIDine HCl (CATAPRES) tablet 0.2 mg  0.2 mg Oral Q6H PRN Ivy Chery MD   0.2 mg at 06/07/18 2031    insulin lispro (HUMALOG) injection   SubCUTAneous AC&HS Ivy Chery MD   2 Units at 06/10/18 0949    glucose chewable tablet 16 g  4 Tab Oral PRN Ivy Chery MD        dextrose (D50W) injection syrg 12.5-25 g  12.5-25 g IntraVENous PRN Ivy Chery MD        glucagon (GLUCAGEN) injection 1 mg  1 mg IntraMUSCular PRN Ivy Chery MD        0.9% sodium chloride infusion 250 mL  250 mL IntraVENous PRN Giles Jovel MD        0.9% sodium chloride infusion 250 mL  250 mL IntraVENous PRN Jaciel Terrazas III, MD        acetaminophen (TYLENOL) tablet 650 mg  650 mg Oral Q6H PRN Bess Ga MD   650 mg at 06/10/18 1116    verapamil ER (CALAN-SR) tablet 120 mg  120 mg Oral DAILY Bess Ga MD   120 mg at 06/10/18 0947    sodium chloride (NS) flush 5-10 mL  5-10 mL IntraVENous Queen Luis Enrique MD   10 mL at 06/09/18 2050    sodium chloride (NS) flush 5-10 mL  5-10 mL IntraVENous PRN Elisabeth Salgado MD   10 mL at 06/06/18 1411       Past History     Past Medical History:  Past Medical History:   Diagnosis Date    Asthma     Diabetes ([address]     Hypertension     Psychiatric disorder     depression    Sleep apnea        Past Surgical History:  Past Surgical History:   Procedure Laterality Date    HX TUBAL LIGATION         Family History:  Family History   Problem Relation Age of Onset    Alcohol abuse Mother     Alcohol abuse Father        Social History:  Social History   Substance Use Topics    Smoking status: Never Smoker    Smokeless tobacco: Never Used    Alcohol use Yes      Comment: 2 or 3 40's a day 7/18/17       Allergies: Allergies   Allergen Reactions    Aspirin Rash     Patient denies allergy, sometimes itches    Lisinopril Swelling         Review of Systems   Review of Systems   Constitutional: Positive for diaphoresis. Negative for activity change, appetite change, chills, fatigue, fever and unexpected weight change. HENT: Negative. Negative for congestion, ear pain, hearing loss, rhinorrhea, sneezing and voice change. Eyes: Negative. Negative for pain and visual disturbance. Respiratory: Negative. Negative for apnea, cough, choking, chest tightness and shortness of breath. Cardiovascular: Positive for chest pain (sternal ). Negative for palpitations. Gastrointestinal: Positive for nausea. Negative for abdominal distention, abdominal pain, blood in stool, diarrhea and vomiting. Genitourinary: Negative. Negative for difficulty urinating, flank pain, frequency and urgency. No discharge   Musculoskeletal: Negative. Negative for arthralgias, back pain, myalgias and neck stiffness. Skin: Negative. Negative for color change and rash. Neurological: Positive for tremors. Negative for dizziness, seizures, syncope, speech difficulty, weakness, numbness and headaches.    Hematological: Negative for adenopathy. Psychiatric/Behavioral: Negative. Negative for agitation, behavioral problems, dysphoric mood and suicidal ideas. The patient is not nervous/anxious. Physical Exam   Physical Exam   Constitutional: She is oriented to person, place, and time. She appears well-developed and well-nourished. No distress. HENT:   Head: Normocephalic and atraumatic. Mouth/Throat: Mucous membranes are dry. No oropharyngeal exudate. Eyes: Conjunctivae and EOM are normal. Pupils are equal, round, and reactive to light. Right eye exhibits no discharge. Left eye exhibits no discharge. Neck: Normal range of motion. Neck supple. Cardiovascular: Normal rate, regular rhythm and intact distal pulses. Exam reveals no gallop and no friction rub. No murmur heard. Pulmonary/Chest: Effort normal and breath sounds normal. No respiratory distress. She has no wheezes. She has no rales. She exhibits no tenderness. Abdominal: Soft. Bowel sounds are normal. She exhibits no distension and no mass. There is no tenderness. There is no rebound and no guarding. Musculoskeletal: Normal range of motion. She exhibits no edema. Lymphadenopathy:     She has no cervical adenopathy. Neurological: She is alert and oriented to person, place, and time. No cranial nerve deficit. Coordination normal.   Tremulous   Skin: Skin is warm and dry. No rash noted. No erythema. Psychiatric: She has a normal mood and affect. Nursing note and vitals reviewed.       Diagnostic Study Results     Labs -     Recent Results (from the past 12 hour(s))   METABOLIC PANEL, BASIC    Collection Time: 06/10/18  1:45 AM   Result Value Ref Range    Sodium 141 136 - 145 mmol/L    Potassium 4.2 3.5 - 5.1 mmol/L    Chloride 110 (H) 97 - 108 mmol/L    CO2 21 21 - 32 mmol/L    Anion gap 10 5 - 15 mmol/L    Glucose 124 (H) 65 - 100 mg/dL    BUN 8 6 - 20 MG/DL    Creatinine 0.81 0.55 - 1.02 MG/DL    BUN/Creatinine ratio 10 (L) 12 - 20      GFR est AA >60 >60 ml/min/1.73m2    GFR est non-AA >60 >60 ml/min/1.73m2    Calcium 8.7 8.5 - 10.1 MG/DL   MAGNESIUM    Collection Time: 06/10/18  1:45 AM   Result Value Ref Range    Magnesium 1.8 1.6 - 2.4 mg/dL   PHOSPHORUS    Collection Time: 06/10/18  1:45 AM   Result Value Ref Range    Phosphorus 2.6 2.6 - 4.7 MG/DL   HGB & HCT    Collection Time: 06/10/18  1:45 AM   Result Value Ref Range    HGB 8.2 (L) 11.5 - 16.0 g/dL    HCT 30.2 (L) 35.0 - 47.0 %   GLUCOSE, POC    Collection Time: 06/10/18  7:52 AM   Result Value Ref Range    Glucose (POC) 157 (H) 65 - 100 mg/dL    Performed by Ciara Burns (PCT)        Radiologic Studies -   CXR Results  (Last 48 hours)    None            Medical Decision Making   I am the first provider for this patient. I reviewed the vital signs, available nursing notes, past medical history, past surgical history, family history and social history. Vital Signs-Reviewed the patient's vital signs. Patient Vitals for the past 12 hrs:   Temp Pulse Resp BP SpO2   06/10/18 1106 98.2 °F (36.8 °C) 70 18 (!) 152/96 100 %   06/10/18 0747 97.5 °F (36.4 °C) 83 18 148/74 100 %   06/10/18 0244 98.3 °F (36.8 °C) 76 20 104/89 99 %       Pulse Oximetry Analysis - 98% on room air    Cardiac Monitor:   Rate: 98 bpm  Rhythm: Normal Sinus Rhythm      EKG interpretation: (Preliminary) 1538  Rhythm: sinus tachycardia; and regular . Rate (approx.): 111; Axis: normal; IN interval: normal; QRS interval: normal ; ST/T wave: normal; Other findings: normal.      Records Reviewed: Nursing Notes, Old Medical Records, Previous electrocardiograms, Ambulance Run Sheet, Previous Radiology Studies and Previous Laboratory Studies    Provider Notes (Medical Decision Making):     DDx: dehydration, electrolyte abnormality, withdrawal Sx, ACS, arrhythmia    ED Course:   Initial assessment performed. The patients presenting problems have been discussed, and they are in agreement with the care plan formulated and outlined with them.   I have encouraged them to ask questions as they arise throughout their visit. Progress Notes:    9:46 PM   The pt has been re-evaluated. She expresses she feels like she is going through withdrawal and has desires to quit drinking. Will admit to hospitalist for further management. CONSULT NOTE:   9:58 PM  Zay Crowell MD spoke with Dr. Ashley Linda,   Specialty: Hospitalist  Discussed pt's hx, disposition, and available diagnostic and imaging results. Reviewed care plans. Consultant will evaluate pt for admission. Written by Joe Toussaint ED Scribe, as dictated by Miguel Crowell, [address] Time: 0 minutes    Disposition:  Admit Note:  9:58 PM  Patient is being admitted to the hospital by Dr. Lieutenant Rajput. The results of their tests and reasons for their admission have been discussed with the patient and/or available family. They convey their agreement and understanding for the need to be admitted and for their admission diagnosis. Written by Nelli Peacock, OSCAR Scribe, as dictated by Miguel Crowell MD.     PLAN:  1. Admission    Diagnosis     Clinical Impression:   1. Alcohol withdrawal syndrome with complication ([facility] [address]    2. Hypertension, unspecified type        Attestation: This note is prepared by Rojas Peacock, acting as Scribe for Miguel Crowell, [address] [place] [address] Rolan Crowell MD: The scribe's documentation has been prepared under my direction and personally reviewed by me in its entirety. I confirm that the note above accurately reflects all work, treatment, procedures, and medical decision making performed by me.

## 2018-06-05 NOTE — ED NOTES
Pt calling out for assistance with phone. Pt. Resting in bed. Bed locked and low, call bell in reach.

## 2018-06-05 NOTE — IP AVS SNAPSHOT
[address] [facility] 
[phone] Patient: Megan Niño MRN: [id number] :1975 A check steve indicates which time of day the medication should be taken. My Medications START taking these medications Instructions Each Dose to Equal  
 Morning Noon Evening Bedtime  
 cloNIDine HCl 0.3 mg tablet Commonly known as:  CATAPRES Your last dose was: Your next dose is: Take 1 Tab by mouth two (2) times a day for 30 days. 0.3 mg  
    
   
   
   
  
 ferrous sulfate 325 mg (65 mg iron) tablet Your last dose was: Your next dose is: Take 1 Tab by mouth two (2) times daily (with meals) for 30 days. 325 mg  
    
   
   
   
  
 pantoprazole 40 mg tablet Commonly known as:  PROTONIX Start taking on:  2018 Your last dose was: Your next dose is: Take 1 Tab by mouth Daily (before breakfast) for 30 days. 40 mg Thiamine Mononitrate 100 mg tablet Commonly known as:  B-1 Start taking on:  2018 Your last dose was: Your next dose is: Take 1 Tab by mouth daily for 30 days. 100 mg CHANGE how you take these medications Instructions Each Dose to Equal  
 Morning Noon Evening Bedtime  
 verapamil  mg ER capsule Commonly known as:  Shey Damico What changed:  Another medication with the same name was removed. Continue taking this medication, and follow the directions you see here. Your last dose was: Your next dose is: Take 120 mg by mouth daily. 120 mg CONTINUE taking these medications Instructions Each Dose to Equal  
 Morning Noon Evening Bedtime ADVAIR DISKUS 250-50 mcg/dose diskus inhaler Generic drug:  fluticasone-salmeterol Your last dose was: Your next dose is: Take 1 Puff by inhalation two (2) times a day. 1 Puff  
    
   
   
   
  
 albuterol 90 mcg/actuation inhaler Commonly known as:  Ryan Deand Your last dose was: Your next dose is: Take 1-2 Puffs by inhalation every four (4) hours as needed for Wheezing or Shortness of Breath. 1-2 Puff  
    
   
   
   
  
 cetirizine 10 mg tablet Commonly known as:  ZYRTEC Your last dose was: Your next dose is: Take 10 mg by mouth daily as needed for Allergies. 10 mg  
    
   
   
   
  
 hydrOXYzine pamoate 25 mg capsule Commonly known as:  VISTARIL Your last dose was: Your next dose is: Take 25 mg by mouth three (3) times daily as needed for Itching. 25 mg  
    
   
   
   
  
 ketotifen 0.025 % (0.035 %) ophthalmic solution Commonly known as:  ZADITOR Your last dose was: Your next dose is:    
   
   
 Administer 1 Drop to both eyes two (2) times daily as needed (ALLERGIES). 1 Drop OLANZapine 10 mg tablet Commonly known as:  ZyPREXA Your last dose was: Your next dose is: Take 10 mg by mouth nightly. 10 mg  
    
   
   
   
  
  
STOP taking these medications   
 albuterol 90 mcg/actuation inhaler Commonly known as:  PROAIR HFA Where to Get Your Medications These medications were sent to [place] [address], [address], [address] [place] [id number] Phone:  [phone] Thiamine Mononitrate 100 mg tablet Information on where to get these meds will be given to you by the nurse or doctor. ! Ask your nurse or doctor about these medications  
  cloNIDine HCl 0.3 mg tablet  
 ferrous sulfate 325 mg (65 mg iron) tablet  
 pantoprazole 40 mg tablet

## 2018-06-05 NOTE — ED NOTES
Pt assisted to bedside commode. Pt visibly shakey, Pt on monitors x 3. Pt resting in bed at this time. Pt slightly anxious. WCTM. Call bell within reach.

## 2018-06-05 NOTE — ED NOTES
Pt presents to ED c/o chest pain due to alcohol  and cocaine use last night. Pt states she drinks \"too much and wishes to stop\". Pt placed on monitors x3. Pt denies SOB, pt denies abdominal pain. Pt feeling tired and \"wants to sleep\". Pt resting in bed. Pt calm and cooperative. Bed locked and in low position. Call bell within reach.

## 2018-06-06 ENCOUNTER — APPOINTMENT (OUTPATIENT)
Dept: ULTRASOUND IMAGING | Age: 43
End: 2018-06-06
Attending: INTERNAL MEDICINE
Payer: MEDICAID

## 2018-06-06 PROBLEM — D64.9 ANEMIA: Status: ACTIVE | Noted: 2018-06-06

## 2018-06-06 PROBLEM — E11.9 TYPE II DIABETES MELLITUS (HCC): Status: ACTIVE | Noted: 2018-06-06

## 2018-06-06 PROBLEM — F10.939 ALCOHOL WITHDRAWAL (HCC): Status: ACTIVE | Noted: 2018-06-06

## 2018-06-06 PROBLEM — J45.909 ASTHMA: Status: ACTIVE | Noted: 2018-06-06

## 2018-06-06 PROBLEM — I10 HTN (HYPERTENSION): Status: ACTIVE | Noted: 2018-06-06

## 2018-06-06 LAB
ALBUMIN SERPL-MCNC: 3.3 G/DL (ref 3.5–5)
ALBUMIN/GLOB SERPL: 0.8 {RATIO} (ref 1.1–2.2)
ALP SERPL-CCNC: 56 U/L (ref 45–117)
ALT SERPL-CCNC: 41 U/L (ref 12–78)
ANION GAP SERPL CALC-SCNC: 8 MMOL/L (ref 5–15)
APTT PPP: 26 SEC (ref 22.1–32)
AST SERPL-CCNC: 65 U/L (ref 15–37)
ATRIAL RATE: 111 BPM
BASOPHILS # BLD: 0 K/UL (ref 0–0.1)
BASOPHILS NFR BLD: 0 % (ref 0–1)
BILIRUB SERPL-MCNC: 1.8 MG/DL (ref 0.2–1)
BUN SERPL-MCNC: 7 MG/DL (ref 6–20)
BUN/CREAT SERPL: 8 (ref 12–20)
CALCIUM SERPL-MCNC: 8.7 MG/DL (ref 8.5–10.1)
CALCULATED P AXIS, ECG09: 59 DEGREES
CALCULATED R AXIS, ECG10: 5 DEGREES
CALCULATED T AXIS, ECG11: 54 DEGREES
CHLORIDE SERPL-SCNC: 105 MMOL/L (ref 97–108)
CO2 SERPL-SCNC: 24 MMOL/L (ref 21–32)
CREAT SERPL-MCNC: 0.9 MG/DL (ref 0.55–1.02)
DIAGNOSIS, 93000: NORMAL
DIFFERENTIAL METHOD BLD: ABNORMAL
EOSINOPHIL # BLD: 0 K/UL (ref 0–0.4)
EOSINOPHIL NFR BLD: 1 % (ref 0–7)
ERYTHROCYTE [DISTWIDTH] IN BLOOD BY AUTOMATED COUNT: 24.7 % (ref 11.5–14.5)
FERRITIN SERPL-MCNC: 10 NG/ML (ref 8–252)
FIBRINOGEN PPP-MCNC: 164 MG/DL (ref 200–475)
FOLATE SERPL-MCNC: 18.6 NG/ML (ref 5–21)
GLOBULIN SER CALC-MCNC: 3.9 G/DL (ref 2–4)
GLUCOSE BLD STRIP.AUTO-MCNC: 115 MG/DL (ref 65–100)
GLUCOSE BLD STRIP.AUTO-MCNC: 123 MG/DL (ref 65–100)
GLUCOSE BLD STRIP.AUTO-MCNC: 133 MG/DL (ref 65–100)
GLUCOSE SERPL-MCNC: 104 MG/DL (ref 65–100)
HAPTOGLOB SERPL-MCNC: 75 MG/DL (ref 30–200)
HCT VFR BLD AUTO: 25.3 % (ref 35–47)
HEMOCCULT STL QL: NEGATIVE
HEMOCCULT STL QL: NEGATIVE
HGB BLD-MCNC: 6.7 G/DL (ref 11.5–16)
HIV 1+2 AB+HIV1 P24 AG SERPL QL IA: NONREACTIVE
HIV12 RESULT COMMENT, HHIVC: NORMAL
IMM GRANULOCYTES # BLD: 0 K/UL (ref 0–0.04)
IMM GRANULOCYTES NFR BLD AUTO: 1 % (ref 0–0.5)
IRON SATN MFR SERPL: 4 % (ref 20–50)
IRON SERPL-MCNC: 13 UG/DL (ref 35–150)
LDH SERPL L TO P-CCNC: 268 U/L (ref 81–246)
LYMPHOCYTES # BLD: 1.3 K/UL (ref 0.8–3.5)
LYMPHOCYTES NFR BLD: 32 % (ref 12–49)
MAGNESIUM SERPL-MCNC: 1.7 MG/DL (ref 1.6–2.4)
MCH RBC QN AUTO: 18 PG (ref 26–34)
MCHC RBC AUTO-ENTMCNC: 26.5 G/DL (ref 30–36.5)
MCV RBC AUTO: 68 FL (ref 80–99)
MONOCYTES # BLD: 0.3 K/UL (ref 0–1)
MONOCYTES NFR BLD: 8 % (ref 5–13)
NEUTS SEG # BLD: 2.4 K/UL (ref 1.8–8)
NEUTS SEG NFR BLD: 58 % (ref 32–75)
NRBC # BLD: 0.04 K/UL (ref 0–0.01)
NRBC BLD-RTO: 1 PER 100 WBC
P-R INTERVAL, ECG05: 140 MS
PLATELET # BLD AUTO: 88 K/UL (ref 150–400)
PMV BLD AUTO: ABNORMAL FL (ref 8.9–12.9)
POTASSIUM SERPL-SCNC: 3.3 MMOL/L (ref 3.5–5.1)
PROT SERPL-MCNC: 7.2 G/DL (ref 6.4–8.2)
Q-T INTERVAL, ECG07: 358 MS
QRS DURATION, ECG06: 80 MS
QTC CALCULATION (BEZET), ECG08: 486 MS
RBC # BLD AUTO: 3.72 M/UL (ref 3.8–5.2)
RBC MORPH BLD: ABNORMAL
RETICS # AUTO: 0.11 M/UL (ref 0.02–0.08)
RETICS/RBC NFR AUTO: 2.7 % (ref 0.7–2.1)
SERVICE CMNT-IMP: ABNORMAL
SODIUM SERPL-SCNC: 137 MMOL/L (ref 136–145)
THERAPEUTIC RANGE,PTTT: NORMAL SECS (ref 58–77)
TIBC SERPL-MCNC: 358 UG/DL (ref 250–450)
VENTRICULAR RATE, ECG03: 111 BPM
VIT B12 SERPL-MCNC: 822 PG/ML (ref 193–986)
WBC # BLD AUTO: 4 K/UL (ref 3.6–11)

## 2018-06-06 PROCEDURE — 30233N1 TRANSFUSION OF NONAUTOLOGOUS RED BLOOD CELLS INTO PERIPHERAL VEIN, PERCUTANEOUS APPROACH: ICD-10-PCS | Performed by: HOSPITALIST

## 2018-06-06 PROCEDURE — 74011250636 HC RX REV CODE- 250/636: Performed by: INTERNAL MEDICINE

## 2018-06-06 PROCEDURE — 82272 OCCULT BLD FECES 1-3 TESTS: CPT | Performed by: INTERNAL MEDICINE

## 2018-06-06 PROCEDURE — 36415 COLL VENOUS BLD VENIPUNCTURE: CPT | Performed by: HOSPITALIST

## 2018-06-06 PROCEDURE — 82962 GLUCOSE BLOOD TEST: CPT

## 2018-06-06 PROCEDURE — 85384 FIBRINOGEN ACTIVITY: CPT | Performed by: INTERNAL MEDICINE

## 2018-06-06 PROCEDURE — 87186 SC STD MICRODIL/AGAR DIL: CPT | Performed by: INTERNAL MEDICINE

## 2018-06-06 PROCEDURE — 85045 AUTOMATED RETICULOCYTE COUNT: CPT | Performed by: INTERNAL MEDICINE

## 2018-06-06 PROCEDURE — 82746 ASSAY OF FOLIC ACID SERUM: CPT | Performed by: INTERNAL MEDICINE

## 2018-06-06 PROCEDURE — 36430 TRANSFUSION BLD/BLD COMPNT: CPT

## 2018-06-06 PROCEDURE — 76700 US EXAM ABDOM COMPLETE: CPT

## 2018-06-06 PROCEDURE — 83010 ASSAY OF HAPTOGLOBIN QUANT: CPT | Performed by: INTERNAL MEDICINE

## 2018-06-06 PROCEDURE — 65660000000 HC RM CCU STEPDOWN

## 2018-06-06 PROCEDURE — 87086 URINE CULTURE/COLONY COUNT: CPT | Performed by: INTERNAL MEDICINE

## 2018-06-06 PROCEDURE — 74011250636 HC RX REV CODE- 250/636: Performed by: EMERGENCY MEDICINE

## 2018-06-06 PROCEDURE — 85025 COMPLETE CBC W/AUTO DIFF WBC: CPT | Performed by: INTERNAL MEDICINE

## 2018-06-06 PROCEDURE — 80053 COMPREHEN METABOLIC PANEL: CPT | Performed by: INTERNAL MEDICINE

## 2018-06-06 PROCEDURE — 86920 COMPATIBILITY TEST SPIN: CPT | Performed by: HOSPITALIST

## 2018-06-06 PROCEDURE — 85730 THROMBOPLASTIN TIME PARTIAL: CPT | Performed by: INTERNAL MEDICINE

## 2018-06-06 PROCEDURE — 76937 US GUIDE VASCULAR ACCESS: CPT

## 2018-06-06 PROCEDURE — 87077 CULTURE AEROBIC IDENTIFY: CPT | Performed by: INTERNAL MEDICINE

## 2018-06-06 PROCEDURE — 76830 TRANSVAGINAL US NON-OB: CPT

## 2018-06-06 PROCEDURE — 74011000258 HC RX REV CODE- 258: Performed by: EMERGENCY MEDICINE

## 2018-06-06 PROCEDURE — P9016 RBC LEUKOCYTES REDUCED: HCPCS | Performed by: HOSPITALIST

## 2018-06-06 PROCEDURE — 83735 ASSAY OF MAGNESIUM: CPT | Performed by: INTERNAL MEDICINE

## 2018-06-06 PROCEDURE — 74011000250 HC RX REV CODE- 250: Performed by: INTERNAL MEDICINE

## 2018-06-06 PROCEDURE — 86900 BLOOD TYPING SEROLOGIC ABO: CPT | Performed by: HOSPITALIST

## 2018-06-06 PROCEDURE — 74011250637 HC RX REV CODE- 250/637: Performed by: INTERNAL MEDICINE

## 2018-06-06 PROCEDURE — 87389 HIV-1 AG W/HIV-1&-2 AB AG IA: CPT | Performed by: INTERNAL MEDICINE

## 2018-06-06 PROCEDURE — 83550 IRON BINDING TEST: CPT | Performed by: INTERNAL MEDICINE

## 2018-06-06 PROCEDURE — 76857 US EXAM PELVIC LIMITED: CPT

## 2018-06-06 PROCEDURE — 82607 VITAMIN B-12: CPT | Performed by: INTERNAL MEDICINE

## 2018-06-06 PROCEDURE — 83615 LACTATE (LD) (LDH) ENZYME: CPT | Performed by: INTERNAL MEDICINE

## 2018-06-06 PROCEDURE — 82728 ASSAY OF FERRITIN: CPT | Performed by: INTERNAL MEDICINE

## 2018-06-06 RX ORDER — VERAPAMIL HYDROCHLORIDE 120 MG/1
80 TABLET, FILM COATED ORAL DAILY
Status: ON HOLD | COMMUNITY
End: 2018-06-06 | Stop reason: DRUGHIGH

## 2018-06-06 RX ORDER — SODIUM CHLORIDE 9 MG/ML
250 INJECTION, SOLUTION INTRAVENOUS AS NEEDED
Status: DISCONTINUED | OUTPATIENT
Start: 2018-06-06 | End: 2018-06-10 | Stop reason: HOSPADM

## 2018-06-06 RX ORDER — LORAZEPAM 2 MG/ML
2 INJECTION INTRAMUSCULAR
Status: DISCONTINUED | OUTPATIENT
Start: 2018-06-06 | End: 2018-06-08

## 2018-06-06 RX ORDER — VERAPAMIL HYDROCHLORIDE 120 MG/1
120 CAPSULE, EXTENDED RELEASE ORAL DAILY
COMMUNITY

## 2018-06-06 RX ORDER — ACETAMINOPHEN 325 MG/1
650 TABLET ORAL
Status: DISCONTINUED | OUTPATIENT
Start: 2018-06-06 | End: 2018-06-10 | Stop reason: HOSPADM

## 2018-06-06 RX ORDER — DIPHENHYDRAMINE HCL 25 MG
25 CAPSULE ORAL ONCE
Status: DISCONTINUED | OUTPATIENT
Start: 2018-06-06 | End: 2018-06-06

## 2018-06-06 RX ORDER — MAGNESIUM SULFATE 100 %
4 CRYSTALS MISCELLANEOUS AS NEEDED
Status: DISCONTINUED | OUTPATIENT
Start: 2018-06-06 | End: 2018-06-10 | Stop reason: HOSPADM

## 2018-06-06 RX ORDER — FLUTICASONE FUROATE AND VILANTEROL 200; 25 UG/1; UG/1
1 POWDER RESPIRATORY (INHALATION) DAILY
Status: DISCONTINUED | OUTPATIENT
Start: 2018-06-06 | End: 2018-06-10 | Stop reason: HOSPADM

## 2018-06-06 RX ORDER — KETOTIFEN FUMARATE 0.35 MG/ML
1 SOLUTION/ DROPS OPHTHALMIC
COMMUNITY

## 2018-06-06 RX ORDER — VERAPAMIL HYDROCHLORIDE 120 MG/1
120 TABLET, FILM COATED, EXTENDED RELEASE ORAL DAILY
Status: DISCONTINUED | OUTPATIENT
Start: 2018-06-07 | End: 2018-06-10 | Stop reason: HOSPADM

## 2018-06-06 RX ORDER — LORAZEPAM 2 MG/ML
4 INJECTION INTRAMUSCULAR
Status: DISCONTINUED | OUTPATIENT
Start: 2018-06-06 | End: 2018-06-08

## 2018-06-06 RX ORDER — INSULIN LISPRO 100 [IU]/ML
INJECTION, SOLUTION INTRAVENOUS; SUBCUTANEOUS
Status: DISCONTINUED | OUTPATIENT
Start: 2018-06-06 | End: 2018-06-10 | Stop reason: HOSPADM

## 2018-06-06 RX ORDER — LEVALBUTEROL INHALATION SOLUTION 1.25 MG/3ML
1.25 SOLUTION RESPIRATORY (INHALATION)
Status: DISCONTINUED | OUTPATIENT
Start: 2018-06-06 | End: 2018-06-10 | Stop reason: HOSPADM

## 2018-06-06 RX ORDER — HYDROXYZINE PAMOATE 25 MG/1
25 CAPSULE ORAL
COMMUNITY

## 2018-06-06 RX ORDER — CLONIDINE HYDROCHLORIDE 0.1 MG/1
0.2 TABLET ORAL
Status: DISCONTINUED | OUTPATIENT
Start: 2018-06-06 | End: 2018-06-10 | Stop reason: HOSPADM

## 2018-06-06 RX ORDER — CETIRIZINE HCL 10 MG
10 TABLET ORAL
COMMUNITY

## 2018-06-06 RX ORDER — OLANZAPINE 10 MG/1
10 TABLET ORAL
COMMUNITY
End: 2019-02-10

## 2018-06-06 RX ORDER — POTASSIUM CHLORIDE 20 MEQ/1
20 TABLET, EXTENDED RELEASE ORAL
Status: COMPLETED | OUTPATIENT
Start: 2018-06-06 | End: 2018-06-06

## 2018-06-06 RX ORDER — DEXTROSE 50 % IN WATER (D50W) INTRAVENOUS SYRINGE
12.5-25 AS NEEDED
Status: DISCONTINUED | OUTPATIENT
Start: 2018-06-06 | End: 2018-06-10 | Stop reason: HOSPADM

## 2018-06-06 RX ORDER — ACETAMINOPHEN 325 MG/1
650 TABLET ORAL ONCE
Status: DISCONTINUED | OUTPATIENT
Start: 2018-06-06 | End: 2018-06-06

## 2018-06-06 RX ADMIN — Medication 10 ML: at 02:19

## 2018-06-06 RX ADMIN — Medication 10 ML: at 09:18

## 2018-06-06 RX ADMIN — LORAZEPAM 2 MG: 2 INJECTION INTRAMUSCULAR; INTRAVENOUS at 22:52

## 2018-06-06 RX ADMIN — POTASSIUM CHLORIDE 20 MEQ: 20 TABLET, EXTENDED RELEASE ORAL at 12:03

## 2018-06-06 RX ADMIN — LORAZEPAM 2 MG: 2 INJECTION INTRAMUSCULAR; INTRAVENOUS at 21:32

## 2018-06-06 RX ADMIN — CEFTRIAXONE SODIUM 1 G: 1 INJECTION, POWDER, FOR SOLUTION INTRAMUSCULAR; INTRAVENOUS at 20:29

## 2018-06-06 RX ADMIN — LORAZEPAM 2 MG: 2 INJECTION INTRAMUSCULAR; INTRAVENOUS at 02:19

## 2018-06-06 RX ADMIN — Medication 10 ML: at 06:03

## 2018-06-06 RX ADMIN — Medication 10 ML: at 20:30

## 2018-06-06 RX ADMIN — Medication 10 ML: at 15:39

## 2018-06-06 RX ADMIN — FLUTICASONE FUROATE AND VILANTEROL TRIFENATATE 1 PUFF: 200; 25 POWDER RESPIRATORY (INHALATION) at 12:03

## 2018-06-06 RX ADMIN — LORAZEPAM 2 MG: 2 INJECTION INTRAMUSCULAR; INTRAVENOUS at 16:16

## 2018-06-06 RX ADMIN — FOLIC ACID: 5 INJECTION, SOLUTION INTRAMUSCULAR; INTRAVENOUS; SUBCUTANEOUS at 15:37

## 2018-06-06 RX ADMIN — ACETAMINOPHEN 650 MG: 325 TABLET ORAL at 18:32

## 2018-06-06 NOTE — ED NOTES
Pt provided blankets. Pt calling out for RN. Pt given crackers for comfort. Lights dimmed.  at bedside. Call bell within reach.

## 2018-06-06 NOTE — PROGRESS NOTES
Bedside shift change report given to Arnoldo Burns (oncoming nurse) by Nicholas Bee (offgoing nurse). Report included the following information SBAR, Kardex, ED Summary, Procedure Summary, Intake/Output, MAR, Recent Results and Cardiac Rhythm NSR.     0840- Attempted x 2 to start PIV with no success. Spoke with PICC team who will come by to attempt PIV or midline. Dr. Aparna Holman aware. 1000- 1st unit RBC started. [address] with Dr. Alok Gonzalez. Confirmed that pt is to only get 2 units RBC. Ok to DC tylenol and benadryl order as 1st unit has already been started. 1539- 2nd unit RBC started. 1630- Pt taken to [address] via stretcher.

## 2018-06-06 NOTE — ED NOTES
TRANSFER - OUT REPORT:    Verbal report given to ARNOLDO Reyes (name) on Celine Campos  being transferred to [facility] [phone] (unit) for routine progression of care       Report consisted of patients Situation, Background, Assessment and   Recommendations(SBAR). Information from the following report(s) SBAR, Kardex, ED Summary, Intake/Output, MAR and Recent Results was reviewed with the receiving nurse. Lines:   Peripheral IV 06/05/18 Left Hand (Active)        Opportunity for questions and clarification was provided.       Patient transported with:   Registered Nurse

## 2018-06-06 NOTE — PROGRESS NOTES
Pharmacy Medication Reconciliation     The patient was interviewed regarding current PTA medication list, use and drug allergies;  patient present in room and obtained permission from patient to discuss drug regimen with visitor(s) present. The patient was questioned regarding use of any other inhalers, topical products, over the counter medications, herbal medications, vitamin products or ophthalmic/nasal/otic medication use. Allergy Update: aspirin (rash) lisinopril (swelling)    Recommendations/Findings: The following amendments were made to the patient's active medication list on file at [facility]:   1) Additions:    Olanzapine 10 mg tablets; Sig: Take 10 mg by mouth nightly.  Cetirizine 10 mg tablets; Sig: Take 10 mg by mouth daily as needed for Allergies.  Hydroxyzine pamoate 25 mg capsules; Sig: Take 25 mg by mouth three (3) times daily as needed for Itching.  Ketotifen 0.025% ophthalmic solution; Sig: Administer 1 Drop to both eyes two (2) times daily as needed (ALLERGIES). 2) Deletions:    Albuterol (PROAIR HFA) 90 mcg/actuation inhaler; duplicate   Neomycin-polymyxin-hydrocortisone (CORTISPORIN) otic solution    3) Changes:    Verapamil 120 mg ER capsules: dosage adjustment; Sig: Take 120 mg by mouth daily.    -Clarified PTA med list with patient, patient's pharmacy ([facility]), , Rx Query. PTA medication list was corrected to the following:     Prior to Admission Medications   Prescriptions Last Dose Informant Patient Reported? Taking? OLANZapine (ZYPREXA) 10 mg tablet   Yes Yes   Sig: Take 10 mg by mouth nightly. albuterol (PROVENTIL, VENTOLIN) 90 mcg/actuation inhaler   No No   Sig: Take 1-2 Puffs by inhalation every four (4) hours as needed for Wheezing or Shortness of Breath. cetirizine (ZYRTEC) 10 mg tablet   Yes Yes   Sig: Take 10 mg by mouth daily as needed for Allergies.    fluticasone-salmeterol (ADVAIR DISKUS) 250-50 mcg/dose diskus inhaler   Yes No   Sig: Take 1 Puff by inhalation two (2) times a day.   hydrOXYzine pamoate (VISTARIL) 25 mg capsule   Yes Yes   Sig: Take 25 mg by mouth three (3) times daily as needed for Itching.   ketotifen (ZADITOR) 0.025 % (0.035 %) ophthalmic solution   Yes Yes   Sig: Administer 1 Drop to both eyes two (2) times daily as needed (ALLERGIES). verapamil ER (VERELAN) 120 mg ER capsule   Yes Yes   Sig: Take 120 mg by mouth daily.       Facility-Administered Medications: None          Thank you,  [address] [employer]

## 2018-06-06 NOTE — CDMP QUERY
Dr. Bridget Vicente :  Patient is noted to have a BMI of 44.29. Please clarify if this patient is:     =>Morbidly obese (BMI ³ 40)  =>Obese (BMI 30 - 39.9)  =>Overweight (BMI 25 - 29.9)  =>Other explanation of clinical findings  =>Clinically Undetermined (no explanation for clinical findings)    Presentation: 5'3\", 250 lbs = BMI 44.29    REFERENCE:  The [address] [place] [address] has issued a statement indicating that, \"Individuals who are overweight, obese, or morbidly obese are at an increased risk for certain medical conditions when compared to persons of normal weight. Therefore, these conditions are always clinically significant and reportable when documented by the provider. Please clarify and document your clinical opinion in the progress notes and discharge summary, including the definitive and or presumptive diagnosis, (suspected or probable), related to the above clinical findings. Please include clinical findings supporting your diagnosis.      Thank Los Jain  [phone]

## 2018-06-06 NOTE — PROGRESS NOTES
Pt admitted to PCU room # [phone] from ER via stretcher. Spouse in tow. Dual skin assessment performed by this nurse and Tay Quinn. No skin breakdown, pressure sores, or rashes noted. CIWA score of 9 at this time. Medicated with prn Ativan per orders. 2675 Hospitalist notified of this morning lab results. Hemoglobin of 6.7. New orders received. 7261 Consent obtained from pt for blood transfusion. 4097 Type and cross obtained and sent to lab.  1843 Multiple attempts to place a new PIV. Unsuccessful at this time. Will pass on to have the IV team try when they come in.

## 2018-06-06 NOTE — ED NOTES
Pt urinated herself, states she fell asleep; pt cleaned and bed linens changed. Call bell within reach.

## 2018-06-06 NOTE — ED NOTES
Pt medicated per MAR. Pt resting in bed at this time. Bed locked and in low position. Call bell within reach.

## 2018-06-06 NOTE — ED NOTES
Pts  bedside. Pt IVF infusing at this time. Pt provided lunch box for comfort. Pt on monitors x 3. WCTM. Call bell within reach.

## 2018-06-06 NOTE — H&P
Hospitalist Admission Note    NAME: Michael Armenta   :  1975   MRN:  [id number]     Date/Time:  2018 12:24 AM    Patient PCP: Jaclyn Ladd MD  ______________________________________________________________________  Given the patient's current clinical presentation, I have a high level of concern for decompensation if discharged from the emergency department. Complex decision making was performed, which includes reviewing the patient's available past medical records, laboratory results, and x-ray films. My assessment of this patient's clinical condition and my plan of care is as follows. Assessment / Plan:  Alcohol withdrawal vs cocaine overdose  Poly substance drug abuse  Admit to U  CIWa with aitvan  Banana bag  Counseled cessation   UDS positive for cocaine and Opiods    Iron deficiency Anemia  Suspect due to Menorrhagia (reported by pt heavy menses)  Check pelvic US, Iron Profile, Ferritin, B12, folate  May need IV iron  Hematology consult  Check stool for occult blood    Cocaine induced chest pain night before  Tn negative    HTN   reports non compliance, pt doesn't remember the meds  Pharmacy consult for medrec  PRN clonidine for now, resume PO meds once info available from pharmacy about home meds    Possible UTI  Will try to addon urine cultures to UA specimen  IV Rocephin    Type II diabetes mellitus  Pt doesn't remember the name of PO meds  SSI for now  Pharmacy consult as above    Asthma   Continue Advair, PRN nebs    Code Status: Full  Surrogate Decision Maker:     DVT Prophylaxis: Lovenox    Baseline: multi drug abuse      Subjective:   CHIEF COMPLAINT: shaking, sweating    HISTORY OF PRESENT ILLNESS:     Michael Armenta is a 38 y.o.  female who presents with complaining of shaking and swelling. Pt did report having shortness of breath and chest pain when used cocaine night before. Pt reported alcohol and cocaine abuse.  Pt denies any fever, chills, nausea, vomiting, diarrhea, problems urination, shortness of breath. Pt did mention she wants to quit alcohol in ED. In ED pt noted to have tachycardia and high blood pressure. We were asked to admit for work up and evaluation of the above problems. Past Medical History:   Diagnosis Date    Asthma     Diabetes ([address]     Hypertension     Psychiatric disorder     depression    Sleep apnea         Past Surgical History:   Procedure Laterality Date    HX TUBAL LIGATION         Social History   Substance Use Topics    Smoking status: Never Smoker    Smokeless tobacco: Never Used    Alcohol use Yes      Comment: 2 or 3 40's a day 7/18/17        Family History   Problem Relation Age of Onset    Alcohol abuse Mother     Alcohol abuse Father      Allergies   Allergen Reactions    Aspirin Rash     Patient denies allergy, sometimes itches    Lisinopril Swelling        Prior to Admission medications    Medication Sig Start Date End Date Taking? Authorizing Provider   albuterol (PROAIR HFA) 90 mcg/actuation inhaler Take 1-2 Puffs by inhalation every four (4) hours as needed for Wheezing. 5/17/18   [place]   neomycin-polymyxin-hydrocortisone (CORTISPORIN) otic solution 3-4 Drops by Otic route four (4) times daily. 12/15/17   Virginia Rocha, ADDI   albuterol (PROVENTIL, VENTOLIN) 90 mcg/actuation inhaler Take 1-2 Puffs by inhalation every four (4) hours as needed for Wheezing or Shortness of Breath. 9/22/17   Tan Smith MD   fluticasone-salmeterol (ADVAIR DISKUS) 250-50 mcg/dose diskus inhaler Take 1 Puff by inhalation two (2) times a day. Historical Provider       REVIEW OF SYSTEMS:     I am not able to complete the review of systems because:    The patient is intubated and sedated    The patient has altered mental status due to his acute medical problems    The patient has baseline aphasia from prior stroke(s)    The patient has baseline dementia and is not reliable historian    The patient is in acute medical distress and unable to provide information           Total of 12 systems reviewed as follows:       POSITIVE= underlined text  Negative = text not underlined  General:  fever, chills, sweats, generalized weakness, weight loss/gain,      loss of appetite, shaking  Eyes:    blurred vision, eye pain, loss of vision, double vision  ENT:    rhinorrhea, pharyngitis   Respiratory:   cough, sputum production, SOB, ZHANG, wheezing, pleuritic pain   Cardiology:   chest pain, palpitations, orthopnea, PND, edema, syncope   Gastrointestinal:  abdominal pain , N/V, diarrhea, dysphagia, constipation, bleeding   Genitourinary:  frequency, urgency, dysuria, hematuria, incontinence   Muskuloskeletal :  arthralgia, myalgia, back pain  Hematology:  easy bruising, nose or gum bleeding, lymphadenopathy   Dermatological: rash, ulceration, pruritis, color change / jaundice  Endocrine:   hot flashes or polydipsia   Neurological:  headache, dizziness, confusion, focal weakness, paresthesia,     Speech difficulties, memory loss, gait difficulty  Psychological: Feelings of anxiety, depression, agitation    Objective:   VITALS:    Visit Vitals    BP (!) 164/97    Pulse 99    Temp 98.2 °F (36.8 °C)    Resp 23    Ht 5' 3\" (1.6 m)    Wt 113.4 kg (250 lb)    SpO2 100%    BMI 44.29 kg/m2       PHYSICAL EXAM:    General:    Alert, cooperative, no distress, appears stated age. HEENT: Atraumatic, anicteric sclerae, pink conjunctivae     No oral ulcers, mucosa moist, throat clear, dentition fair  Neck:  Supple, symmetrical,  thyroid: non tender  Lungs:   Clear to auscultation bilaterally. No Wheezing or Rhonchi. No rales. Chest wall:  No tenderness  No Accessory muscle use. Heart:   Regular  rhythm,  No  murmur   No edema  Abdomen:   Soft, non-tender. Not distended. Bowel sounds normal  Extremities: No cyanosis.   No clubbing,      Skin turgor normal, Capillary refill normal, Radial dial pulse 2+  Skin:     Not pale. Not Jaundiced  No rashes   Psych:  Good insight. Not depressed. Not anxious or agitated. Neurologic: EOMs intact. No facial asymmetry. No aphasia or slurred speech. Symmetrical strength, Sensation grossly intact. Alert and oriented X 4.     _______________________________________________________________________  Care Plan discussed with:    Comments   Patient y    Family  y At bedside   RN y    Care Manager                    Consultant:      _______________________________________________________________________  Expected  Disposition:   Home with Family y   HH/PT/OT/RN    SNF/LTC    REGIS    ________________________________________________________________________  TOTAL TIME: 61 Minutes    Critical Care Provided     Minutes non procedure based      Comments    y Reviewed previous records   >50% of visit spent in counseling and coordination of care y Discussion with patient and family and questions answered       ________________________________________________________________________  Signed: Sarmad Davis MD    Procedures: see electronic medical records for all procedures/Xrays and details which were not copied into this note but were reviewed prior to creation of Plan.     LAB DATA REVIEWED:    Recent Results (from the past 24 hour(s))   EKG, 12 LEAD, INITIAL    Collection Time: 06/05/18  3:38 PM   Result Value Ref Range    Ventricular Rate 111 BPM    Atrial Rate 111 BPM    P-R Interval 140 ms    QRS Duration 80 ms    Q-T Interval 358 ms    QTC Calculation (Bezet) 486 ms    Calculated P Axis 59 degrees    Calculated R Axis 5 degrees    Calculated T Axis 54 degrees    Diagnosis       Sinus tachycardia  When compared with ECG of 06-AUG-2017 05:25,  No significant change was found     METABOLIC PANEL, COMPREHENSIVE    Collection Time: 06/05/18  4:53 PM   Result Value Ref Range    Sodium 136 136 - 145 mmol/L    Potassium 3.9 3.5 - 5.1 mmol/L    Chloride 101 97 - 108 mmol/L    CO2 25 21 - 32 mmol/L    Anion gap 10 5 - 15 mmol/L    Glucose 127 (H) 65 - 100 mg/dL    BUN 5 (L) 6 - 20 MG/DL    Creatinine 0.88 0.55 - 1.02 MG/DL    BUN/Creatinine ratio 6 (L) 12 - 20      GFR est AA >60 >60 ml/min/1.73m2    GFR est non-AA >60 >60 ml/min/1.73m2    Calcium 9.2 8.5 - 10.1 MG/DL    Bilirubin, total 2.1 (H) 0.2 - 1.0 MG/DL    ALT (SGPT) 52 12 - 78 U/L    AST (SGOT) 97 (H) 15 - 37 U/L    Alk. phosphatase 67 45 - 117 U/L    Protein, total 8.4 (H) 6.4 - 8.2 g/dL    Albumin 3.9 3.5 - 5.0 g/dL    Globulin 4.5 (H) 2.0 - 4.0 g/dL    A-G Ratio 0.9 (L) 1.1 - 2.2     CBC WITH AUTOMATED DIFF    Collection Time: 06/05/18  4:53 PM   Result Value Ref Range    WBC 4.2 3.6 - 11.0 K/uL    RBC 4.21 3.80 - 5.20 M/uL    HGB 7.7 (L) 11.5 - 16.0 g/dL    HCT 28.0 (L) 35.0 - 47.0 %    MCV 66.5 (L) 80.0 - 99.0 FL    MCH 18.3 (L) 26.0 - 34.0 PG    MCHC 27.5 (L) 30.0 - 36.5 g/dL    RDW 24.7 (H) 11.5 - 14.5 %    PLATELET 89 (L) 313 - 400 K/uL    MPV ABNORMAL 8.9 - 12.9 FL    NRBC 0.9 (H) 0  WBC    ABSOLUTE NRBC 0.04 (H) 0.00 - 0.01 K/uL    NEUTROPHILS 71 32 - 75 %    LYMPHOCYTES 18 12 - 49 %    MONOCYTES 8 5 - 13 %    EOSINOPHILS 1 0 - 7 %    BASOPHILS 0 0 - 1 %    IMMATURE GRANULOCYTES 2 (H) 0.0 - 0.5 %    ABS. NEUTROPHILS 3.0 1.8 - 8.0 K/UL    ABS. LYMPHOCYTES 0.8 0.8 - 3.5 K/UL    ABS. MONOCYTES 0.3 0.0 - 1.0 K/UL    ABS. EOSINOPHILS 0.0 0.0 - 0.4 K/UL    ABS. BASOPHILS 0.0 0.0 - 0.1 K/UL    ABS. IMM.  GRANS. 0.1 (H) 0.00 - 0.04 K/UL    DF SMEAR SCANNED      RBC COMMENTS MICROCYTOSIS  3+        RBC COMMENTS HYPOCHROMIA  3+        RBC COMMENTS TARGET CELLS  2+       MAGNESIUM    Collection Time: 06/05/18  4:53 PM   Result Value Ref Range    Magnesium 1.4 (L) 1.6 - 2.4 mg/dL   PROTHROMBIN TIME + INR    Collection Time: 06/05/18  4:53 PM   Result Value Ref Range    INR 1.2 (H) 0.9 - 1.1      Prothrombin time 11.9 (H) 9.0 - 11.1 sec   URINALYSIS W/MICROSCOPIC    Collection Time: 06/05/18  4:56 PM   Result Value Ref Range    Color DARK YELLOW      Appearance CLOUDY (A) CLEAR      Specific gravity 1.019 1.003 - 1.030      pH (UA) 7.0 5.0 - 8.0      Protein TRACE (A) NEG mg/dL    Glucose NEGATIVE  NEG mg/dL    Ketone NEGATIVE  NEG mg/dL    Blood NEGATIVE  NEG      Urobilinogen 4.0 (H) 0.2 - 1.0 EU/dL    Nitrites POSITIVE (A) NEG      Leukocyte Esterase MODERATE (A) NEG      WBC 5-10 0 - 4 /hpf    RBC 0-5 0 - 5 /hpf    Epithelial cells FEW FEW /lpf    Bacteria 3+ (A) NEG /hpf   DRUG SCREEN, URINE    Collection Time: 06/05/18  4:56 PM   Result Value Ref Range    AMPHETAMINES NEGATIVE  NEG      BARBITURATES NEGATIVE  NEG      BENZODIAZEPINES NEGATIVE  NEG      COCAINE POSITIVE (A) NEG      METHADONE NEGATIVE  NEG      OPIATES POSITIVE (A) NEG      PCP(PHENCYCLIDINE) NEGATIVE  NEG      THC (TH-CANNABINOL) NEGATIVE  NEG      Drug screen comment (NOTE)    BILIRUBIN, CONFIRM    Collection Time: 06/05/18  4:56 PM   Result Value Ref Range    Bilirubin UA, confirm NEGATIVE  NEG     TROPONIN I    Collection Time: 06/05/18  9:26 PM   Result Value Ref Range    Troponin-I, Qt. <0.04 <0.05 ng/mL   ETHYL ALCOHOL    Collection Time: 06/05/18 11:10 PM   Result Value Ref Range    ALCOHOL(ETHYL),SERUM <10 <10 MG/DL

## 2018-06-06 NOTE — CONSULTS
Jl Forte  MR#: [id number]  : 1975  ACCOUNT #: [de-identified]   DATE OF SERVICE: 2018    REASON FOR CONSULTATION:  Anemia. Also, with some thrombocytopenia. REFERRING PHYSICIAN:  Yris Nelson MD     HISTORY OF PRESENT ILLNESS:  The patient is a 68-year-old [facility] American female who presented to the emergency room with some shaking and some shortness of breath and chest pain. She used cocaine the night before. She drinks quite a bit of alcohol. She was found to be anemic. Evidently, she does have heavy periods. We are asked to see her for further evaluation. Of note, she was also found to have a urinary tract infection. She is on antibiotics for that. This morning she is sleepy, but awakes and is appropriate. She really has no complaints this morning and we are asked to see her for further evaluation. PAST MEDICAL HISTORY:  Significant for some depression, hypertension, diabetes, asthma. ALLERGIES:  LISINOPRIL. SOCIAL HISTORY:  She does not smoke, but she does use cocaine. She drinks every day. MEDICATIONS:  She is on ceftriaxone, Goody bag. REVIEW OF SYSTEMS:  Twelve point systems done and negative except for as above. PHYSICAL EXAMINATION:  VITAL SIGNS:  Temperature 98.8, pulse 94, blood pressure 166/96, respirations 20, satting 97% on room air. GENERAL:  Lying in bed in no acute distress. HEENT:  Normocephalic, atraumatic. Extraocular muscles are intact. Oropharynx clear, without lesion. NECK:  Supple. No cervical, supraclavicular or axillary lymphadenopathy appreciated. CARDIOVASCULAR:  Regular rate and rhythm. LUNGS:  Clear to auscultation bilaterally. ABDOMEN:  Normal bowel sounds, soft, nontender, nondistended. No hepatosplenomegaly. EXTREMITIES:  No clubbing, cyanosis or edema. NEUROLOGIC:  Nonfocal.    LABORATORY DATA:  White blood cell count 4.0, hemoglobin 6.7, platelets 88.   Normal differential.  Chemistry:  Sodium 137, potassium 3.3, BUN 7, creatinine 0.9, total bilirubin 1.8, ALT 41, AST 65, alkaline phosphatase 56. B12 822, folate 18.6 iron 13, TIBC 358, iron percent sat is 4, ferritin 10. ASSESSMENT AND PLAN:  The patient is a 40-year-old -American female who we are asked to see for anemia. She also has some thrombocytopenia. This could all be due to her alcohol use. She also evidently has some heavy periods. Her hemoglobin is down to 6.7 today. I am going to go ahead and give her 2 units of blood. We will continue to follow along with you. I will check labs for any signs of hemolysis. Check for DIC. Check copper level. I will take a look at her peripheral smear. We will continue to follow along with you and make further recommendations as needed.       MD MARYLOU Martinez /   D: 06/06/2018 10:28     T: 06/06/2018 10:46  JOB #: [id number]

## 2018-06-06 NOTE — PROGRESS NOTES
Seems to have history of suicidal ideation in the patient but currently denies any suicidal thought/ideation

## 2018-06-06 NOTE — ED NOTES
Pt medicated per MAR. Pt voiding over floor. Pt cleaned up and resting in bed at this time.  and friends bedside. Pt on monitors x 3. Call bell within reach.

## 2018-06-07 LAB
ALBUMIN SERPL-MCNC: 3.1 G/DL (ref 3.5–5)
ALBUMIN/GLOB SERPL: 0.8 {RATIO} (ref 1.1–2.2)
ALP SERPL-CCNC: 63 U/L (ref 45–117)
ALT SERPL-CCNC: 44 U/L (ref 12–78)
ANION GAP SERPL CALC-SCNC: 8 MMOL/L (ref 5–15)
AST SERPL-CCNC: 98 U/L (ref 15–37)
BASOPHILS # BLD: 0 K/UL (ref 0–0.1)
BASOPHILS NFR BLD: 0 % (ref 0–1)
BILIRUB SERPL-MCNC: 1.3 MG/DL (ref 0.2–1)
BUN SERPL-MCNC: 10 MG/DL (ref 6–20)
BUN/CREAT SERPL: 12 (ref 12–20)
CALCIUM SERPL-MCNC: 8.4 MG/DL (ref 8.5–10.1)
CHLORIDE SERPL-SCNC: 108 MMOL/L (ref 97–108)
CO2 SERPL-SCNC: 23 MMOL/L (ref 21–32)
CREAT SERPL-MCNC: 0.84 MG/DL (ref 0.55–1.02)
DIFFERENTIAL METHOD BLD: ABNORMAL
EOSINOPHIL # BLD: 0.2 K/UL (ref 0–0.4)
EOSINOPHIL NFR BLD: 3 % (ref 0–7)
ERYTHROCYTE [DISTWIDTH] IN BLOOD BY AUTOMATED COUNT: 24.1 % (ref 11.5–14.5)
GLOBULIN SER CALC-MCNC: 3.9 G/DL (ref 2–4)
GLUCOSE BLD STRIP.AUTO-MCNC: 116 MG/DL (ref 65–100)
GLUCOSE BLD STRIP.AUTO-MCNC: 117 MG/DL (ref 65–100)
GLUCOSE BLD STRIP.AUTO-MCNC: 129 MG/DL (ref 65–100)
GLUCOSE BLD STRIP.AUTO-MCNC: 90 MG/DL (ref 65–100)
GLUCOSE SERPL-MCNC: 102 MG/DL (ref 65–100)
HCT VFR BLD AUTO: 30.6 % (ref 35–47)
HEMOCCULT STL QL: POSITIVE
HGB BLD-MCNC: 8.7 G/DL (ref 11.5–16)
IMM GRANULOCYTES # BLD: 0.1 K/UL (ref 0–0.04)
IMM GRANULOCYTES NFR BLD AUTO: 2 % (ref 0–0.5)
LYMPHOCYTES # BLD: 1.6 K/UL (ref 0.8–3.5)
LYMPHOCYTES NFR BLD: 32 % (ref 12–49)
MAGNESIUM SERPL-MCNC: 1.7 MG/DL (ref 1.6–2.4)
MCH RBC QN AUTO: 19.9 PG (ref 26–34)
MCHC RBC AUTO-ENTMCNC: 28.4 G/DL (ref 30–36.5)
MCV RBC AUTO: 70 FL (ref 80–99)
MONOCYTES # BLD: 0.3 K/UL (ref 0–1)
MONOCYTES NFR BLD: 6 % (ref 5–13)
NEUTS SEG # BLD: 2.8 K/UL (ref 1.8–8)
NEUTS SEG NFR BLD: 57 % (ref 32–75)
NRBC # BLD: 0.04 K/UL (ref 0–0.01)
NRBC BLD-RTO: 0.8 PER 100 WBC
PHOSPHATE SERPL-MCNC: 3.8 MG/DL (ref 2.6–4.7)
PLATELET # BLD AUTO: 95 K/UL (ref 150–400)
POTASSIUM SERPL-SCNC: 3.2 MMOL/L (ref 3.5–5.1)
PROT SERPL-MCNC: 7 G/DL (ref 6.4–8.2)
RBC # BLD AUTO: 4.37 M/UL (ref 3.8–5.2)
RBC MORPH BLD: ABNORMAL
SERVICE CMNT-IMP: ABNORMAL
SERVICE CMNT-IMP: NORMAL
SODIUM SERPL-SCNC: 139 MMOL/L (ref 136–145)
WBC # BLD AUTO: 5 K/UL (ref 3.6–11)
WBC #/AREA STL HPF: NORMAL /HPF (ref 0–4)

## 2018-06-07 PROCEDURE — 82272 OCCULT BLD FECES 1-3 TESTS: CPT | Performed by: INTERNAL MEDICINE

## 2018-06-07 PROCEDURE — 89055 LEUKOCYTE ASSESSMENT FECAL: CPT | Performed by: INTERNAL MEDICINE

## 2018-06-07 PROCEDURE — 87899 AGENT NOS ASSAY W/OPTIC: CPT | Performed by: HOSPITALIST

## 2018-06-07 PROCEDURE — 74011250636 HC RX REV CODE- 250/636: Performed by: EMERGENCY MEDICINE

## 2018-06-07 PROCEDURE — 74011250637 HC RX REV CODE- 250/637: Performed by: EMERGENCY MEDICINE

## 2018-06-07 PROCEDURE — 85025 COMPLETE CBC W/AUTO DIFF WBC: CPT | Performed by: INTERNAL MEDICINE

## 2018-06-07 PROCEDURE — 80053 COMPREHEN METABOLIC PANEL: CPT | Performed by: INTERNAL MEDICINE

## 2018-06-07 PROCEDURE — 65660000000 HC RM CCU STEPDOWN

## 2018-06-07 PROCEDURE — 74011250637 HC RX REV CODE- 250/637: Performed by: INTERNAL MEDICINE

## 2018-06-07 PROCEDURE — 74011000258 HC RX REV CODE- 258: Performed by: EMERGENCY MEDICINE

## 2018-06-07 PROCEDURE — 82962 GLUCOSE BLOOD TEST: CPT

## 2018-06-07 PROCEDURE — 87493 C DIFF AMPLIFIED PROBE: CPT | Performed by: INTERNAL MEDICINE

## 2018-06-07 PROCEDURE — 82525 ASSAY OF COPPER: CPT | Performed by: INTERNAL MEDICINE

## 2018-06-07 PROCEDURE — 36415 COLL VENOUS BLD VENIPUNCTURE: CPT | Performed by: INTERNAL MEDICINE

## 2018-06-07 PROCEDURE — 83735 ASSAY OF MAGNESIUM: CPT | Performed by: INTERNAL MEDICINE

## 2018-06-07 PROCEDURE — 84100 ASSAY OF PHOSPHORUS: CPT | Performed by: INTERNAL MEDICINE

## 2018-06-07 PROCEDURE — 74011000250 HC RX REV CODE- 250: Performed by: INTERNAL MEDICINE

## 2018-06-07 PROCEDURE — 74011250636 HC RX REV CODE- 250/636: Performed by: INTERNAL MEDICINE

## 2018-06-07 RX ORDER — HYDROXYZINE PAMOATE 25 MG/1
25 CAPSULE ORAL
Status: DISCONTINUED | OUTPATIENT
Start: 2018-06-07 | End: 2018-06-10 | Stop reason: HOSPADM

## 2018-06-07 RX ORDER — PANTOPRAZOLE SODIUM 40 MG/1
40 TABLET, DELAYED RELEASE ORAL
Status: DISCONTINUED | OUTPATIENT
Start: 2018-06-07 | End: 2018-06-10 | Stop reason: HOSPADM

## 2018-06-07 RX ORDER — FOLIC ACID 1 MG/1
1 TABLET ORAL DAILY
Status: DISCONTINUED | OUTPATIENT
Start: 2018-06-08 | End: 2018-06-10 | Stop reason: HOSPADM

## 2018-06-07 RX ORDER — LANOLIN ALCOHOL/MO/W.PET/CERES
100 CREAM (GRAM) TOPICAL DAILY
Status: DISCONTINUED | OUTPATIENT
Start: 2018-06-08 | End: 2018-06-07 | Stop reason: RX

## 2018-06-07 RX ORDER — OLANZAPINE 10 MG/1
10 TABLET ORAL EVERY EVENING
Status: DISCONTINUED | OUTPATIENT
Start: 2018-06-07 | End: 2018-06-10 | Stop reason: HOSPADM

## 2018-06-07 RX ORDER — LANOLIN ALCOHOL/MO/W.PET/CERES
1 CREAM (GRAM) TOPICAL 2 TIMES DAILY WITH MEALS
Status: DISCONTINUED | OUTPATIENT
Start: 2018-06-07 | End: 2018-06-10 | Stop reason: HOSPADM

## 2018-06-07 RX ORDER — POTASSIUM CHLORIDE 750 MG/1
40 TABLET, FILM COATED, EXTENDED RELEASE ORAL ONCE
Status: COMPLETED | OUTPATIENT
Start: 2018-06-07 | End: 2018-06-07

## 2018-06-07 RX ORDER — ASPIRIN 325 MG/1
100 TABLET, FILM COATED ORAL DAILY
Status: DISCONTINUED | OUTPATIENT
Start: 2018-06-08 | End: 2018-06-10 | Stop reason: HOSPADM

## 2018-06-07 RX ADMIN — PANTOPRAZOLE SODIUM 40 MG: 40 TABLET, DELAYED RELEASE ORAL at 18:41

## 2018-06-07 RX ADMIN — Medication 10 ML: at 14:59

## 2018-06-07 RX ADMIN — VERAPAMIL HYDROCHLORIDE 120 MG: 120 TABLET, FILM COATED, EXTENDED RELEASE ORAL at 08:39

## 2018-06-07 RX ADMIN — POTASSIUM CHLORIDE 40 MEQ: 750 TABLET, EXTENDED RELEASE ORAL at 12:44

## 2018-06-07 RX ADMIN — Medication 10 ML: at 05:49

## 2018-06-07 RX ADMIN — OLANZAPINE 10 MG: 10 TABLET, FILM COATED ORAL at 18:21

## 2018-06-07 RX ADMIN — FOLIC ACID: 5 INJECTION, SOLUTION INTRAMUSCULAR; INTRAVENOUS; SUBCUTANEOUS at 08:39

## 2018-06-07 RX ADMIN — FLUTICASONE FUROATE AND VILANTEROL TRIFENATATE 1 PUFF: 200; 25 POWDER RESPIRATORY (INHALATION) at 09:46

## 2018-06-07 RX ADMIN — CEFTRIAXONE SODIUM 1 G: 1 INJECTION, POWDER, FOR SOLUTION INTRAMUSCULAR; INTRAVENOUS at 20:16

## 2018-06-07 RX ADMIN — CLONIDINE HYDROCHLORIDE 0.2 MG: 0.1 TABLET ORAL at 20:31

## 2018-06-07 RX ADMIN — Medication 10 ML: at 22:00

## 2018-06-07 RX ADMIN — FERROUS SULFATE TAB 325 MG (65 MG ELEMENTAL FE) 325 MG: 325 (65 FE) TAB at 18:21

## 2018-06-07 NOTE — PROGRESS NOTES
Hospitalist Progress Note    NAME: Octaviano Khan   :  1975   MRN:  [id number]       Assessment / Plan:  Alcohol withdrawal vs cocaine overdose  Poly substance drug abuse  in PCU, transfer to [facility] with aitvan  Banana bag, change to po vit, taking pos  Counseled cessation   UDS positive for cocaine and Opiods  Resume home antipsychotic, odd affect, avoid oversedation with ativan unless needed for DTs, discussed with nurse     Iron deficiency Anemia  Suspect due to Menorrhagia (reported by pt heavy menses)  +fibroids on pelvic US, Iron Profile, Ferritin, B12, folate c/w Iron def anemia, ferritin of 10  Iron per heme  S/p transfusion, h/h better, follow  Hematology appreciated  1/3 heme+stool for occult blood  Add ppi, ask GI to see in consult    chronic diarrhea- stool studies, gi consult, add probiotic     hypok- supp po and recheck in am     Cocaine induced chest pain night before admission, resolved  Tn negative     HTN, uncontrolled  PRN clonidine    resume PO meds, verapamil, follow     Possible UTI vs contamination  -urine cultures-p GNRs  IV Rocephin     Type II diabetes mellitus  SSI for now       Asthma   Continue Advair, PRN nebs     Code Status: Full  Surrogate Decision Maker:      DVT Prophylaxis: SCDs, anemia   Baseline: multi drug abuse    Prophylaxis: SCD's  Recommended Disposition: Home w/Family ?home health     Subjective:     Chief Complaint / Reason for Physician Visit  Shaky/sweaty    Patient still says she feels a little shaky but better than when she came in. Denies any shortness of breath or chest pain. Her  is at the bedside and says that she is a little sleepier and he thinks it is the medicine that she is getting. She is getting ready to eat lunch. Denies any nausea vomiting or abdominal pain. Has had frequent stools but no melena or blood in her stool and is not currently on her period. Patient denies ever having had an EGD or colonoscopy.     Patient was evaluated at 11:15 AM      Discussed with RN events overnight. Review of Systems:  Symptom Y/N Comments  Symptom Y/N Comments   Fever/Chills    Chest Pain n    Poor Appetite    Edema     Cough    Abdominal Pain n    Sputum    Joint Pain     SOB/ZHANG n   Pruritis/Rash     Nausea/vomit n   Tolerating PT/OT     Diarrhea    Tolerating Diet     Constipation    Other       Could NOT obtain due to:      Objective:     VITALS:   Last 24hrs VS reviewed since prior progress note.  Most recent are:  Patient Vitals for the past 24 hrs:   Temp Pulse Resp BP SpO2   06/07/18 1618 97.8 °F (36.6 °C) 84 18 163/90 98 %   06/07/18 1458 97.8 °F (36.6 °C) 88 20 (!) 163/105 98 %   06/07/18 1051 97.6 °F (36.4 °C) 89 20 (!) 162/94 100 %   06/07/18 0728 98 °F (36.7 °C) 84 20 (!) 162/97 98 %   06/07/18 0310 98.8 °F (37.1 °C) 89 20 156/80 99 %   06/06/18 2320 98.7 °F (37.1 °C) 87 19 149/83 100 %   06/06/18 1919 99 °F (37.2 °C) 96 20 157/86 100 %       Intake/Output Summary (Last 24 hours) at 06/07/18 1816  Last data filed at 06/07/18 1008   Gross per 24 hour   Intake          1986.67 ml   Output              200 ml   Net          1786.67 ml        PHYSICAL EXAM:  Patient is awake, but a little sleepy and oriented ×3 on room air no distress conjunctiva are pink oropharynx mucous membranes are moist cardiovascular regular rate no murmurs rubs or gallops lungs are clear without wheezes rhonchi or crackles somewhat decreased in the bases abdomen is obese bowel sounds present soft nontender extremities no clubbing cyanosis or edema      Reviewed most current lab test results and cultures  YES  Reviewed most current radiology test results   YES  Review and summation of old records today    NO  Reviewed patient's current orders and MAR    YES  PMH/ reviewed - no change compared to H&P  ________________________________________________________________________  Care Plan discussed with:    Comments   Patient y    Family  y    RN y    Care Manager y Consultant                       y Multidiciplinary team rounds were held today with , nursing, pharmacist and clinical coordinator. Patient's plan of care was discussed; medications were reviewed and discharge planning was addressed. ________________________________________________________________________  Total NON critical care TIME:   Minutes    Total CRITICAL CARE TIME Spent:   Minutes non procedure based      Comments   >50% of visit spent in counseling and coordination of care     ________________________________________________________________________  Kelly Brady MD     Procedures: see electronic medical records for all procedures/Xrays and details which were not copied into this note but were reviewed prior to creation of Plan. LABS:  I reviewed today's most current labs and imaging studies.   Pertinent labs include:  Recent Labs      06/07/18   0542  06/06/18   0236  06/05/18   1653   WBC  5.0  4.0  4.2   HGB  8.7*  6.7*  7.7*   HCT  30.6*  25.3*  28.0*   PLT  95*  88*  89*     Recent Labs      06/07/18   0542  06/06/18   0236  06/05/18   1653   NA  139  137  136   K  3.2*  3.3*  3.9   CL  108  105  101   CO2  23  24  25   GLU  102*  104*  127*   BUN  10  7  5*   CREA  0.84  0.90  0.88   CA  8.4*  8.7  9.2   MG  1.7  1.7  1.4*   PHOS  3.8   --    --    ALB  3.1*  3.3*  3.9   TBILI  1.3*  1.8*  2.1*   SGOT  98*  65*  97*   ALT  44  41  52   INR   --    --   1.2*       Signed: Kelly Brady MD

## 2018-06-07 NOTE — PROGRESS NOTES
Hematology Oncology Progress Note    Follow up for: Anemia and thrombocytopenia    Chart notes reviewed since last visit.     Case discussed with following:    Patient complains of the following: feels better    Additional concerns noted by the staff:     Patient Vitals for the past 24 hrs:   BP Temp Pulse Resp SpO2   06/07/18 0728 (!) 162/97 98 °F (36.7 °C) 84 20 98 %   06/07/18 0310 156/80 98.8 °F (37.1 °C) 89 20 99 %   06/06/18 2320 149/83 98.7 °F (37.1 °C) 87 19 100 %   06/06/18 1919 157/86 99 °F (37.2 °C) 96 20 100 %   06/06/18 1515 154/84 98.8 °F (37.1 °C) 89 20 100 %   06/06/18 1430 (!) 172/103 98.6 °F (37 °C) 96 20 94 %   06/06/18 1400 143/85 - 85 - 99 %   06/06/18 1345 137/79 - 89 - 99 %   06/06/18 1330 150/88 98.3 °F (36.8 °C) 88 20 99 %   06/06/18 1315 (!) 163/91 98.1 °F (36.7 °C) 93 20 90 %   06/06/18 1300 (!) 187/97 98.1 °F (36.7 °C) 92 - 100 %   06/06/18 1145 (!) 163/101 97.5 °F (36.4 °C) 98 20 97 %   06/06/18 1115 137/68 98.4 °F (36.9 °C) 88 - 100 %   06/06/18 1056 141/83 98 °F (36.7 °C) 85 22 99 %   06/06/18 1045 129/72 98.7 °F (37.1 °C) 88 18 100 %   06/06/18 1030 184/83 98.8 °F (37.1 °C) 94 20 93 %   06/06/18 1015 (!) 166/96 98.8 °F (37.1 °C) 94 20 97 %       Review of Systems:  12 point ROS done and negative except as above    Physical Examination:  Gen NAD  CV reg  Lungs clear  abd benign    Labs:  Recent Results (from the past 24 hour(s))   GLUCOSE, POC    Collection Time: 06/06/18 10:55 AM   Result Value Ref Range    Glucose (POC) 123 (H) 65 - 100 mg/dL    Performed by Kamille PIEDRA    Collection Time: 06/06/18 12:55 PM   Result Value Ref Range     (H) 81 - 246 U/L   RETICULOCYTE COUNT    Collection Time: 06/06/18 12:55 PM   Result Value Ref Range    Reticulocyte count 2.7 (H) 0.7 - 2.1 %    Absolute Retic Cnt. 0.1129 (H) 0.0164 - 0.0776 M/ul   HAPTOGLOBIN    Collection Time: 06/06/18 12:55 PM   Result Value Ref Range    Haptoglobin 75 30 - 200 mg/dL   FIBRINOGEN    Collection Time: 06/06/18 12:55 PM   Result Value Ref Range    Fibrinogen 164 (L) 200 - 475 mg/dL   HIV 1/2 AG/AB, 4TH GENERATION,W RFLX CONFIRM    Collection Time: 06/06/18 12:55 PM   Result Value Ref Range    HIV 1/2 Interpretation NONREACTIVE NR      HIV 1/2 result comment SEE NOTE     PTT    Collection Time: 06/06/18 12:55 PM   Result Value Ref Range    aPTT 26.0 22.1 - 32.0 sec    aPTT, therapeutic range     58.0 - 77.0 SECS   OCCULT BLOOD, STOOL    Collection Time: 06/06/18  3:02 PM   Result Value Ref Range    Occult blood, stool NEGATIVE  NEG     GLUCOSE, POC    Collection Time: 06/06/18  8:28 PM   Result Value Ref Range    Glucose (POC) 133 (H) 65 - 100 mg/dL    Performed by Larsen Force    GLUCOSE, POC    Collection Time: 06/06/18  9:37 PM   Result Value Ref Range    Glucose (POC) 115 (H) 65 - 100 mg/dL    Performed by Karen Leavitt ()    METABOLIC PANEL, COMPREHENSIVE    Collection Time: 06/07/18  5:42 AM   Result Value Ref Range    Sodium 139 136 - 145 mmol/L    Potassium 3.2 (L) 3.5 - 5.1 mmol/L    Chloride 108 97 - 108 mmol/L    CO2 23 21 - 32 mmol/L    Anion gap 8 5 - 15 mmol/L    Glucose 102 (H) 65 - 100 mg/dL    BUN 10 6 - 20 MG/DL    Creatinine 0.84 0.55 - 1.02 MG/DL    BUN/Creatinine ratio 12 12 - 20      GFR est AA >60 >60 ml/min/1.73m2    GFR est non-AA >60 >60 ml/min/1.73m2    Calcium 8.4 (L) 8.5 - 10.1 MG/DL    Bilirubin, total 1.3 (H) 0.2 - 1.0 MG/DL    ALT (SGPT) 44 12 - 78 U/L    AST (SGOT) 98 (H) 15 - 37 U/L    Alk.  phosphatase 63 45 - 117 U/L    Protein, total 7.0 6.4 - 8.2 g/dL    Albumin 3.1 (L) 3.5 - 5.0 g/dL    Globulin 3.9 2.0 - 4.0 g/dL    A-G Ratio 0.8 (L) 1.1 - 2.2     CBC WITH AUTOMATED DIFF    Collection Time: 06/07/18  5:42 AM   Result Value Ref Range    WBC 5.0 3.6 - 11.0 K/uL    RBC 4.37 3.80 - 5.20 M/uL    HGB 8.7 (L) 11.5 - 16.0 g/dL    HCT 30.6 (L) 35.0 - 47.0 %    MCV 70.0 (L) 80.0 - 99.0 FL    MCH 19.9 (L) 26.0 - 34.0 PG    MCHC 28.4 (L) 30.0 - 36.5 g/dL    RDW 24.1 (H) 11.5 - 14.5 % PLATELET 95 (L) 299 - 400 K/uL    NRBC 0.8 (H) 0  WBC    ABSOLUTE NRBC 0.04 (H) 0.00 - 0.01 K/uL    NEUTROPHILS 57 32 - 75 %    LYMPHOCYTES 32 12 - 49 %    MONOCYTES 6 5 - 13 %    EOSINOPHILS 3 0 - 7 %    BASOPHILS 0 0 - 1 %    IMMATURE GRANULOCYTES 2 (H) 0.0 - 0.5 %    ABS. NEUTROPHILS 2.8 1.8 - 8.0 K/UL    ABS. LYMPHOCYTES 1.6 0.8 - 3.5 K/UL    ABS. MONOCYTES 0.3 0.0 - 1.0 K/UL    ABS. EOSINOPHILS 0.2 0.0 - 0.4 K/UL    ABS. BASOPHILS 0.0 0.0 - 0.1 K/UL    ABS. IMM. GRANS. 0.1 (H) 0.00 - 0.04 K/UL    DF SMEAR SCANNED      RBC COMMENTS ANISOCYTOSIS  1+       MAGNESIUM    Collection Time: 06/07/18  5:42 AM   Result Value Ref Range    Magnesium 1.7 1.6 - 2.4 mg/dL   PHOSPHORUS    Collection Time: 06/07/18  5:42 AM   Result Value Ref Range    Phosphorus 3.8 2.6 - 4.7 MG/DL   GLUCOSE, POC    Collection Time: 06/07/18  7:17 AM   Result Value Ref Range    Glucose (POC) 116 (H) 65 - 100 mg/dL    Performed by Angelia Speaker abd  No splenomegaly. No gallstones.  Borderline large CBD, nonspecific    Assessment and Plan:   Anemia and thrombocytopenia  -HBG better after blood  -PLTs improved  -Probably combination of CELESTINE due to heavy periods and her ETOH use, no splenomegaly on US, no sign of hemolysis, smear unremarkable  -will put on iron  -she should f/u with GYN about her fibroids  -monitor    UTI  -urine cx with Gm negative rods  -abx per hospitalist

## 2018-06-07 NOTE — CONSULTS
[address]     Annika Ladd  MR#: [id number]  : 1975  ACCOUNT #: [de-identified]   DATE OF SERVICE: 2018    REFERRING PHYSICIAN:  Dr. Мария Shannon:  Rashawn Eastman MD    REASON FOR CONSULTATION:  Heme-positive stools, anemia and chronic diarrhea. HISTORY OF PRESENT ILLNESS:  This is a 66-year-old morbidly obese lady with a past medical history of asthma, depression and hypertension who is a heavy alcohol user, who presents to the hospital with \"shakes. \"  She had also been complaining of some nausea. It is thought that her symptoms are related to her withdrawal from alcohol and as a result, the patient was admitted. During the workup, she was also found to have anemia with a hemoglobin of 7.7 with an MCV of 66. She also has diarrhea. Her stool is brown, but is Hemoccult positive. She cannot give a very detailed history because she is confused/somnolent; however, her  is at her bedside who helps with the history taking. According to him she drinks excessive alcohol. She also has heavy menstrual cycles. Her cycles last for about 5 days on a monthly basis and they are pretty heavy. He has never noted her to report any blood per rectum or any melena. We were asked to see her for the anemia and the diarrhea. HOME MEDICATIONS:  Include ProAir, Neosporin, Proventil and Advair. PAST MEDICAL HISTORY:  Includes asthma, diabetes, depression, sleep apnea, and hypertension, with alcoholism. PAST SURGICAL HISTORY:  Tubal ligation. FAMILY HISTORY:  Positive for alcohol abuse in both the parents. SOCIAL HISTORY:  Significant alcohol use/abuse, has been a smoker. ALLERGIES:  ASPIRIN AND LISINOPRIL. REVIEW OF SYSTEMS:  Detailed review of systems cannot be obtained because of change in mental status.     PHYSICAL EXAMINATION:  VITAL SIGNS:  Her temperature is 97.6, pulse 89, blood pressure is 160/94, sats 100%, respiratory rate is 20. GENERAL:  She is awake, but goes back to sleep. Not making much sense,  is at bedside. HEENT:  Puffy eyelids, morbidly obese. NECK:  Otherwise without any thyromegaly. CARDIOVASCULAR:  Heart with normal heart sounds. RESPIRATORY:  Chest is clear to auscultation. ABDOMEN:  Large, obese, positive bowel sounds. EXTREMITIES:  Without any edema. NEUROLOGIC:  Cranial nerves are intact. LABORATORY WORK:  White cells 5, H and H 8.7 and 30.6, MCV is 70, platelets are 95. INR is 1.2. Chem-7 with a total bilirubin of 1.3, AST of 98. Rest of LFTs are normal.  Iron saturation is low. Iron is low. Ferritin is also low. Chest x-ray performed yesterday showed no acute processes. Transvaginal ultrasound showed a fibroid uterus. ASSESSMENT AND PLAN:  A 40-year-old lady admitted with alcohol withdrawal, has cocaine in her system,  Has iron deficiency anemia with brown, heme-positive stools, and heavy menstrual cycles,. Also has chronic diarrhea. PLAN AND RECOMMENDATION:      I agree with treating her for her admitting presentation possible alcoholism-related withdrawal.      Her LFTs pattern suggests alcoholic hepatitis. A right upper quadrant ultrasound would be of benefit. Stool has been sent for C. Diff and white cells. I suggest getting a culture and sensitivity. Alcoholism can be associated with diarrhea    As this anemia is likely from menstrual bleeding (and with brown albeit heme + stool), I suggest holding off an upper endoscopy and colonoscopy currently and pursuing it as an outpatient. I discussed this with the  in detail. We will follow the patient peripherally while in the hospital.    Thank you for allowing us to see this lady. NELI PATRICIA Page HospitalMD LINNETTE  D: 06/07/2018 13:30     T: 06/07/2018 14:04  JOB #: [id number]

## 2018-06-07 NOTE — PROGRESS NOTES
Bedside shift change report given to Joanne Larios (oncoming nurse) by Marine Cortez (offgoing nurse). Report included the following information SBAR, Kardex, Procedure Summary, Intake/Output, MAR, Recent Results and Cardiac Rhythm NSR.     1000- Stool sample sent to lab for CDiff and Culture. 1318- Spoke with LAB who will add on WBC of stool to sample that was sent earlier today.

## 2018-06-07 NOTE — PROGRESS NOTES
1551: TRANSFER - IN REPORT:    Ver[facility] on Michael Armenta  being received from (unit) for routine progression of care      Report consisted of patients Situation, Background, Assessment and   Recommendations(SBAR). Information from the following report(s) SBAR, Kardex and ED Summary was reviewed with the receiving nurse. Opportunity for questions and clarification was provided. Assessment completed upon patients arrival to unit and care assumed. 1700: Primary Nurse Dione Lanier and Sebastian Meza RN performed a dual skin assessment on this patient No impairment noted  Rosalio score is 18      1900:   Bedside shift change report given to Linda Yin RN (oncoming nurse). Report included the following information SBAR and Kardex. SHIFT SUMMARY:            13[address] Darrell Yip NURSING NOTE   Admission Date 6/5/2018   Admission Diagnosis Alcohol withdrawal ([facility] [address]   Consults IP CONSULT TO HEMATOLOGY  IP CONSULT TO GASTROENTEROLOGY      Cardiac Monitoring [x] Yes [] No      Purposeful Hourly Rounding [x] Yes    Jeovany Score Total Score: 3   Jeovany score 3 or > [x] Bed Alarm [] Avasys [] 1:1 sitter [] Patient refused (Signed refusal form in chart)   Rosalio Score Roaslio Score: 19   Rosalio score 14 or < [] PMT consult [] Wound Care consult    []  Specialty bed  [] Nutrition consult      Influenza Vaccine Received Flu Vaccine for Current Season (usually Sept-March): Not Flu Season           Oxygen needs? [x] Room air Oxygen @  []1L    []2L    []3L   []4L    []5L   []6L via  NC   Chronic home O2 use?  [] Yes [x] No  Perform O2 challenge test and document in progress note using smartphrase (.Homeoxygen)      Last bowel movement Last Bowel Movement Date: 06/07/18      Urinary Catheter             LDAs               Peripheral IV 06/06/18 Left Forearm (Active)   Site Assessment Clean, dry, & intact 6/7/2018  4:24 PM   Phlebitis Assessment 0 6/7/2018  4:24 PM   Infiltration Assessment 0 6/7/2018 4:24 PM   Dressing Status Clean, dry, & intact 6/7/2018  4:24 PM   Dressing Type Transparent 6/7/2018  4:24 PM   Hub Color/Line Status Pink; Infusing 6/7/2018  4:24 PM   Action Taken Other (comment) 6/6/2018  8:00 PM                         Readmission Risk Assessment Tool Score Low Risk            9       Total Score        4 IP Visits Last 12 Months (1-3=4, 4=9, >4=11)    4 Pt. Coverage (Medicare=5 , Medicaid, or Self-Pay=4)    1 Charlson Comorbidity Score (Age + Comorbid Conditions)        Criteria that do not apply:    Has Seen PCP in Last 6 Months (Yes=3, No=0)    . Living with Significant Other. Assisted Living. LTAC. SNF.  or   Rehab    Patient Length of Stay (>5 days = 3)       Expected Length of Stay 3d 9h   Actual Length of Stay 1

## 2018-06-08 ENCOUNTER — APPOINTMENT (OUTPATIENT)
Dept: CT IMAGING | Age: 43
End: 2018-06-08
Attending: EMERGENCY MEDICINE
Payer: MEDICAID

## 2018-06-08 ENCOUNTER — APPOINTMENT (OUTPATIENT)
Dept: ULTRASOUND IMAGING | Age: 43
End: 2018-06-08
Attending: EMERGENCY MEDICINE
Payer: MEDICAID

## 2018-06-08 LAB
ANION GAP SERPL CALC-SCNC: 10 MMOL/L (ref 5–15)
BACTERIA SPEC CULT: ABNORMAL
BASOPHILS # BLD: 0 K/UL (ref 0–0.1)
BASOPHILS NFR BLD: 0 % (ref 0–1)
BUN SERPL-MCNC: 9 MG/DL (ref 6–20)
BUN/CREAT SERPL: 11 (ref 12–20)
C DIFF TOX GENS STL QL NAA+PROBE: NEGATIVE
CALCIUM SERPL-MCNC: 8.2 MG/DL (ref 8.5–10.1)
CC UR VC: ABNORMAL
CHLORIDE SERPL-SCNC: 111 MMOL/L (ref 97–108)
CO2 SERPL-SCNC: 21 MMOL/L (ref 21–32)
CREAT SERPL-MCNC: 0.8 MG/DL (ref 0.55–1.02)
DIFFERENTIAL METHOD BLD: ABNORMAL
EOSINOPHIL # BLD: 0.2 K/UL (ref 0–0.4)
EOSINOPHIL NFR BLD: 3 % (ref 0–7)
ERYTHROCYTE [DISTWIDTH] IN BLOOD BY AUTOMATED COUNT: 24.2 % (ref 11.5–14.5)
GLUCOSE BLD STRIP.AUTO-MCNC: 111 MG/DL (ref 65–100)
GLUCOSE BLD STRIP.AUTO-MCNC: 114 MG/DL (ref 65–100)
GLUCOSE BLD STRIP.AUTO-MCNC: 142 MG/DL (ref 65–100)
GLUCOSE BLD STRIP.AUTO-MCNC: 146 MG/DL (ref 65–100)
GLUCOSE SERPL-MCNC: 131 MG/DL (ref 65–100)
HCT VFR BLD AUTO: 31 % (ref 35–47)
HGB BLD-MCNC: 8.6 G/DL (ref 11.5–16)
IMM GRANULOCYTES # BLD: 0.1 K/UL (ref 0–0.04)
IMM GRANULOCYTES NFR BLD AUTO: 1 % (ref 0–0.5)
LYMPHOCYTES # BLD: 1.5 K/UL (ref 0.8–3.5)
LYMPHOCYTES NFR BLD: 28 % (ref 12–49)
MAGNESIUM SERPL-MCNC: 1.5 MG/DL (ref 1.6–2.4)
MCH RBC QN AUTO: 19.6 PG (ref 26–34)
MCHC RBC AUTO-ENTMCNC: 27.7 G/DL (ref 30–36.5)
MCV RBC AUTO: 70.8 FL (ref 80–99)
MONOCYTES # BLD: 0.4 K/UL (ref 0–1)
MONOCYTES NFR BLD: 7 % (ref 5–13)
NEUTS SEG # BLD: 3.3 K/UL (ref 1.8–8)
NEUTS SEG NFR BLD: 61 % (ref 32–75)
NRBC # BLD: 0.02 K/UL (ref 0–0.01)
NRBC BLD-RTO: 0.4 PER 100 WBC
PLATELET # BLD AUTO: 129 K/UL (ref 150–400)
PMV BLD AUTO: ABNORMAL FL (ref 8.9–12.9)
POTASSIUM SERPL-SCNC: 3.4 MMOL/L (ref 3.5–5.1)
RBC # BLD AUTO: 4.38 M/UL (ref 3.8–5.2)
RBC MORPH BLD: ABNORMAL
SERVICE CMNT-IMP: ABNORMAL
SODIUM SERPL-SCNC: 142 MMOL/L (ref 136–145)
WBC # BLD AUTO: 5.5 K/UL (ref 3.6–11)

## 2018-06-08 PROCEDURE — 82962 GLUCOSE BLOOD TEST: CPT

## 2018-06-08 PROCEDURE — 74011250637 HC RX REV CODE- 250/637: Performed by: INTERNAL MEDICINE

## 2018-06-08 PROCEDURE — 65660000000 HC RM CCU STEPDOWN

## 2018-06-08 PROCEDURE — 76700 US EXAM ABDOM COMPLETE: CPT

## 2018-06-08 PROCEDURE — 74011250637 HC RX REV CODE- 250/637: Performed by: EMERGENCY MEDICINE

## 2018-06-08 PROCEDURE — 97162 PT EVAL MOD COMPLEX 30 MIN: CPT

## 2018-06-08 PROCEDURE — 36415 COLL VENOUS BLD VENIPUNCTURE: CPT

## 2018-06-08 PROCEDURE — G8987 SELF CARE CURRENT STATUS: HCPCS

## 2018-06-08 PROCEDURE — G8979 MOBILITY GOAL STATUS: HCPCS

## 2018-06-08 PROCEDURE — 97165 OT EVAL LOW COMPLEX 30 MIN: CPT

## 2018-06-08 PROCEDURE — G8988 SELF CARE GOAL STATUS: HCPCS

## 2018-06-08 PROCEDURE — 70450 CT HEAD/BRAIN W/O DYE: CPT

## 2018-06-08 PROCEDURE — 83735 ASSAY OF MAGNESIUM: CPT

## 2018-06-08 PROCEDURE — 85025 COMPLETE CBC W/AUTO DIFF WBC: CPT

## 2018-06-08 PROCEDURE — 74011250636 HC RX REV CODE- 250/636: Performed by: EMERGENCY MEDICINE

## 2018-06-08 PROCEDURE — G8989 SELF CARE D/C STATUS: HCPCS

## 2018-06-08 PROCEDURE — G8978 MOBILITY CURRENT STATUS: HCPCS

## 2018-06-08 PROCEDURE — 80048 BASIC METABOLIC PNL TOTAL CA: CPT

## 2018-06-08 RX ORDER — POTASSIUM CHLORIDE 750 MG/1
40 TABLET, FILM COATED, EXTENDED RELEASE ORAL ONCE
Status: COMPLETED | OUTPATIENT
Start: 2018-06-08 | End: 2018-06-08

## 2018-06-08 RX ORDER — LANOLIN ALCOHOL/MO/W.PET/CERES
400 CREAM (GRAM) TOPICAL DAILY
Status: DISCONTINUED | OUTPATIENT
Start: 2018-06-09 | End: 2018-06-10 | Stop reason: HOSPADM

## 2018-06-08 RX ORDER — CLONIDINE HYDROCHLORIDE 0.1 MG/1
0.2 TABLET ORAL 2 TIMES DAILY
Status: DISCONTINUED | OUTPATIENT
Start: 2018-06-08 | End: 2018-06-09

## 2018-06-08 RX ORDER — LORAZEPAM 1 MG/1
1 TABLET ORAL
Status: DISCONTINUED | OUTPATIENT
Start: 2018-06-08 | End: 2018-06-10 | Stop reason: HOSPADM

## 2018-06-08 RX ORDER — MAGNESIUM SULFATE HEPTAHYDRATE 40 MG/ML
2 INJECTION, SOLUTION INTRAVENOUS ONCE
Status: COMPLETED | OUTPATIENT
Start: 2018-06-08 | End: 2018-06-08

## 2018-06-08 RX ADMIN — CLONIDINE HYDROCHLORIDE 0.2 MG: 0.1 TABLET ORAL at 13:19

## 2018-06-08 RX ADMIN — FERROUS SULFATE TAB 325 MG (65 MG ELEMENTAL FE) 325 MG: 325 (65 FE) TAB at 08:51

## 2018-06-08 RX ADMIN — OLANZAPINE 10 MG: 10 TABLET, FILM COATED ORAL at 18:07

## 2018-06-08 RX ADMIN — PANTOPRAZOLE SODIUM 40 MG: 40 TABLET, DELAYED RELEASE ORAL at 08:51

## 2018-06-08 RX ADMIN — FLUTICASONE FUROATE AND VILANTEROL TRIFENATATE 1 PUFF: 200; 25 POWDER RESPIRATORY (INHALATION) at 08:52

## 2018-06-08 RX ADMIN — MULTIPLE VITAMINS W/ MINERALS TAB 1 TABLET: TAB at 08:51

## 2018-06-08 RX ADMIN — Medication 1 CAPSULE: at 08:51

## 2018-06-08 RX ADMIN — Medication 100 MG: at 08:51

## 2018-06-08 RX ADMIN — CLONIDINE HYDROCHLORIDE 0.2 MG: 0.1 TABLET ORAL at 18:07

## 2018-06-08 RX ADMIN — POTASSIUM CHLORIDE 40 MEQ: 750 TABLET, EXTENDED RELEASE ORAL at 13:20

## 2018-06-08 RX ADMIN — Medication 5 ML: at 14:00

## 2018-06-08 RX ADMIN — VERAPAMIL HYDROCHLORIDE 120 MG: 120 TABLET, FILM COATED, EXTENDED RELEASE ORAL at 08:51

## 2018-06-08 RX ADMIN — FERROUS SULFATE TAB 325 MG (65 MG ELEMENTAL FE) 325 MG: 325 (65 FE) TAB at 18:07

## 2018-06-08 RX ADMIN — MAGNESIUM SULFATE HEPTAHYDRATE 2 G: 40 INJECTION, SOLUTION INTRAVENOUS at 13:23

## 2018-06-08 RX ADMIN — FOLIC ACID 1 MG: 1 TABLET ORAL at 08:51

## 2018-06-08 RX ADMIN — Medication 10 ML: at 06:00

## 2018-06-08 NOTE — PROGRESS NOTES
Hospitalist Progress Note    NAME: Carmen Plascencia   :  1975   MRN:  [id number]       Assessment / Plan:  Alcohol withdrawal vs cocaine overdose  Poly substance drug abuse  Cont on tele  CIWa with prn aitvan, appears to have needed any today, will change to prn po dosing  Banana bag, changed to po vit, taking pos  Counseled cessation   UDS positive for cocaine and Opiods- no bb  Resumed home antipsychotic, odd affect, avoid oversedation with ativan unless needed for DTs  CM has given substance abuse help resources    Unsteady gait  Will get head CT to eval, consider MRI, may be alcohol related  Cont PT     Iron deficiency Anemia  Suspect due to Menorrhagia (reported by pt heavy menses)  Thrombocytopenia, improving   +fibroids on pelvic US, Iron Profile, Ferritin, B12, folate c/w Iron def anemia, ferritin of 10  Iron per heme  S/p transfusion, h/h better, follow  Hematology appreciated  1/3 heme+stool for occult blood  cont ppi  -appreciate GI eval, plans for op egd/colon  -us neg, borderline CBD size, no stones  -will need op gyn f/u as well    chronic diarrhea- stool studies, gi consult, cont probiotic     hypok- supp po and recheck in am  hypomag- supp and follow     Cocaine induced chest pain night before admission, resolved  Tn negative     HTN, uncontrolled  PRN clonidine    resume PO meds, verapamil, follow  No bb with hx of cocaine  No ace/arb 2nd to allergy   Start scheduled clonidine today     suspected UTI, POA   -urine cultures-p GNRs--> e coli  IV Rocephin s/p 3 days today, dc abx     Type II diabetes mellitus  SSI for now       Asthma   Continue Advair, PRN nebs     Code Status: Full  Surrogate Decision Maker:      DVT Prophylaxis: SCDs, anemia   Baseline: multi drug abuse    Prophylaxis: SCD's  Recommended Disposition: Home w/Family ?home health follow bp/PT     Subjective:     Chief Complaint / Reason for Physician Visit  Shaky/sweaty     Patient still says she feels a little shaky but better than when she came in. Denies any shortness of breath or chest pain. Her  is at the bedside and says that she is a little sleepier and he thinks it is the medicine that she is getting. She is getting ready to eat lunch. Denies any nausea vomiting or abdominal pain. Has had frequent stools but no melena or blood in her stool and is not currently on her period. Patient denies ever having had an EGD or colonoscopy. 6/8 pt feels better. Denies sob/cp. Still loose stools. Says she does not want to drink anymore. Per  somewhat off balance when she walks, this is new for her. She normally drinks 4-5 24oz fortified beers/day. She deneis feeling shaky today. Patient was evaluated at 12:45pm      Discussed with RN events overnight. Review of Systems:  Symptom Y/N Comments  Symptom Y/N Comments   Fever/Chills    Chest Pain n    Poor Appetite    Edema     Cough    Abdominal Pain n    Sputum    Joint Pain     SOB/ZHANG n   Pruritis/Rash     Nausea/vomit n   Tolerating PT/OT     Diarrhea    Tolerating Diet     Constipation    Other       Could NOT obtain due to:      Objective:     VITALS:   Last 24hrs VS reviewed since prior progress note.  Most recent are:  Patient Vitals for the past 24 hrs:   Temp Pulse Resp BP SpO2   06/08/18 1442 98.6 °F (37 °C) 74 20 145/90 98 %   06/08/18 1103 98.9 °F (37.2 °C) 76 24 (!) 147/93 97 %   06/08/18 0734 98.5 °F (36.9 °C) 75 20 (!) 162/95 98 %   06/08/18 0254 98.9 °F (37.2 °C) 81 18 145/86 99 %   06/07/18 2304 96.8 °F (36 °C) 78 18 (!) 162/92 97 %   06/07/18 1927 98.7 °F (37.1 °C) 76 18 (!) 176/101 99 %       Intake/Output Summary (Last 24 hours) at 06/08/18 1623  Last data filed at 06/08/18 5966   Gross per 24 hour   Intake              240 ml   Output              500 ml   Net             -260 ml        PHYSICAL EXAM:  Patient is awake and alert, flat affect, oriented ×3 on room air no distress conjunctiva are pink oropharynx mucous membranes are moist cardiovascular regular rate no murmurs rubs or gallops lungs are clear without wheezes rhonchi or crackles somewhat decreased in the bases abdomen is obese bowel sounds present soft nontender extremities no clubbing cyanosis or edema, minimal tremor, no asterixis. f to n B slow, heel to shin ok, nonfocal exam      Reviewed most current lab test results and cultures  YES  Reviewed most current radiology test results   YES  Review and summation of old records today    NO  Reviewed patient's current orders and MAR    YES  PMH/SH reviewed - no change compared to H&P  ________________________________________________________________________  Care Plan discussed with:    Comments   Patient y    Family  y    RN y    Care Manager y    Consultant                        Multidiciplinary team rounds were held today with , nursing, pharmacist and clinical coordinator. Patient's plan of care was discussed; medications were reviewed and discharge planning was addressed. ________________________________________________________________________  Total NON critical care TIME:   Minutes    Total CRITICAL CARE TIME Spent:   Minutes non procedure based      Comments   >50% of visit spent in counseling and coordination of care     ________________________________________________________________________  Yaya Emerson MD     Procedures: see electronic medical records for all procedures/Xrays and details which were not copied into this note but were reviewed prior to creation of Plan. LABS:  I reviewed today's most current labs and imaging studies.   Pertinent labs include:  Recent Labs      06/08/18   0317  06/07/18   0542  06/06/18   0236   WBC  5.5  5.0  4.0   HGB  8.6*  8.7*  6.7*   HCT  31.0*  30.6*  25.3*   PLT  129*  95*  88*     Recent Labs      06/08/18   0317  06/07/18   0542  06/06/18   0236  06/05/18   1653   NA  142  139  137  136   K  3.4*  3.2*  3.3*  3.9   CL  111*  108  105  101   CO2  21  23  24  25 GLU  131*  102*  104*  127*   BUN  9  10  7  5*   CREA  0.80  0.84  0.90  0.88   CA  8.2*  8.4*  8.7  9.2   MG  1.5*  1.7  1.7  1.4*   PHOS   --   3.8   --    --    ALB   --   3.1*  3.3*  3.9   TBILI   --   1.3*  1.8*  2.1*   SGOT   --   98*  65*  97*   ALT   --   44  41  52   INR   --    --    --   1.2*       Signed: Emily Matute MD

## 2018-06-08 NOTE — PROGRESS NOTES
Hematology Oncology Progress Note    Follow up for: Anemia and thrombocytopenia    Chart notes reviewed since last visit. Case discussed with following:    Patient complains of the following: Denies pain, says they found blood in her stool, denies BRBPR or tarry stools.     Additional concerns noted by the staff:     Patient Vitals for the past 24 hrs:   BP Temp Pulse Resp SpO2   06/08/18 1103 (!) 147/93 98.9 °F (37.2 °C) 76 24 97 %   06/08/18 0734 (!) 162/95 98.5 °F (36.9 °C) 75 20 98 %   06/08/18 0254 145/86 98.9 °F (37.2 °C) 81 18 99 %   06/07/18 2304 (!) 162/92 96.8 °F (36 °C) 78 18 97 %   06/07/18 1927 (!) 176/101 98.7 °F (37.1 °C) 76 18 99 %   06/07/18 1618 163/90 97.8 °F (36.6 °C) 84 18 98 %   06/07/18 1458 (!) 163/105 97.8 °F (36.6 °C) 88 20 98 %       Review of Systems:  12 point ROS done and negative except as above    Physical Examination:  Gen NAD, morbidly obese female  CV rrr, no m/r/g  Lungs clear  abd benign, obese  Skin: No rash  Heent: NCAT, anicteric sclera, no thryromegaly, no thrush    Labs:  Recent Results (from the past 24 hour(s))   GLUCOSE, POC    Collection Time: 06/07/18  5:15 PM   Result Value Ref Range    Glucose (POC) 129 (H) 65 - 100 mg/dL    Performed by Shaun Gerber (PCT)    GLUCOSE, POC    Collection Time: 06/07/18  8:38 PM   Result Value Ref Range    Glucose (POC) 90 65 - 100 mg/dL    Performed by STEPHANIE GARDINER(CON)    CBC WITH AUTOMATED DIFF    Collection Time: 06/08/18  3:17 AM   Result Value Ref Range    WBC 5.5 3.6 - 11.0 K/uL    RBC 4.38 3.80 - 5.20 M/uL    HGB 8.6 (L) 11.5 - 16.0 g/dL    HCT 31.0 (L) 35.0 - 47.0 %    MCV 70.8 (L) 80.0 - 99.0 FL    MCH 19.6 (L) 26.0 - 34.0 PG    MCHC 27.7 (L) 30.0 - 36.5 g/dL    RDW 24.2 (H) 11.5 - 14.5 %    PLATELET 507 (L) 998 - 400 K/uL    MPV ABNORMAL 8.9 - 12.9 FL    NRBC 0.4 (H) 0  WBC    ABSOLUTE NRBC 0.02 (H) 0.00 - 0.01 K/uL    NEUTROPHILS 61 32 - 75 %    LYMPHOCYTES 28 12 - 49 %    MONOCYTES 7 5 - 13 %    EOSINOPHILS 3 0 - 7 %    BASOPHILS 0 0 - 1 %    IMMATURE GRANULOCYTES 1 (H) 0.0 - 0.5 %    ABS. NEUTROPHILS 3.3 1.8 - 8.0 K/UL    ABS. LYMPHOCYTES 1.5 0.8 - 3.5 K/UL    ABS. MONOCYTES 0.4 0.0 - 1.0 K/UL    ABS. EOSINOPHILS 0.2 0.0 - 0.4 K/UL    ABS. BASOPHILS 0.0 0.0 - 0.1 K/UL    ABS. IMM. GRANS. 0.1 (H) 0.00 - 0.04 K/UL    DF AUTOMATED      RBC COMMENTS ANISOCYTOSIS  3+        RBC COMMENTS MACROCYTOSIS  1+        RBC COMMENTS MICROCYTOSIS  2+        RBC COMMENTS HYPOCHROMIA  1+        RBC COMMENTS TARGET CELLS  PRESENT       METABOLIC PANEL, BASIC    Collection Time: 06/08/18  3:17 AM   Result Value Ref Range    Sodium 142 136 - 145 mmol/L    Potassium 3.4 (L) 3.5 - 5.1 mmol/L    Chloride 111 (H) 97 - 108 mmol/L    CO2 21 21 - 32 mmol/L    Anion gap 10 5 - 15 mmol/L    Glucose 131 (H) 65 - 100 mg/dL    BUN 9 6 - 20 MG/DL    Creatinine 0.80 0.55 - 1.02 MG/DL    BUN/Creatinine ratio 11 (L) 12 - 20      GFR est AA >60 >60 ml/min/1.73m2    GFR est non-AA >60 >60 ml/min/1.73m2    Calcium 8.2 (L) 8.5 - 10.1 MG/DL   MAGNESIUM    Collection Time: 06/08/18  3:17 AM   Result Value Ref Range    Magnesium 1.5 (L) 1.6 - 2.4 mg/dL   GLUCOSE, POC    Collection Time: 06/08/18  7:39 AM   Result Value Ref Range    Glucose (POC) 111 (H) 65 - 100 mg/dL    Performed by Sally Santana (PCT)      US abd  No splenomegaly. No gallstones. Borderline large CBD, nonspecific    Assessment and Plan:   Anemia and thrombocytopenia  -HBG better after blood and stable today. -PLTs improving, could be low due to UTI, vs etoh  -Probably combination of CELESTINE due to heavy periods and her ETOH use, no splenomegaly on US, no sign of hemolysis, smear unremarkable.  -We started her on oral iron.   -she should f/u with GYN about her fibroids  -monitor    Please call on call partner over the weekend with any questions, will not plan to see over weekend unless questions arise. We will see her on Monday if she remains admitted.      UTI  -urine cx with Gm negative rods  -She is logan Torres per hospitalist

## 2018-06-08 NOTE — PROGRESS NOTES
Problem: Mobility Impaired (Adult and Pediatric)  Goal: *Acute Goals and Plan of Care (Insert Text)  Physical Therapy Goals  Initiated 6/8/2018  1. Patient will move from supine to sit and sit to supine , scoot up and down and roll side to side in bed with independence within 7 day(s). 2.  Patient will transfer from bed to chair and chair to bed with independence using the least restrictive device within 7 day(s). 3.  Patient will perform sit to stand with independence within 7 day(s). 4.  Patient will ambulate with independence for 200 feet with the least restrictive device within 7 day(s). 5.  Patient will ascend/descend 3 stairs with 0 handrail(s) with independence within 7 day(s). physical Therapy EVALUATION  Patient: Urmila Bain (48 y.o. female)  Date: 6/8/2018  Primary Diagnosis: Alcohol withdrawal ([facility] [address]        Precautions:        ASSESSMENT :  Based on the objective data described below, the patient presents with impaired functional mobility secondary to mild standing balance and gait impairments, generalized weakness, mild coordination impairments, increased drowsiness, and decreased activity tolerance following admission for ETOH withdrawal. Pt received supine in bed with  at bedside and agreeable to PT evaluation. Pt cleared by nursing for mobility. Pt with increased drowsiness, however remained appropriate to mobilize short distance in room. Bed mobility performed with supervision and sit<>stand transfers performed with SBA. She was able to ambulate 35 ft in room with CGA, demonstrating increased trunk sway, widened LEO, and mildly unsteady gait throughout. Pt without overt LOB, however did reach out occasionally for support. She was able to mobilize with supervision overall in the bathroom. Pt was returned to bed and further mobility deferred due to increased drowsiness, however pt without any other complaints.  Pt was left supine in bed with all needs met,  present, bed alarm on, and RN aware following therapy session. Encouraged pt to sit up 3x/day during hospitalization to improve strength. Discharge recommendations TBD based on progress in acute PT, however anticipate pt will be able to return home with family support. PT will continue to follow to address mobility impairments as noted above. Patient will benefit from skilled intervention to address the above impairments. Patients rehabilitation potential is considered to be Fair  Factors which may influence rehabilitation potential include:   []         None noted  []         Mental ability/status  [x]         Medical condition  []         Home/family situation and support systems  [x]         Safety awareness  []         Pain tolerance/management  []         Other:      PLAN :  Recommendations and Planned Interventions:  [x]           Bed Mobility Training             []    Neuromuscular Re-Education  [x]           Transfer Training                   []    Orthotic/Prosthetic Training  [x]           Gait Training                         []    Modalities  [x]           Therapeutic Exercises           []    Edema Management/Control  [x]           Therapeutic Activities            [x]    Patient and Family Training/Education  []           Other (comment):    Frequency/Duration: Patient will be followed by physical therapy  3 times a week to address goals. Discharge Recommendations: To Be Determined  Further Equipment Recommendations for Discharge: TBD - anticipate none     SUBJECTIVE:   Patient stated I just really drowsy.     OBJECTIVE DATA SUMMARY:   HISTORY:    Past Medical History:   Diagnosis Date    Asthma     Diabetes ([facility] [address]     Hypertension     Psychiatric disorder     depression    Sleep apnea      Past Surgical History:   Procedure Laterality Date    HX TUBAL LIGATION       Prior Level of Function/Home Situation: Pt is independent for mobility and ADLs at baseline. Lives with .  Denies fall history. Personal factors and/or comorbidities impacting plan of care: DM; HTN; polysubstance abuse    Home Situation  Home Environment: Apartment  # Steps to Enter: 3  Rails to Enter: No  One/Two Story Residence: One story  Living Alone: No  Support Systems: Child(nica), Spouse/Significant Other/Partner  Patient Expects to be Discharged to[de-identified] Private residence  Current DME Used/Available at Home: None  Tub or Shower Type: Tub/Shower combination    EXAMINATION/PRESENTATION/DECISION MAKING:   Critical Behavior:  Neurologic State: Drowsy  Orientation Level: Oriented X4  Cognition: Follows commands  Safety/Judgement: Fall prevention  Hearing: Auditory  Auditory Impairment: None  Skin:  Intact  Edema: None  Range Of Motion:  AROM: Within functional limits           PROM: Within functional limits           Strength:    Strength: Generally decreased, functional                    Tone & Sensation:   Tone: Normal              Sensation: Intact               Coordination:  Coordination: Generally decreased, functional  Vision:      Functional Mobility:  Bed Mobility:  Rolling: Supervision  Supine to Sit: Supervision  Sit to Supine: Supervision  Scooting: Supervision  Transfers:  Sit to Stand: Stand-by assistance  Stand to Sit: Stand-by assistance                       Balance:   Sitting: Intact  Standing: Impaired; Without support  Standing - Static: Good  Standing - Dynamic : Good  Ambulation/Gait Training:  Distance (ft): 34 Feet (ft)  Assistive Device: Gait belt  Ambulation - Level of Assistance: Contact guard assistance;Assist x1;Additional time     Gait Description (WDL): Exceptions to WDL  Gait Abnormalities: Decreased step clearance;Trunk sway increased; Path deviations        Base of Support: Widened     Speed/Radha: Pace decreased (<100 feet/min)  Step Length: Left shortened;Right shortened    Functional Measure:  Barthel Index:    Bathin  Bladder: 10  Bowels: 10  Groomin  Dressing: 10  Feeding: 10  Mobility: 5  Stairs: 5  Toilet Use: 5  Transfer (Bed to Chair and Back): 10  Total: 70       Barthel and G-code impairment scale:  Percentage of impairment CH  0% CI  1-19% CJ  20-39% CK  40-59% CL  60-79% CM  80-99% CN  100%   Barthel Score 0-100 100 99-80 79-60 59-40 20-39 1-19   0   Barthel Score 0-20 20 17-19 13-16 9-12 5-8 1-4 0      The Barthel ADL Index: Guidelines  1. The index should be used as a record of what a patient does, not as a record of what a patient could do. 2. The main aim is to establish degree of independence from any help, physical or verbal, however minor and for whatever reason. 3. The need for supervision renders the patient not independent. 4. A patient's performance should be established using the best available evidence. Asking the patient, friends/relatives and nurses are the usual sources, but direct observation and common sense are also important. However direct testing is not needed. 5. Usually the patient's performance over the preceding 24-48 hours is important, but occasionally longer periods will be relevant. 6. Middle categories imply that the patient supplies over 50 per cent of the effort. 7. Use of aids to be independent is allowed. Fabiola Aranda., Barthel, DValorieW. (7855). Functional evaluation: the Barthel Index. [address] [facility] (14)[address] Louis josefina JOSE DANIEL Leon, Merritt Loyd., Shweta Arellano, Jaylon Hoover, [address] [place] [address] (1999). Measuring the change indisability after inpatient rehabilitation; comparison of the responsiveness of the Barthel Index and Functional [place] Measure. Journal of Neurology, Neurosurgery, and Psychiatry, 66(4), 318-944. Bertrand Mora, N.J.A, ABE CorleyJ.JOHN, & Luis Miguel Ruvalcaba, M.A. (2004.) Assessment of post-stroke quality of life in cost-effectiveness studies: The usefulness of the Barthel Index and the EuroQoL-5D. Quality of Life Research, 13, 546-89       G codes:   In compliance with CMSs Claims Based Outcome Reporting, the following G-code set was chosen for this patient based on their primary functional limitation being treated: The outcome measure chosen to determine the severity of the functional limitation was the Barthel index with a score of 70/100 which was correlated with the impairment scale. ? Mobility - Walking and Moving Around:     - CURRENT STATUS: CJ - 20%-39% impaired, limited or restricted    - GOAL STATUS: CI - 1%-19% impaired, limited or restricted    - D/C STATUS:  ---------------To be determined---------------      Physical Therapy Evaluation Charge Determination   History Examination Presentation Decision-Making   HIGH Complexity :3+ comorbidities / personal factors will impact the outcome/ POC  HIGH Complexity : 4+ Standardized tests and measures addressing body structure, function, activity limitation and / or participation in recreation  MEDIUM Complexity : Evolving with changing characteristics  Other outcome measures Barthel Index  MEDIUM      Based on the above components, the patient evaluation is determined to be of the following complexity level: MEDIUM    Pain:  Pain Scale 1: Numeric (0 - 10)  Pain Intensity 1: 0              Activity Tolerance:   Fair - increased drowsiness, which limited mobility, otherwise pt without complaints  Please refer to the flowsheet for vital signs taken during this treatment. After treatment:   []         Patient left in no apparent distress sitting up in chair  [x]         Patient left in no apparent distress in bed  [x]         Call bell left within reach  [x]         Nursing notified  [x]         Caregiver present  [x]         Bed alarm activated    COMMUNICATION/EDUCATION:   The patients plan of care was discussed with: Physical Therapist, Occupational Therapist and Registered Nurse. [x]         Fall prevention education was provided and the patient/caregiver indicated understanding.   [x]         Patient/family have participated as able in goal setting and plan of care.  [x]         Patient/family agree to work toward stated goals and plan of care. []         Patient understands intent and goals of therapy, but is neutral about his/her participation. []         Patient is unable to participate in goal setting and plan of care.     Thank you for this referral.  Bry Salas, PT, DPT   Time Calculation: 15 mins

## 2018-06-08 NOTE — PROGRESS NOTES
Reason for Admission:   Pt was admitted on 6/6/18 d/t a Dx: of Alchol Withdrawal vs Cocaine Overdose. Iron deficiency anemia. HTN. Possible UTI.  T2DM. Asthma. Pt is Full Code. RRAT Score:     11                Plan for utilizing home health:      Not indicated at this time. Likelihood of Readmission:  LOW                         Transition of Care Plan:                  Pt is 39 y/o  [place] female that lives with her significant other, Shyrl Else in first floor apartment with 3 CHAD. CM attempted to meet with the Pt but she was very lethargic for the interview. CM attempted to call the significant other but had to leave a VM. CM left information about Substance Abuse Resources in the Pt room. Pharmacy is [facility] on [email or web address]. CM will continue to monitor discharge plan. Care Management Interventions  PCP Verified by CM: Yes  Palliative Care Criteria Met (RRAT>21 & CHF Dx)?: No  Mode of Transport at Discharge:  Other (see comment)  Transition of Care Consult (CM Consult): Discharge Planning  MyChart Signup: No  Discharge Durable Medical Equipment: No  Physical Therapy Consult: Yes  Occupational Therapy Consult: Yes  Speech Therapy Consult: No  Current Support Network: Lives with Spouse, Own Home  Confirm Follow Up Transport: Family  Plan discussed with Pt/Family/Caregiver: Yes  Freedom of Choice Offered: Yes  Discharge Location  Discharge Placement: Home with family assistance    [address]

## 2018-06-08 NOTE — PROGRESS NOTES
SHIFT SUMMARY:    0700: Bedside report received from Edgard Flynn RN. Assumed patient care. 1900: Bedside report given to esau nurse. [facility] NURSING NOTE   Admission Date 6/5/2018   Admission Diagnosis Alcohol withdrawal ([address]   Consults IP CONSULT TO HEMATOLOGY  IP CONSULT TO GASTROENTEROLOGY      Cardiac Monitoring [x] Yes [] No      Purposeful Hourly Rounding [x] Yes    Jeovany Score Total Score: 3   Jeovany score 3 or > [x] Bed Alarm [] Avasys [] 1:1 sitter [] Patient refused (Signed refusal form in chart)   Rosalio Score Rosalio Score: 18   Rosalio score 14 or < [] PMT consult [] Wound Care consult    []  Specialty bed  [] Nutrition consult      Influenza Vaccine Received Flu Vaccine for Current Season (usually Sept-March): Not Flu Season           Oxygen needs? [x] Room air Oxygen @  []1L    []2L    []3L   []4L    []5L   []6L via  NC   Chronic home O2 use? [] Yes [] No  Perform O2 challenge test and document in progress note using [employer])      Last bowel movement Last Bowel Movement Date: 06/08/18      Urinary Catheter             LDAs               Peripheral IV 06/06/18 Left Forearm (Active)   Site Assessment Clean, dry, & intact 6/8/2018  4:10 PM   Phlebitis Assessment 0 6/8/2018  4:10 PM   Infiltration Assessment 0 6/8/2018  4:10 PM   Dressing Status Clean, dry, & intact 6/8/2018  4:10 PM   Dressing Type Tape;Transparent 6/8/2018  4:10 PM   Hub Color/Line Status Pink; Infusing;Patent 6/8/2018  4:10 PM   Action Taken Other (comment) 6/6/2018  8:00 PM   Alcohol Cap Used No 6/7/2018  7:30 PM                         Readmission Risk Assessment Tool Score Low Risk            11       Total Score        2 . Living with Significant Other. Assisted Living. LTAC. SNF. or   Rehab    4 IP Visits Last 12 Months (1-3=4, 4=9, >4=11)    4 Pt.  Coverage (Medicare=5 , Medicaid, or Self-Pay=4)    1 Charlson Comorbidity Score (Age + Comorbid Conditions)        Criteria that do not apply:    Has Seen PCP in Last 6 Months (Yes=3, No=0)    Patient Length of Stay (>5 days = 3)       Expected Length of Stay 3d 9h   Actual Length of Stay 2

## 2018-06-08 NOTE — PROGRESS NOTES
Gastroenterology Progress Note    6/8/2018    Admit Date: 6/5/2018    Subjective: Follow up for: anemia, alcoholism, diarrhea      Some diarrhea with no blood in it. C diff in stool is pending. Wanting to know when she can go home.     Current Facility-Administered Medications   Medication Dose Route Frequency    ferrous sulfate tablet 325 mg  1 Tab Oral BID WITH MEALS    OLANZapine (ZyPREXA) tablet 10 mg  10 mg Oral QPM    folic acid (FOLVITE) tablet 1 mg  1 mg Oral DAILY    multivitamin, tx-iron-ca-min (THERA-M w/ IRON) tablet 1 Tab  1 Tab Oral DAILY    hydrOXYzine pamoate (VISTARIL) capsule 25 mg  25 mg Oral TID PRN    lactobac ac& pc-s.therm-b.anim (MAYCO Q/RISAQUAD)  1 Cap Oral DAILY    Thiamine Mononitrate (B-1) tablet 100 mg  100 mg Oral DAILY    pantoprazole (PROTONIX) tablet 40 mg  40 mg Oral ACB    levalbuterol (XOPENEX) nebulizer soln 1.25 mg/3 mL  1.25 mg Nebulization Q6H PRN    fluticasone-vilanterol (BREO ELLIPTA) 200mcg-25mcg/puff  1 Puff Inhalation DAILY    cloNIDine HCl (CATAPRES) tablet 0.2 mg  0.2 mg Oral Q6H PRN    insulin lispro (HUMALOG) injection   SubCUTAneous AC&HS    glucose chewable tablet 16 g  4 Tab Oral PRN    dextrose (D50W) injection syrg 12.5-25 g  12.5-25 g IntraVENous PRN    glucagon (GLUCAGEN) injection 1 mg  1 mg IntraMUSCular PRN    LORazepam (ATIVAN) injection 2 mg  2 mg IntraVENous Q1H PRN    LORazepam (ATIVAN) injection 4 mg  4 mg IntraVENous Q1H PRN    cefTRIAXone (ROCEPHIN) 1 g in 0.9% sodium chloride (MBP/ADV) 50 mL  1 g IntraVENous Q24H    0.9% sodium chloride infusion 250 mL  250 mL IntraVENous PRN    0.9% sodium chloride infusion 250 mL  250 mL IntraVENous PRN    acetaminophen (TYLENOL) tablet 650 mg  650 mg Oral Q6H PRN    verapamil ER (CALAN-SR) tablet 120 mg  120 mg Oral DAILY    sodium chloride (NS) flush 5-10 mL  5-10 mL IntraVENous Q8H    sodium chloride (NS) flush 5-10 mL  5-10 mL IntraVENous PRN        Objective:     Blood pressure (!) 147/93, pulse 76, temperature 98.9 °F (37.2 °C), resp. rate 24, height 5' 3\" (1.6 m), weight 113.4 kg (250 lb), SpO2 97 %, not currently breastfeeding. 06/08 0701 - 06/08 1900  In: 240 [P.O.:240]  Out: -     06/06 1901 - 06/08 0700  In: 1986.7 [P.O.:400; I.V.:1586.7]  Out: 700 [Urine:700]        Physical Examination:       General:AAO x 3,   HEENT:  EOMI,  Chest:  CTA,   Heart: S1, S2, RRR  GI: Soft, NT, ND + bowel sounds      Data Review    Recent Results (from the past 24 hour(s))   GLUCOSE, POC    Collection Time: 06/07/18  5:15 PM   Result Value Ref Range    Glucose (POC) 129 (H) 65 - 100 mg/dL    Performed by Pedro Lira (PCT)    GLUCOSE, POC    Collection Time: 06/07/18  8:38 PM   Result Value Ref Range    Glucose (POC) 90 65 - 100 mg/dL    Performed by STEPHANIE GARDINER(CON)    CBC WITH AUTOMATED DIFF    Collection Time: 06/08/18  3:17 AM   Result Value Ref Range    WBC 5.5 3.6 - 11.0 K/uL    RBC 4.38 3.80 - 5.20 M/uL    HGB 8.6 (L) 11.5 - 16.0 g/dL    HCT 31.0 (L) 35.0 - 47.0 %    MCV 70.8 (L) 80.0 - 99.0 FL    MCH 19.6 (L) 26.0 - 34.0 PG    MCHC 27.7 (L) 30.0 - 36.5 g/dL    RDW 24.2 (H) 11.5 - 14.5 %    PLATELET 856 (L) 968 - 400 K/uL    MPV ABNORMAL 8.9 - 12.9 FL    NRBC 0.4 (H) 0  WBC    ABSOLUTE NRBC 0.02 (H) 0.00 - 0.01 K/uL    NEUTROPHILS 61 32 - 75 %    LYMPHOCYTES 28 12 - 49 %    MONOCYTES 7 5 - 13 %    EOSINOPHILS 3 0 - 7 %    BASOPHILS 0 0 - 1 %    IMMATURE GRANULOCYTES 1 (H) 0.0 - 0.5 %    ABS. NEUTROPHILS 3.3 1.8 - 8.0 K/UL    ABS. LYMPHOCYTES 1.5 0.8 - 3.5 K/UL    ABS. MONOCYTES 0.4 0.0 - 1.0 K/UL    ABS. EOSINOPHILS 0.2 0.0 - 0.4 K/UL    ABS. BASOPHILS 0.0 0.0 - 0.1 K/UL    ABS. IMM.  GRANS. 0.1 (H) 0.00 - 0.04 K/UL    DF AUTOMATED      RBC COMMENTS ANISOCYTOSIS  3+        RBC COMMENTS MACROCYTOSIS  1+        RBC COMMENTS MICROCYTOSIS  2+        RBC COMMENTS HYPOCHROMIA  1+        RBC COMMENTS TARGET CELLS  PRESENT       METABOLIC PANEL, BASIC    Collection Time: 06/08/18 3: 17 AM   Result Value Ref Range    Sodium 142 136 - 145 mmol/L    Potassium 3.4 (L) 3.5 - 5.1 mmol/L    Chloride 111 (H) 97 - 108 mmol/L    CO2 21 21 - 32 mmol/L    Anion gap 10 5 - 15 mmol/L    Glucose 131 (H) 65 - 100 mg/dL    BUN 9 6 - 20 MG/DL    Creatinine 0.80 0.55 - 1.02 MG/DL    BUN/Creatinine ratio 11 (L) 12 - 20      GFR est AA >60 >60 ml/min/1.73m2    GFR est non-AA >60 >60 ml/min/1.73m2    Calcium 8.2 (L) 8.5 - 10.1 MG/DL   MAGNESIUM    Collection Time: 06/08/18  3:17 AM   Result Value Ref Range    Magnesium 1.5 (L) 1.6 - 2.4 mg/dL   GLUCOSE, POC    Collection Time: 06/08/18  7:39 AM   Result Value Ref Range    Glucose (POC) 111 (H) 65 - 100 mg/dL    Performed by Kermitt Schilder (PCT)    GLUCOSE, POC    Collection Time: 06/08/18 11:43 AM   Result Value Ref Range    Glucose (POC) 142 (H) 65 - 100 mg/dL    Performed by Kermitt Schilder (PCT)      Recent Labs      06/08/18   0317  06/07/18   0542   WBC  5.5  5.0   HGB  8.6*  8.7*   HCT  31.0*  30.6*   PLT  129*  95*     Recent Labs      06/08/18   0317  06/07/18   0542  06/06/18   0236   NA  142  139  137   K  3.4*  3.2*  3.3*   CL  111*  108  105   CO2  21  23  24   BUN  9  10  7   CREA  0.80  0.84  0.90   GLU  131*  102*  104*   CA  8.2*  8.4*  8.7   MG  1.5*  1.7  1.7   PHOS   --   3.8   --      Recent Labs      06/07/18   0542  06/06/18   0236  06/05/18   1653   SGOT  98*  65*  97*   AP  63  56  67   TP  7.0  7.2  8.4*   ALB  3.1*  3.3*  3.9   GLOB  3.9  3.9  4.5*     Recent Labs      06/06/18   1255  06/05/18   1653   INR   --   1.2*   PTP   --   11.9*   APTT  26.0   --       Recent Labs      06/06/18   0236   TIBC  358   PSAT  4*   FERR  10      Lab Results   Component Value Date/Time    Folate 18.6 06/06/2018 02:36 AM      No results for input(s): PH, PCO2, PO2 in the last 72 hours.   Recent Labs      06/05/18   2126   TROIQ  <0.04     No results found for: CHOL, CHOLX, CHLST, CHOLV, HDL, LDL, LDLC, DLDLP, TGLX, TRIGL, TRIGP, CHHD, CHHDX  No components found for: [place]  Lab Results   Component Value Date/Time    Color DARK YELLOW 06/05/2018 04:56 PM    Appearance CLOUDY (A) 06/05/2018 04:56 PM    Specific gravity 1.019 06/05/2018 04:56 PM    pH (UA) 7.0 06/05/2018 04:56 PM    Protein TRACE (A) 06/05/2018 04:56 PM    Glucose NEGATIVE  06/05/2018 04:56 PM    Ketone NEGATIVE  06/05/2018 04:56 PM    Bilirubin NEGATIVE  09/22/2017 10:24 AM    Urobilinogen 4.0 (H) 06/05/2018 04:56 PM    Nitrites POSITIVE (A) 06/05/2018 04:56 PM    Leukocyte Esterase MODERATE (A) 06/05/2018 04:56 PM    Epithelial cells FEW 06/05/2018 04:56 PM    Bacteria 3+ (A) 06/05/2018 04:56 PM    WBC 5-10 06/05/2018 04:56 PM    RBC 0-5 06/05/2018 04:56 PM        ROS: -CP, SOB, Dysuria, palpitations, cough. Assessment:    Active Problems:    Cocaine abuse (7/18/2017)      Polysubstance dependence ([facility] [address] (7/19/2017)      Alcohol withdrawal ([facility] [address] (6/6/2018)      HTN (hypertension) (6/6/2018)      Type II diabetes mellitus ([facility] [address] (6/6/2018)      Asthma (6/6/2018)      Anemia (6/6/2018)             Plan/Discussion:     1. US showed hepatic steatosis and CBD of 7 mm but no stones. 2. Hgb is stable. + uterine fibroids noted. Occult blood in stool will be worked up non urgently as an OP with EGD/colonoscopy. I will arrange it. 3. Diarrhea: work up pending. 4. Alcoholism: Tx as per protocol. Abstinence discussed. Her  was at bedside. Call partners available on request this weekend. If here are  pressing GI issues that cannot be managed medically please re consult as needed. Signed By: Ryan Reyes.  Jess Melgoza MD    6/8/2018  12:19 PM

## 2018-06-08 NOTE — PROGRESS NOTES
Occupational Therapy EVALUATION/discharge  Patient: Susana Llanos (89 y.o. female)  Date: 6/8/2018  Primary Diagnosis: Alcohol withdrawal ([facility] [address]        Precautions: fall       ASSESSMENT:   Based on the objective data described below, the patient presents with generalized weakness, decreased endurance, strength, functional mobility, decreased ADLs, ETOH and cocaine use. Pt was living at home with family and per pt and chart she was independent with ADLs and ILS. She was cleared to be seen for therapy and her family was present in room. Pt was very lethargic but was able to work with therapy. She was supervision for bed mobility, and able to stand with SBA to supervision. She did not use AD and her balance was good and transfers were supervision to mod I due to her lethargy. Pt was able to kanu her socks in sitting and states that she is able to bathe and dress, and BUE range is functional and strength is functional.  Pt has mild decreased coordination but feel this is due to her ETOH withdrawal.  Pt was put back to bed due to pt did not have a chair in her room and nursing notified. Recommend that pt return home with family and no further therapy needed at this time. .  Further skilled acute occupational therapy is not indicated at this time. Discharge Recommendations: None  Further Equipment Recommendations for Discharge: none      SUBJECTIVE:   Patient stated I am so tired.     OBJECTIVE DATA SUMMARY:   HISTORY:   Past Medical History:   Diagnosis Date    Asthma     Diabetes ([facility] [address]     Hypertension     Psychiatric disorder     depression    Sleep apnea      Past Surgical History:   Procedure Laterality Date    HX TUBAL LIGATION         Prior Level of Function/Environment/Context: pt lives with family and was independent in all ADLS and ILS with no AD per pt  Occupations in which the patient is/was successful, what are the barriers preventing that success: her ETOH and drug abuse  Expanded or extensive additional review of patient history:     Home Situation  Home Environment: Apartment  # Steps to Enter: 3  Rails to Enter: No  One/Two Story Residence: One story  Living Alone: No  Support Systems: Child(nica), Spouse/Significant Other/Partner  Patient Expects to be Discharged to[de-identified] Private residence  Current DME Used/Available at Home: None  Tub or Shower Type: Tub/Shower combination    Hand dominance: Right    EXAMINATION OF PERFORMANCE DEFICITS:  Cognitive/Behavioral Status:  Neurologic State: Drowsy  Orientation Level: Oriented X4  Cognition: Follows commands  Perception: Appears intact  Perseveration: No perseveration noted  Safety/Judgement: Fall prevention    Skin: in good health     Edema: none noted    Hearing: Auditory  Auditory Impairment: None    Vision/Perceptual:                 intact                    Range of Motion:    AROM: Within functional limits  PROM: Within functional limits                      Strength:    Strength: Generally decreased, functional                Coordination:  Coordination: Generally decreased, functional  Fine Motor Skills-Upper: Left Intact; Right Intact (functional, slightly impaired but may be due to lethargy)    Gross Motor Skills-Upper: Left Intact; Right Intact    Tone & Sensation:    Tone: Normal  Sensation: Intact                      Balance:  Sitting: Intact  Standing: Impaired; Without support  Standing - Static: Good  Standing - Dynamic : Good    Functional Mobility and Transfers for ADLs:  Bed Mobility:  Rolling: Supervision  Supine to Sit: Supervision  Sit to Supine: Supervision  Scooting: Supervision    Transfers:  Sit to Stand: Stand-by assistance  Stand to Sit: Stand-by assistance    ADL Assessment:  Feeding: Setup    Oral Facial Hygiene/Grooming: Setup    Bathing: Contact guard assistance;Setup    Upper Body Dressing: Setup;Contact guard assistance    Lower Body Dressing: Contact guard assistance;Setup    Toileting: Contact guard assistance;Stand by assistance           Cognitive Retraining  Safety/Judgement: Fall prevention         Functional Measure:  Barthel Index:    Bathin  Bladder: 10  Bowels: 10  Groomin  Dressing: 10  Feeding: 10  Mobility: 5  Stairs: 5  Toilet Use: 5  Transfer (Bed to Chair and Back): 10  Total: 70       Barthel and G-code impairment scale:  Percentage of impairment CH  0% CI  1-19% CJ  20-39% CK  40-59% CL  60-79% CM  80-99% CN  100%   Barthel Score 0-100 100 99-80 79-60 59-40 20-39 1-19   0   Barthel Score 0-20 20 17-19 13-16 9-12 5-8 1-4 0      The Barthel ADL Index: Guidelines  1. The index should be used as a record of what a patient does, not as a record of what a patient could do. 2. The main aim is to establish degree of independence from any help, physical or verbal, however minor and for whatever reason. 3. The need for supervision renders the patient not independent. 4. A patient's performance should be established using the best available evidence. Asking the patient, friends/relatives and nurses are the usual sources, but direct observation and common sense are also important. However direct testing is not needed. 5. Usually the patient's performance over the preceding 24-48 hours is important, but occasionally longer periods will be relevant. 6. Middle categories imply that the patient supplies over 50 per cent of the effort. 7. Use of aids to be independent is allowed. Adrian Hernandez., Barthel, D.W. (4451). Functional evaluation: the Barthel Index. [facility] (14)2. Saul Granados josefina JOSE DANIEL Leon, Dipak Levy., Slime Decker., [facility], [facility] (). Measuring the change indisability after inpatient rehabilitation; comparison of the responsiveness of the Barthel Index and Functional Peterson Measure. Journal of Neurology, Neurosurgery, and Psychiatry, 66(4), 934-697.   JAMES Bowling.NICK, KELBY Corley, & Karime Power MValorieA. (2004.) Assessment of post-stroke quality of life in cost-effectiveness studies: The usefulness of the Barthel Index and the EuroQoL-5D. Quality of Life Research, 13, 599-61         G codes: In compliance with CMSs Claims Based Outcome Reporting, the following G-code set was chosen for this patient based on their primary functional limitation being treated: The outcome measure chosen to determine the severity of the functional limitation was the Barthel with a score of 70/100 which was correlated with the impairment scale. ? Self Care:     - CURRENT STATUS: CJ - 20%-39% impaired, limited or restricted    - GOAL STATUS: CJ - 20%-39% impaired, limited or restricted    - D/C STATUS:  CJ - 20%-39% impaired, limited or restricted     Occupational Therapy Evaluation Charge Determination   History Examination Decision-Making   MEDIUM Complexity : Expanded review of history including physical, cognitive and psychosocial  history  LOW Complexity : 1-3 performance deficits relating to physical, cognitive , or psychosocial skils that result in activity limitations and / or participation restrictions  LOW Complexity : No comorbidities that affect functional and no verbal or physical assistance needed to complete eval tasks       Based on the above components, the patient evaluation is determined to be of the following complexity level: LOW   Pain:  Pain Scale 1: Numeric (0 - 10)  Pain Intensity 1: 0              Activity Tolerance:   vss  Please refer to the flowsheet for vital signs taken during this treatment. After treatment:   []  Patient left in no apparent distress sitting up in chair  [x]  Patient left in no apparent distress in bed  [x]  Call bell left within reach  [x]  Nursing notified  [x]  Caregiver present  []  Bed alarm activated    COMMUNICATION/EDUCATION:   Communication/Collaboration:  [x]      Home safety education was provided and the patient/caregiver indicated understanding.   [x]      Patient/family have participated as able and agree with findings and recommendations. []      Patient is unable to participate in plan of care at this time.   Findings and recommendations were discussed with: Physical Therapist, Registered Nurse and family    Vitor Medley OT  Time Calculation: 16 mins

## 2018-06-09 LAB
ANION GAP SERPL CALC-SCNC: 9 MMOL/L (ref 5–15)
BACTERIA SPEC CULT: ABNORMAL
BACTERIA SPEC CULT: ABNORMAL
BASOPHILS # BLD: 0.1 K/UL (ref 0–0.1)
BASOPHILS NFR BLD: 1 % (ref 0–1)
BUN SERPL-MCNC: 7 MG/DL (ref 6–20)
BUN/CREAT SERPL: 8 (ref 12–20)
C JEJUNI+C COLI AG STL QL: NEGATIVE
CALCIUM SERPL-MCNC: 8.4 MG/DL (ref 8.5–10.1)
CHLORIDE SERPL-SCNC: 111 MMOL/L (ref 97–108)
CO2 SERPL-SCNC: 20 MMOL/L (ref 21–32)
COPPER SERPL-MCNC: 115 UG/DL (ref 72–166)
CREAT SERPL-MCNC: 0.87 MG/DL (ref 0.55–1.02)
DIFFERENTIAL METHOD BLD: ABNORMAL
E COLI SXT1+2 STL IA: NEGATIVE
EOSINOPHIL # BLD: 0.1 K/UL (ref 0–0.4)
EOSINOPHIL NFR BLD: 2 % (ref 0–7)
ERYTHROCYTE [DISTWIDTH] IN BLOOD BY AUTOMATED COUNT: 24.8 % (ref 11.5–14.5)
GLUCOSE BLD STRIP.AUTO-MCNC: 101 MG/DL (ref 65–100)
GLUCOSE BLD STRIP.AUTO-MCNC: 122 MG/DL (ref 65–100)
GLUCOSE BLD STRIP.AUTO-MCNC: 130 MG/DL (ref 65–100)
GLUCOSE BLD STRIP.AUTO-MCNC: 144 MG/DL (ref 65–100)
GLUCOSE BLD STRIP.AUTO-MCNC: 151 MG/DL (ref 65–100)
GLUCOSE SERPL-MCNC: 109 MG/DL (ref 65–100)
HCT VFR BLD AUTO: 30.2 % (ref 35–47)
HGB BLD-MCNC: 8.3 G/DL (ref 11.5–16)
IMM GRANULOCYTES # BLD: 0.1 K/UL (ref 0–0.04)
IMM GRANULOCYTES NFR BLD AUTO: 1 % (ref 0–0.5)
LYMPHOCYTES # BLD: 1.8 K/UL (ref 0.8–3.5)
LYMPHOCYTES NFR BLD: 29 % (ref 12–49)
MAGNESIUM SERPL-MCNC: 1.9 MG/DL (ref 1.6–2.4)
MCH RBC QN AUTO: 20.1 PG (ref 26–34)
MCHC RBC AUTO-ENTMCNC: 27.5 G/DL (ref 30–36.5)
MCV RBC AUTO: 73.3 FL (ref 80–99)
MONOCYTES # BLD: 0.5 K/UL (ref 0–1)
MONOCYTES NFR BLD: 8 % (ref 5–13)
NEUTS SEG # BLD: 3.6 K/UL (ref 1.8–8)
NEUTS SEG NFR BLD: 59 % (ref 32–75)
NRBC # BLD: 0 K/UL (ref 0–0.01)
NRBC BLD-RTO: 0 PER 100 WBC
PLATELET # BLD AUTO: 153 K/UL (ref 150–400)
POTASSIUM SERPL-SCNC: 3.5 MMOL/L (ref 3.5–5.1)
RBC # BLD AUTO: 4.12 M/UL (ref 3.8–5.2)
RBC MORPH BLD: ABNORMAL
SERVICE CMNT-IMP: ABNORMAL
SODIUM SERPL-SCNC: 140 MMOL/L (ref 136–145)
WBC # BLD AUTO: 6.2 K/UL (ref 3.6–11)

## 2018-06-09 PROCEDURE — 74011250637 HC RX REV CODE- 250/637: Performed by: INTERNAL MEDICINE

## 2018-06-09 PROCEDURE — 85025 COMPLETE CBC W/AUTO DIFF WBC: CPT

## 2018-06-09 PROCEDURE — 36415 COLL VENOUS BLD VENIPUNCTURE: CPT

## 2018-06-09 PROCEDURE — 82962 GLUCOSE BLOOD TEST: CPT

## 2018-06-09 PROCEDURE — 80048 BASIC METABOLIC PNL TOTAL CA: CPT

## 2018-06-09 PROCEDURE — 74011636637 HC RX REV CODE- 636/637: Performed by: INTERNAL MEDICINE

## 2018-06-09 PROCEDURE — 83735 ASSAY OF MAGNESIUM: CPT

## 2018-06-09 PROCEDURE — 74011250637 HC RX REV CODE- 250/637: Performed by: EMERGENCY MEDICINE

## 2018-06-09 PROCEDURE — 65660000000 HC RM CCU STEPDOWN

## 2018-06-09 RX ORDER — CLONIDINE HYDROCHLORIDE 0.1 MG/1
0.3 TABLET ORAL 2 TIMES DAILY
Status: DISCONTINUED | OUTPATIENT
Start: 2018-06-09 | End: 2018-06-10 | Stop reason: HOSPADM

## 2018-06-09 RX ORDER — POTASSIUM CHLORIDE 750 MG/1
40 TABLET, FILM COATED, EXTENDED RELEASE ORAL ONCE
Status: COMPLETED | OUTPATIENT
Start: 2018-06-09 | End: 2018-06-09

## 2018-06-09 RX ADMIN — Medication 5 ML: at 14:00

## 2018-06-09 RX ADMIN — FOLIC ACID 1 MG: 1 TABLET ORAL at 09:17

## 2018-06-09 RX ADMIN — MULTIPLE VITAMINS W/ MINERALS TAB 1 TABLET: TAB at 09:17

## 2018-06-09 RX ADMIN — FERROUS SULFATE TAB 325 MG (65 MG ELEMENTAL FE) 325 MG: 325 (65 FE) TAB at 09:17

## 2018-06-09 RX ADMIN — Medication 100 MG: at 09:17

## 2018-06-09 RX ADMIN — Medication 400 MG: at 09:17

## 2018-06-09 RX ADMIN — LORAZEPAM 1 MG: 1 TABLET ORAL at 02:34

## 2018-06-09 RX ADMIN — CLONIDINE HYDROCHLORIDE 0.3 MG: 0.1 TABLET ORAL at 17:28

## 2018-06-09 RX ADMIN — Medication 1 CAPSULE: at 09:17

## 2018-06-09 RX ADMIN — INSULIN LISPRO 2 UNITS: 100 INJECTION, SOLUTION INTRAVENOUS; SUBCUTANEOUS at 17:28

## 2018-06-09 RX ADMIN — FERROUS SULFATE TAB 325 MG (65 MG ELEMENTAL FE) 325 MG: 325 (65 FE) TAB at 17:28

## 2018-06-09 RX ADMIN — CLONIDINE HYDROCHLORIDE 0.2 MG: 0.1 TABLET ORAL at 09:17

## 2018-06-09 RX ADMIN — PANTOPRAZOLE SODIUM 40 MG: 40 TABLET, DELAYED RELEASE ORAL at 09:17

## 2018-06-09 RX ADMIN — POTASSIUM CHLORIDE 40 MEQ: 750 TABLET, EXTENDED RELEASE ORAL at 12:41

## 2018-06-09 RX ADMIN — VERAPAMIL HYDROCHLORIDE 120 MG: 120 TABLET, FILM COATED, EXTENDED RELEASE ORAL at 09:17

## 2018-06-09 RX ADMIN — FLUTICASONE FUROATE AND VILANTEROL TRIFENATATE 1 PUFF: 200; 25 POWDER RESPIRATORY (INHALATION) at 09:18

## 2018-06-09 RX ADMIN — Medication 10 ML: at 20:50

## 2018-06-09 RX ADMIN — OLANZAPINE 10 MG: 10 TABLET, FILM COATED ORAL at 17:28

## 2018-06-09 RX ADMIN — Medication 10 ML: at 06:40

## 2018-06-09 NOTE — PROGRESS NOTES
SHIFT SUMMARY:    0700: Bedside report received from [facility], [address] [place] [address]. Assumed care of patient. 1900: Bedside report given to oncradha nurse. [address] NURSING NOTE   Admission Date 6/5/2018   Admission Diagnosis Alcohol withdrawal ([address]   Consults IP CONSULT TO HEMATOLOGY  IP CONSULT TO GASTROENTEROLOGY      Cardiac Monitoring [x] Yes [] No      Purposeful Hourly Rounding [x] Yes    Jeovany Score Total Score: 3   Jeovany score 3 or > [x] Bed Alarm [] Avasys [] 1:1 sitter [] Patient refused (Signed refusal form in chart)   Rosalio Score Rosalio Score: 18   Rosalio score 14 or < [] PMT consult [] Wound Care consult    []  Specialty bed  [] Nutrition consult      Influenza Vaccine Received Flu Vaccine for Current Season (usually Sept-March): Not Flu Season           Oxygen needs? [x] Room air Oxygen @  []1L    []2L    []3L   []4L    []5L   []6L via  NC   Chronic home O2 use? [] Yes [] No  Perform O2 challenge test and document in progress note using smartphrase (.Homeoxygen)      Last bowel movement Last Bowel Movement Date: 06/09/18      Urinary Catheter             LDAs               Peripheral IV 06/06/18 Left Forearm (Active)   Site Assessment Clean, dry, & intact 6/9/2018  8:00 AM   Phlebitis Assessment 0 6/9/2018  8:00 AM   Infiltration Assessment 0 6/9/2018  8:00 AM   Dressing Status Clean, dry, & intact 6/9/2018  8:00 AM   Dressing Type Tape;Transparent 6/9/2018  8:00 AM   Hub Color/Line Status Pink;Flushed;Patent 6/9/2018  8:00 AM   Action Taken Other (comment) 6/6/2018  8:00 PM   Alcohol Cap Used No 6/7/2018  7:30 PM                         Readmission Risk Assessment Tool Score Low Risk            11       Total Score        2 . Living with Significant Other. Assisted Living. LTAC. SNF. or   Rehab    4 IP Visits Last 12 Months (1-3=4, 4=9, >4=11)    4 Pt.  Coverage (Medicare=5 , Medicaid, or Self-Pay=4)    1 Charlson Comorbidity Score (Age + Comorbid Conditions)        Criteria that do not apply:    Has Seen PCP in Last 6 Months (Yes=3, No=0)    Patient Length of Stay (>5 days = 3)       Expected Length of Stay 3d 9h   Actual Length of Stay 3

## 2018-06-09 NOTE — PROGRESS NOTES
Problem: Falls - Risk of  Goal: *Absence of Falls  Document Jeovany Fall Risk and appropriate interventions in the flowsheet.    Outcome: Progressing Towards Goal  Fall Risk Interventions:  Mobility Interventions: Bed/chair exit alarm    Mentation Interventions: Adequate sleep, hydration, pain control    Medication Interventions: Assess postural VS orthostatic hypotension    Elimination Interventions: Bed/chair exit alarm

## 2018-06-09 NOTE — PROGRESS NOTES
Problem: Falls - Risk of  Goal: *Absence of Falls  Document Jeovany Fall Risk and appropriate interventions in the flowsheet.    Outcome: Progressing Towards Goal  Fall Risk Interventions:  Mobility Interventions: Assess mobility with egress test, Bed/chair exit alarm, Communicate number of staff needed for ambulation/transfer    Mentation Interventions: Adequate sleep, hydration, pain control, Bed/chair exit alarm    Medication Interventions: Assess postural VS orthostatic hypotension, Bed/chair exit alarm, Evaluate medications/consider consulting pharmacy    Elimination Interventions: Bed/chair exit alarm, Call light in reach, Elevated toilet seat    History of Falls Interventions: Bed/chair exit alarm, Consult care management for discharge planning, Door open when patient unattended

## 2018-06-09 NOTE — PROGRESS NOTES
Hospitalist Progress Note    NAME: Michael Armenta   :  1975   MRN:  [id number]       Assessment / Plan:  Alcohol withdrawal vs cocaine overdose  Poly substance drug abuse  Cont on tele  CIWa with prn aitvan, only 1 dose po overngiht  Banana bag, changed to po vit, taking pos  Counseled cessation   UDS positive for cocaine and Opiods- no bb  Resumed home antipsychotic, odd affect, avoid oversedation with ativan unless needed for DTs  CM has given substance abuse help resources    Unsteady gait  neg head CT to eval, consider MRI, may be alcohol related  Cont PT     Iron deficiency Anemia  Suspect due to Menorrhagia (reported by pt heavy menses)  Thrombocytopenia, improving   +fibroids on pelvic US, Iron Profile, Ferritin, B12, folate c/w Iron def anemia, ferritin of 10  Iron per heme  S/p transfusion, h/h better, follow  Hematology appreciated  1/3 heme+stool for occult blood  cont ppi  -appreciate GI eval, plans for op egd/colon  -us neg, borderline CBD size, no stones  -will need op gyn f/u as well    chronic diarrhea- stool studies, gi consult, cont probiotic     hypok- supp po and recheck in am, better    hypomag- cont po supp and follow     Cocaine induced chest pain night before admission, resolved  Tn negative     HTN, uncontrolled  PRN clonidine    resume PO meds, verapamil, follow  No bb with hx of cocaine  No ace/arb 2nd to allergy   cont scheduled clonidine, increase dose.     suspected UTI, POA   -urine cultures-p GNRs--> e coli  IV Rocephin s/p 3 days      Type II diabetes mellitus  SSI for now    Asthma   Continue Advair, PRN nebs    Morbid obesity, BMI 44, wt loss     Code Status: Full  Surrogate Decision Maker:      DVT Prophylaxis: SCDs, anemia   Baseline: multi drug abuse    Prophylaxis: SCD's  Recommended Disposition: Consult CM, needs home health, currently living in a hotel. .     Subjective:     Chief Complaint / Reason for Physician Visit  Shaky/sweaty     Patient still says she feels a little shaky but better than when she came in. Denies any shortness of breath or chest pain. Her  is at the bedside and says that she is a little sleepier and he thinks it is the medicine that she is getting. She is getting ready to eat lunch. Denies any nausea vomiting or abdominal pain. Has had frequent stools but no melena or blood in her stool and is not currently on her period. Patient denies ever having had an EGD or colonoscopy. 6/8 pt feels better. Denies sob/cp. Still loose stools. Says she does not want to drink anymore. Per  somewhat off balance when she walks, this is new for her. She normally drinks 4-5 24oz fortified beers/day. She deneis feeling shaky today. 6/9 pt w/o c/os. Says had trouble sleeping and that is why she took po ativan last night. She is afraid of being dced, bc she will drink again.  now tells me they have no home and are lining in a hotel. She is still unsteady to get up, ut a little better. Still diarrhea, no blood. Patient was evaluated at 10:15am      Discussed with RN events overnight. Review of Systems:  Symptom Y/N Comments  Symptom Y/N Comments   Fever/Chills    Chest Pain n    Poor Appetite    Edema     Cough    Abdominal Pain n    Sputum    Joint Pain     SOB/ZHANG n   Pruritis/Rash     Nausea/vomit n   Tolerating PT/OT     Diarrhea    Tolerating Diet     Constipation    Other       Could NOT obtain due to:      Objective:     VITALS:   Last 24hrs VS reviewed since prior progress note.  Most recent are:  Patient Vitals for the past 24 hrs:   Temp Pulse Resp BP SpO2   06/09/18 0811 98.1 °F (36.7 °C) 87 20 (!) 144/91 98 %   06/09/18 0231 97.7 °F (36.5 °C) 81 22 151/83 99 %   06/08/18 2254 97.7 °F (36.5 °C) 66 18 125/70 98 %   06/08/18 2057 98.5 °F (36.9 °C) 67 20 143/80 99 %   06/08/18 1857 98.3 °F (36.8 °C) 70 20 149/83 98 %   06/08/18 1442 98.6 °F (37 °C) 74 20 145/90 98 %   06/08/18 1103 98.9 °F (37.2 °C) 76 24 (!) 147/93 97 %       Intake/Output Summary (Last 24 hours) at 06/09/18 1057  Last data filed at 06/09/18 0904   Gross per 24 hour   Intake              240 ml   Output              500 ml   Net             -260 ml        PHYSICAL EXAM:  Patient is awake and alert, flat affect, oriented ×3 on room air no distress conjunctiva are pink oropharynx mucous membranes are moist cardiovascular regular rate no murmurs rubs or gallops lungs are clear without wheezes rhonchi or crackles somewhat decreased in the bases abdomen is obese bowel sounds present soft nontender extremities no clubbing cyanosis or edema, no tremor, no asterixis. Reviewed most current lab test results and cultures  YES  Reviewed most current radiology test results   YES  Review and summation of old records today    NO  Reviewed patient's current orders and MAR    YES  PMH/SH reviewed - no change compared to H&P  ________________________________________________________________________  Care Plan discussed with:    Comments   Patient y    Family  y    RN y    Care Manager     Consultant                        Multidiciplinary team rounds were held today with , nursing, pharmacist and clinical coordinator. Patient's plan of care was discussed; medications were reviewed and discharge planning was addressed. ________________________________________________________________________  Total NON critical care TIME:   Minutes    Total CRITICAL CARE TIME Spent:   Minutes non procedure based      Comments   >50% of visit spent in counseling and coordination of care     ________________________________________________________________________  Cher Piper MD     Procedures: see electronic medical records for all procedures/Xrays and details which were not copied into this note but were reviewed prior to creation of Plan. LABS:  I reviewed today's most current labs and imaging studies.   Pertinent labs include:  Recent Labs      06/09/18   0140 06/08/18 0317 06/07/18   0542   WBC  6.2  5.5  5.0   HGB  8.3*  8.6*  8.7*   HCT  30.2*  31.0*  30.6*   PLT  153  129*  95*     Recent Labs      06/09/18   0140  06/08/18 0317 06/07/18   0542   NA  140  142  139   K  3.5  3.4*  3.2*   CL  111*  111*  108   CO2  20*  21  23   GLU  109*  131*  102*   BUN  7  9  10   CREA  0.87  0.80  0.84   CA  8.4*  8.2*  8.4*   MG  1.9  1.5*  1.7   PHOS   --    --   3.8   ALB   --    --   3.1*   TBILI   --    --   1.3*   SGOT   --    --   98*   ALT   --    --   44       Signed: Cliff Kendall MD

## 2018-06-10 VITALS
HEART RATE: 70 BPM | OXYGEN SATURATION: 100 % | DIASTOLIC BLOOD PRESSURE: 96 MMHG | SYSTOLIC BLOOD PRESSURE: 152 MMHG | TEMPERATURE: 98.2 F | WEIGHT: 250 LBS | HEIGHT: 63 IN | BODY MASS INDEX: 44.3 KG/M2 | RESPIRATION RATE: 18 BRPM

## 2018-06-10 LAB
ABO + RH BLD: NORMAL
ANION GAP SERPL CALC-SCNC: 10 MMOL/L (ref 5–15)
BLD PROD TYP BPU: NORMAL
BLOOD GROUP ANTIBODIES SERPL: NORMAL
BPU ID: NORMAL
BUN SERPL-MCNC: 8 MG/DL (ref 6–20)
BUN/CREAT SERPL: 10 (ref 12–20)
CALCIUM SERPL-MCNC: 8.7 MG/DL (ref 8.5–10.1)
CHLORIDE SERPL-SCNC: 110 MMOL/L (ref 97–108)
CO2 SERPL-SCNC: 21 MMOL/L (ref 21–32)
CREAT SERPL-MCNC: 0.81 MG/DL (ref 0.55–1.02)
CROSSMATCH RESULT,%XM: NORMAL
GLUCOSE BLD STRIP.AUTO-MCNC: 157 MG/DL (ref 65–100)
GLUCOSE BLD STRIP.AUTO-MCNC: 163 MG/DL (ref 65–100)
GLUCOSE SERPL-MCNC: 124 MG/DL (ref 65–100)
HCT VFR BLD AUTO: 30.2 % (ref 35–47)
HGB BLD-MCNC: 8.2 G/DL (ref 11.5–16)
MAGNESIUM SERPL-MCNC: 1.8 MG/DL (ref 1.6–2.4)
PHOSPHATE SERPL-MCNC: 2.6 MG/DL (ref 2.6–4.7)
POTASSIUM SERPL-SCNC: 4.2 MMOL/L (ref 3.5–5.1)
SERVICE CMNT-IMP: ABNORMAL
SERVICE CMNT-IMP: ABNORMAL
SODIUM SERPL-SCNC: 141 MMOL/L (ref 136–145)
SPECIMEN EXP DATE BLD: NORMAL
STATUS OF UNIT,%ST: NORMAL
UNIT DIVISION, %UDIV: 0

## 2018-06-10 PROCEDURE — 85018 HEMOGLOBIN: CPT

## 2018-06-10 PROCEDURE — 74011250637 HC RX REV CODE- 250/637: Performed by: INTERNAL MEDICINE

## 2018-06-10 PROCEDURE — 84100 ASSAY OF PHOSPHORUS: CPT

## 2018-06-10 PROCEDURE — 36415 COLL VENOUS BLD VENIPUNCTURE: CPT

## 2018-06-10 PROCEDURE — 80048 BASIC METABOLIC PNL TOTAL CA: CPT

## 2018-06-10 PROCEDURE — 74011250637 HC RX REV CODE- 250/637: Performed by: EMERGENCY MEDICINE

## 2018-06-10 PROCEDURE — 74011636637 HC RX REV CODE- 636/637: Performed by: INTERNAL MEDICINE

## 2018-06-10 PROCEDURE — 82962 GLUCOSE BLOOD TEST: CPT

## 2018-06-10 PROCEDURE — 83735 ASSAY OF MAGNESIUM: CPT

## 2018-06-10 RX ORDER — ASPIRIN 325 MG/1
100 TABLET, FILM COATED ORAL DAILY
Qty: 30 TAB | Refills: 0 | Status: SHIPPED | OUTPATIENT
Start: 2018-06-11 | End: 2018-07-11

## 2018-06-10 RX ORDER — PANTOPRAZOLE SODIUM 40 MG/1
40 TABLET, DELAYED RELEASE ORAL
Qty: 30 TAB | Refills: 0 | Status: SHIPPED | OUTPATIENT
Start: 2018-06-11 | End: 2018-07-11

## 2018-06-10 RX ORDER — CLONIDINE HYDROCHLORIDE 0.3 MG/1
0.3 TABLET ORAL 2 TIMES DAILY
Qty: 60 TAB | Refills: 0 | Status: SHIPPED | OUTPATIENT
Start: 2018-06-10 | End: 2018-07-10

## 2018-06-10 RX ORDER — LANOLIN ALCOHOL/MO/W.PET/CERES
325 CREAM (GRAM) TOPICAL 2 TIMES DAILY WITH MEALS
Qty: 60 TAB | Refills: 0 | Status: SHIPPED | OUTPATIENT
Start: 2018-06-10 | End: 2018-07-10

## 2018-06-10 RX ADMIN — FERROUS SULFATE TAB 325 MG (65 MG ELEMENTAL FE) 325 MG: 325 (65 FE) TAB at 09:48

## 2018-06-10 RX ADMIN — Medication 100 MG: at 09:48

## 2018-06-10 RX ADMIN — CLONIDINE HYDROCHLORIDE 0.3 MG: 0.1 TABLET ORAL at 09:48

## 2018-06-10 RX ADMIN — Medication 1 CAPSULE: at 09:48

## 2018-06-10 RX ADMIN — PANTOPRAZOLE SODIUM 40 MG: 40 TABLET, DELAYED RELEASE ORAL at 09:48

## 2018-06-10 RX ADMIN — FLUTICASONE FUROATE AND VILANTEROL TRIFENATATE 1 PUFF: 200; 25 POWDER RESPIRATORY (INHALATION) at 09:49

## 2018-06-10 RX ADMIN — INSULIN LISPRO 2 UNITS: 100 INJECTION, SOLUTION INTRAVENOUS; SUBCUTANEOUS at 09:49

## 2018-06-10 RX ADMIN — MULTIPLE VITAMINS W/ MINERALS TAB 1 TABLET: TAB at 09:48

## 2018-06-10 RX ADMIN — VERAPAMIL HYDROCHLORIDE 120 MG: 120 TABLET, FILM COATED, EXTENDED RELEASE ORAL at 09:47

## 2018-06-10 RX ADMIN — INSULIN LISPRO 2 UNITS: 100 INJECTION, SOLUTION INTRAVENOUS; SUBCUTANEOUS at 12:48

## 2018-06-10 RX ADMIN — Medication 400 MG: at 09:48

## 2018-06-10 RX ADMIN — FOLIC ACID 1 MG: 1 TABLET ORAL at 09:47

## 2018-06-10 RX ADMIN — ACETAMINOPHEN 650 MG: 325 TABLET ORAL at 11:16

## 2018-06-10 NOTE — DISCHARGE INSTRUCTIONS
HOSPITALIST DISCHARGE INSTRUCTIONS    NAME: Moira Davis   :  1975   MRN:  [id number]     Date/Time:  6/10/2018 10:55 AM    ADMIT DATE: 2018   DISCHARGE DATE: 6/10/2018         · It is important that you take the medication exactly as they are prescribed. · Keep your medication in the bottles provided by the pharmacist and keep a list of the medication names, dosages, and times to be taken in your wallet. · Do not take other medications without consulting your doctor. What to do at Home    Recommended diet:  Diabetic Diet    Recommended activity: Activity as tolerated and PT/OT per Home Health      If you have questions regarding the hospital related prescriptions or hospital related issues please call [facility] at [phone]. You can always direct your questions to your primary care doctor if you are unable to reach your hospital physician; your PCP works as an extension of your hospital doctor just like your hospital doctor is an extension of your PCP for your time at the hospital [facility], [facility])    If you experience any of the following symptoms then please call your primary care physician or return to the emergency room if you cannot get hold of your doctor:    Fever, chills, nausea, vomiting, or persistent diarrhea  Worsening weakness or new problems with your speech or balance  Dark stools or visible blood in your stools  New Leg swelling or shortness of breath as these could be signs of a clot    Additional Instructions:    F/u with pcp next week and get referral to ob/gyn  F/u with gi in 2 weeks  No Asprin/NSAIDS  No alcohol, no cocaine  Call md or go to the ER if blood in stool, shortness of breath, chest pain    Bring these papers with you to your follow up appointments.  The papers will help your doctors be sure to continue the care plan from the hospital.              Information obtained by :  I understand that if any problems occur once I am at home I am to contact my physician. I understand and acknowledge receipt of the instructions indicated above.                                                                                                                                            Physician's or R.N.'s Signature                                                                  Date/Time                                                                                                                                              Patient or Representative Signature

## 2018-06-10 NOTE — PROGRESS NOTES
Problem: Falls - Risk of  Goal: *Absence of Falls  Document Jeovany Fall Risk and appropriate interventions in the flowsheet.    Outcome: Progressing Towards Goal  Fall Risk Interventions:  Mobility Interventions: Bed/chair exit alarm, Patient to call before getting OOB    Mentation Interventions: Adequate sleep, hydration, pain control    Medication Interventions: Bed/chair exit alarm, Patient to call before getting OOB, Teach patient to arise slowly    Elimination Interventions: Bed/chair exit alarm    History of Falls Interventions: Consult care management for discharge planning

## 2018-06-10 NOTE — PROGRESS NOTES
Care Management:    Patient is discharging home today. Her  is at her bedside. Patient states she no longer has her motel room and that her mental health counselor is coming to pick her up and will take her to a new motel. I have given the nurse and the patient my number to call me with an address and I will set up a [facility] visit. I explained at length I needed an address in order to set up the [facility]. I have also explained this to Dr Matilda Baird. Patient in agreement with [facility]. Patient has her Medicaid and uses [employer]. Patient to call and  schedule follow up with her PCP. Care Management Interventions  PCP Verified by CM: Yes  Palliative Care Criteria Met (RRAT>21 & CHF Dx)?: No  Mode of Transport at Discharge:  Other (see comment)  Transition of Care Consult (CM Consult): Discharge Planning  MyChart Signup: No  Discharge Durable Medical Equipment: No  Physical Therapy Consult: Yes  Occupational Therapy Consult: Yes  Speech Therapy Consult: No  Current Support Network: Lives with Spouse, Own Home  Confirm Follow Up Transport: Family  Plan discussed with Pt/Family/Caregiver: Yes  Freedom of Choice Offered: Yes  Discharge Location  Discharge Placement: Home with family assistance     Missy Dominique crm ac [phone]

## 2018-06-10 NOTE — DISCHARGE SUMMARY
Hospitalist Discharge Summary     Patient ID:  Aisha Perez  [id number]  58 y.o.  1975    PCP on record: Demetra Garcia MD    Admit date: 6/5/2018  Discharge date and time: 6/10/2018      DISCHARGE DIAGNOSIS:    Alcohol withdrawal  Cocaine use  Urinary tract infection with E. coli  Ataxia, negative head CT  Severe iron deficiency anemia status post transfusion  Anemia suspected secondary to menorrhagia  Transient thrombocytopenia  Uterine fibroids  HEENT positive stools  Chronic diarrhea  Hypokalemia  Hypomagnesemia  Cocaine induced chest pain  Uncontrolled hypertension type 2 diabetes  History of asthma  Morbid obesity, BMI 44      CONSULTATIONS:  IP CONSULT TO HEMATOLOGY  IP CONSULT TO GASTROENTEROLOGY    Excerpted HPI from H&P of Michael Lugo MD:  CHIEF COMPLAINT: shaking, sweating     HISTORY OF PRESENT ILLNESS:     Aisha Perez is a 38 y.o.  female who presents with complaining of shaking and swelling. Pt did report having shortness of breath and chest pain when used cocaine night before. Pt reported alcohol and cocaine abuse. Pt denies any fever, chills, nausea, vomiting, diarrhea, problems urination, shortness of breath. Pt did mention she wants to quit alcohol in ED. In ED pt noted to have tachycardia and high blood pressure.      We were asked to admit for work up and evaluation of the above problems. ______________________________________________________________________  DISCHARGE SUMMARY/HOSPITAL COURSE:  for full details see H&P, daily progress notes, labs, consult notes.      Alcohol withdrawal vs cocaine overdose  Poly substance drug abuse  Cont on tele  CIWa with prn aitvan, no ativan overngiht  Banana bag, changed to po vit, taking pos  Counseled cessation   UDS positive for cocaine and Opiods- no bb  Resumed home antipsychotic, odd affect  CM has given substance abuse help resources     Unsteady gait  neg head CT to eval, consider MRI if persists as an outpt, may be alcohol related  Cont PT      Iron deficiency Anemia  Suspect due to Menorrhagia (reported by pt heavy menses)  Thrombocytopenia, improving   +fibroids on pelvic US, Iron Profile, Ferritin, B12, folate c/w Iron def anemia, ferritin of 10  Iron per heme  S/p transfusion, h/h better, follow  Hematology appreciated  1/3 heme+stool for occult blood  cont ppi  -appreciate GI eval, plans for op egd/colon  -us neg, borderline CBD size, no stones  -will need op gyn f/u as well     chronic diarrhea- stool studies,f/u with gi, cont probiotic      hypok- better     hypomag- cont po supp at dc      Cocaine induced chest pain night before admission, resolved  Tn negative      HTN, uncontrolled  PRN clonidine    resume PO meds, verapamil, follow  No bb with hx of cocaine  No ace/arb 2nd to allergy   cont scheduled clonidine  bp better today      suspected UTI, POA   -urine cultures- e coli  IV Rocephin s/p 3 days       Type II diabetes mellitus  SSI for now     Asthma   Continue Advair, PRN nebs     Morbid obesity, BMI 44, wt loss    Recommended Disposition:     Patient be discharged home today. she is currently residing with her  and hotel. Because of her disequilibrium, I requested home health for PT as well as monitoring of her blood pressure. Care management reports that this will be able to be done at the hotel and they will provide her with information regarding community support and assistance for housing and substance abuse. Patient has completed her antibiotics for urinary tract infection. Her anemia has been stable. She will need to follow-up with GYN for her fibroids and menorrhagia. She will need to follow-up with GI for outpatient EGD and colon. She has been advised against further drinking as well as cocaine use.      _______________________________________________________________________  Patient seen and examined by me on discharge day.   Pertinent Findings:  Patient feels ready for discharge. Denies any shakiness. Has no other complaints. She is interested in not going back to drinking in the future. She is awake and alert oriented ×3 no distress on room air cardiovascular regular rate lungs are clear abdomen is obese but benign extremities no clubbing cyanosis edema  _______________________________________________________________________  DISCHARGE MEDICATIONS:   Discharge Medication List as of 6/10/2018  2:18 PM      START taking these medications    Details   cloNIDine HCl (CATAPRES) 0.3 mg tablet Take 1 Tab by mouth two (2) times a day for 30 days. , Print, Disp-60 Tab, R-0      ferrous sulfate 325 mg (65 mg iron) tablet Take 1 Tab by mouth two (2) times daily (with meals) for 30 days. , Print, Disp-60 Tab, R-0      Thiamine Mononitrate (B-1) 100 mg tablet Take 1 Tab by mouth daily for 30 days. , Normal, Disp-30 Tab, R-0      pantoprazole (PROTONIX) 40 mg tablet Take 1 Tab by mouth Daily (before breakfast) for 30 days. , Print, Disp-30 Tab, R-0         CONTINUE these medications which have NOT CHANGED    Details   ketotifen (ZADITOR) 0.025 % (0.035 %) ophthalmic solution Administer 1 Drop to both eyes two (2) times daily as needed (ALLERGIES). , Historical Med      cetirizine (ZYRTEC) 10 mg tablet Take 10 mg by mouth daily as needed for Allergies. , Historical Med      OLANZapine (ZYPREXA) 10 mg tablet Take 10 mg by mouth nightly., Historical Med      verapamil ER (VERELAN) 120 mg ER capsule Take 120 mg by mouth daily. , Historical Med      hydrOXYzine pamoate (VISTARIL) 25 mg capsule Take 25 mg by mouth three (3) times daily as needed for Itching., Historical Med      albuterol (PROVENTIL, VENTOLIN) 90 mcg/actuation inhaler Take 1-2 Puffs by inhalation every four (4) hours as needed for Wheezing or Shortness of Breath., Normal, Disp-17 g, R-0      fluticasone-salmeterol (ADVAIR DISKUS) 250-50 mcg/dose diskus inhaler Take 1 Puff by inhalation two (2) times a day., Historical Med STOP taking these medications       verapamil (CALAN) 120 mg tablet Comments:   Reason for Stopping:         albuterol (PROAIR HFA) 90 mcg/actuation inhaler Comments:   Reason for Stopping:               My Recommended Diet, Activity, Wound Care, and follow-up labs are listed in the patient's Discharge Insturctions which I have personally completed and reviewed. ______________________________________________________________________    Risk of deterioration: Moderate    Condition at Discharge:  Stable  ______________________________________________________________________    Disposition  Home with family and home health services  ______________________________________________________________________    Care Plan discussed with:   Patient, Family, RN, Care Manager    ______________________________________________________________________    Code Status: Full Code  ______________________________________________________________________      Follow up with:   PCP : Anila Muro MD  Follow-up Information     Follow up With Details Comments Contact Kwame Perdomo MD Schedule an appointment as soon as possible for a visit in 3 days hospital follow-up [address] [facility] [address]      Jef Eng MD In 2 weeks  [address] [facility]  [address]  Please call if you need assistance with somewhere to stay.   [address] [facility], [address] [place] [address]  [phone]              Total time in minutes spent coordinating this discharge (includes going over instructions, follow-up, prescriptions, and preparing report for sign off to her PCP) :  40 minutes    Signed:  Sera Flores MD

## 2018-06-10 NOTE — PROGRESS NOTES
DISCHARGE SUMMARY from Nurse  Patient stable for discharge. I have reviewed discharge instructions with the patient and spouse. The patient and spouse verbalized understanding. All questions fully answered. Prescriptions and medication handouts given to patient and spouse. Telemonitor and PIV removed. No personal belongings, home medications and valuables left at patients bedside//safe. Patient and spouse verbalized no complaints. Nurse transported patient via wheel chair to private vehicle operated by [employer]. Dawit stated doesn't know where patient will be going yet, however, her and the patient will call care management with address once patient found a place. Patient has being provided with [employer] care management information by Dionte Villegas.  MD aware

## 2018-06-21 LAB
ALBUMIN SERPL-MCNC: 3.6 G/DL (ref 3.5–5)
ALBUMIN/GLOB SERPL: 0.7 {RATIO} (ref 1.1–2.2)
ALP SERPL-CCNC: 82 U/L (ref 45–117)
ALT SERPL-CCNC: 58 U/L (ref 12–78)
ANION GAP SERPL CALC-SCNC: 12 MMOL/L (ref 5–15)
APPEARANCE UR: ABNORMAL
AST SERPL-CCNC: 141 U/L (ref 15–37)
BACTERIA URNS QL MICRO: NEGATIVE /HPF
BASOPHILS # BLD: 0.1 K/UL (ref 0–0.1)
BASOPHILS NFR BLD: 1 % (ref 0–1)
BILIRUB SERPL-MCNC: 0.8 MG/DL (ref 0.2–1)
BILIRUB UR QL: NEGATIVE
BNP SERPL-MCNC: 16 PG/ML (ref 0–125)
BUN SERPL-MCNC: 6 MG/DL (ref 6–20)
BUN/CREAT SERPL: 6 (ref 12–20)
CALCIUM SERPL-MCNC: 8.8 MG/DL (ref 8.5–10.1)
CHLORIDE SERPL-SCNC: 102 MMOL/L (ref 97–108)
CK MB CFR SERPL CALC: 0.9 % (ref 0–2.5)
CK MB SERPL-MCNC: 1.8 NG/ML (ref 5–25)
CK SERPL-CCNC: 192 U/L (ref 26–192)
CO2 SERPL-SCNC: 27 MMOL/L (ref 21–32)
COLOR UR: ABNORMAL
CREAT SERPL-MCNC: 0.98 MG/DL (ref 0.55–1.02)
DIFFERENTIAL METHOD BLD: ABNORMAL
EOSINOPHIL # BLD: 0.2 K/UL (ref 0–0.4)
EOSINOPHIL NFR BLD: 4 % (ref 0–7)
EPITH CASTS URNS QL MICRO: ABNORMAL /LPF
ERYTHROCYTE [DISTWIDTH] IN BLOOD BY AUTOMATED COUNT: 24.4 % (ref 11.5–14.5)
GLOBULIN SER CALC-MCNC: 5.3 G/DL (ref 2–4)
GLUCOSE SERPL-MCNC: 137 MG/DL (ref 65–100)
GLUCOSE UR STRIP.AUTO-MCNC: NEGATIVE MG/DL
HCT VFR BLD AUTO: 35.1 % (ref 35–47)
HGB BLD-MCNC: 10.1 G/DL (ref 11.5–16)
HGB UR QL STRIP: ABNORMAL
HYALINE CASTS URNS QL MICRO: ABNORMAL /LPF (ref 0–5)
IMM GRANULOCYTES # BLD: 0.1 K/UL (ref 0–0.04)
IMM GRANULOCYTES NFR BLD AUTO: 1 % (ref 0–0.5)
KETONES UR QL STRIP.AUTO: NEGATIVE MG/DL
LEUKOCYTE ESTERASE UR QL STRIP.AUTO: ABNORMAL
LYMPHOCYTES # BLD: 2.5 K/UL (ref 0.8–3.5)
LYMPHOCYTES NFR BLD: 42 % (ref 12–49)
MCH RBC QN AUTO: 20 PG (ref 26–34)
MCHC RBC AUTO-ENTMCNC: 28.8 G/DL (ref 30–36.5)
MCV RBC AUTO: 69.5 FL (ref 80–99)
MONOCYTES # BLD: 0.5 K/UL (ref 0–1)
MONOCYTES NFR BLD: 8 % (ref 5–13)
NEUTS SEG # BLD: 2.5 K/UL (ref 1.8–8)
NEUTS SEG NFR BLD: 44 % (ref 32–75)
NITRITE UR QL STRIP.AUTO: NEGATIVE
NRBC # BLD: 0 K/UL (ref 0–0.01)
NRBC BLD-RTO: 0 PER 100 WBC
PH UR STRIP: 5.5 [PH] (ref 5–8)
PLATELET # BLD AUTO: 351 K/UL (ref 150–400)
PMV BLD AUTO: 9.4 FL (ref 8.9–12.9)
POTASSIUM SERPL-SCNC: 3.5 MMOL/L (ref 3.5–5.1)
PROT SERPL-MCNC: 8.9 G/DL (ref 6.4–8.2)
PROT UR STRIP-MCNC: 30 MG/DL
RBC # BLD AUTO: 5.05 M/UL (ref 3.8–5.2)
RBC #/AREA URNS HPF: >100 /HPF (ref 0–5)
RBC MORPH BLD: ABNORMAL
SODIUM SERPL-SCNC: 141 MMOL/L (ref 136–145)
SP GR UR REFRACTOMETRY: 1.01 (ref 1–1.03)
TROPONIN I SERPL-MCNC: <0.05 NG/ML
UA: UC IF INDICATED,UAUC: ABNORMAL
UROBILINOGEN UR QL STRIP.AUTO: 0.2 EU/DL (ref 0.2–1)
WBC # BLD AUTO: 5.9 K/UL (ref 3.6–11)
WBC URNS QL MICRO: ABNORMAL /HPF (ref 0–4)

## 2018-06-21 PROCEDURE — 80307 DRUG TEST PRSMV CHEM ANLYZR: CPT | Performed by: EMERGENCY MEDICINE

## 2018-06-21 PROCEDURE — 87147 CULTURE TYPE IMMUNOLOGIC: CPT | Performed by: EMERGENCY MEDICINE

## 2018-06-21 PROCEDURE — 85025 COMPLETE CBC W/AUTO DIFF WBC: CPT | Performed by: EMERGENCY MEDICINE

## 2018-06-21 PROCEDURE — 81001 URINALYSIS AUTO W/SCOPE: CPT | Performed by: EMERGENCY MEDICINE

## 2018-06-21 PROCEDURE — 87086 URINE CULTURE/COLONY COUNT: CPT | Performed by: EMERGENCY MEDICINE

## 2018-06-21 PROCEDURE — 93005 ELECTROCARDIOGRAM TRACING: CPT

## 2018-06-21 PROCEDURE — 82550 ASSAY OF CK (CPK): CPT | Performed by: EMERGENCY MEDICINE

## 2018-06-21 PROCEDURE — 36415 COLL VENOUS BLD VENIPUNCTURE: CPT | Performed by: EMERGENCY MEDICINE

## 2018-06-21 PROCEDURE — 83880 ASSAY OF NATRIURETIC PEPTIDE: CPT | Performed by: EMERGENCY MEDICINE

## 2018-06-21 PROCEDURE — 99284 EMERGENCY DEPT VISIT MOD MDM: CPT

## 2018-06-21 PROCEDURE — 80053 COMPREHEN METABOLIC PANEL: CPT | Performed by: EMERGENCY MEDICINE

## 2018-06-21 PROCEDURE — 84484 ASSAY OF TROPONIN QUANT: CPT | Performed by: EMERGENCY MEDICINE

## 2018-06-22 ENCOUNTER — HOSPITAL ENCOUNTER (EMERGENCY)
Age: 43
Discharge: HOME | End: 2018-06-22
Attending: EMERGENCY MEDICINE
Payer: MEDICAID

## 2018-06-22 ENCOUNTER — APPOINTMENT (OUTPATIENT)
Dept: ULTRASOUND IMAGING | Age: 43
End: 2018-06-22
Attending: EMERGENCY MEDICINE
Payer: MEDICAID

## 2018-06-22 VITALS
WEIGHT: 257.5 LBS | SYSTOLIC BLOOD PRESSURE: 159 MMHG | RESPIRATION RATE: 20 BRPM | DIASTOLIC BLOOD PRESSURE: 85 MMHG | HEIGHT: 63 IN | TEMPERATURE: 98.4 F | BODY MASS INDEX: 45.62 KG/M2 | HEART RATE: 95 BPM | OXYGEN SATURATION: 82 %

## 2018-06-22 DIAGNOSIS — M79.89 LEG SWELLING: Primary | ICD-10-CM

## 2018-06-22 DIAGNOSIS — M71.21 POPLITEAL CYST, RIGHT: ICD-10-CM

## 2018-06-22 DIAGNOSIS — I10 HYPERTENSION, UNSPECIFIED TYPE: ICD-10-CM

## 2018-06-22 DIAGNOSIS — Z91.14 NONCOMPLIANCE WITH MEDICATION REGIMEN: ICD-10-CM

## 2018-06-22 LAB
ATRIAL RATE: 89 BPM
CALCULATED P AXIS, ECG09: 49 DEGREES
CALCULATED R AXIS, ECG10: -15 DEGREES
CALCULATED T AXIS, ECG11: 45 DEGREES
DIAGNOSIS, 93000: NORMAL
ETHANOL SERPL-MCNC: 352 MG/DL
P-R INTERVAL, ECG05: 152 MS
Q-T INTERVAL, ECG07: 390 MS
QRS DURATION, ECG06: 90 MS
QTC CALCULATION (BEZET), ECG08: 474 MS
VENTRICULAR RATE, ECG03: 89 BPM

## 2018-06-22 PROCEDURE — 74011250637 HC RX REV CODE- 250/637: Performed by: EMERGENCY MEDICINE

## 2018-06-22 PROCEDURE — 93970 EXTREMITY STUDY: CPT

## 2018-06-22 RX ORDER — ONDANSETRON 4 MG/1
4 TABLET, ORALLY DISINTEGRATING ORAL
Status: COMPLETED | OUTPATIENT
Start: 2018-06-22 | End: 2018-06-22

## 2018-06-22 RX ADMIN — ONDANSETRON 4 MG: 4 TABLET, ORALLY DISINTEGRATING ORAL at 06:11

## 2018-06-22 NOTE — DISCHARGE INSTRUCTIONS
Baker's Cyst: Care Instructions  Your Care Instructions    A Baker's cyst is a swelling behind the knee. It may cause pain or stiffness when you bend your knee or straighten it all the way. Baker's cysts are also called popliteal cysts. If you have arthritis or another condition that is the cause of the Baker's cyst, your doctor may treat that condition. A Baker's cyst may go away on its own. If not, or if it is causing a lot of discomfort, your doctor may drain the fluid that has built up behind the knee. In some cases, a Baker's cyst is removed in surgery. There are things you can do at home, such as staying off your leg, to reduce the swelling and pain. Follow-up care is a key part of your treatment and safety. Be sure to make and go to all appointments, and call your doctor if you are having problems. It's also a good idea to know your test results and keep a list of the medicines you take. How can you care for yourself at home? · Rest your knee as much as possible. · Ask your doctor if you can take an over-the-counter pain medicine, such as acetaminophen (Tylenol), ibuprofen (Advil, Motrin), or naproxen (Aleve). Be safe with medicines. Read and follow all instructions on the label. · Use a cane, a crutch, a walker, or another device if you need help to get around. These can help rest your knees. · If you have an elastic bandage, make sure it is snug but not so tight that your leg is numb, tingles, or swells below the bandage. Ask your doctor if you can loosen the bandage if it is too tight. · Follow your doctor's instructions about how much weight you can put on your knee. · Stay at a healthy weight. Being overweight puts extra strain on your knee. When should you call for help? Call 911 anytime you think you may need emergency care. For example, call if:  ? · You have chest pain, are short of breath, or you cough up blood.    ?Call your doctor now or seek immediate medical care if:  ? · You have new or worse pain. ? · Your foot is cool or pale or changes color. ? · You have tingling, weakness, or numbness in your foot or toes. ? · You have signs of a blood clot in your leg (called a deep vein thrombosis), such as:  ¨ Pain in your calf, back of the knee, thigh, or groin. ¨ Redness or swelling in your leg. ? Watch closely for changes in your health, and be sure to contact your doctor if:  ? · You do not get better as expected. Where can you learn more? Go to http://estefania-gian.info/. Enter M361 in the search box to learn more about \"Baker's Cyst: Care Instructions. \"  Current as of: March 21, 2017  Content Version: 11.4  © 4159-8801 [employer]. Care instructions adapted under license by [employer] (which disclaims liability or warranty for this information). If you have questions about a medical condition or this instruction, always ask your healthcare professional. [place] [address] any warranty or liability for your use of this information. High Blood Pressure: Care Instructions  Your Care Instructions    If your blood pressure is usually above 140/90, you have high blood pressure, or hypertension. That means the top number is 140 or higher or the bottom number is 90 or higher, or both. Despite what a lot of people think, high blood pressure usually doesn't cause headaches or make you feel dizzy or lightheaded. It usually has no symptoms. But it does increase your risk for heart attack, stroke, and kidney or eye damage. The higher your blood pressure, the more your risk increases. Your doctor will give you a goal for your blood pressure. Your goal will be based on your health and your age. An example of a goal is to keep your blood pressure below 140/90. Lifestyle changes, such as eating healthy and being active, are always important to help lower blood pressure.  You might also take medicine to reach your blood pressure goal.  Follow-up care is a key part of your treatment and safety. Be sure to make and go to all appointments, and call your doctor if you are having problems. It's also a good idea to know your test results and keep a list of the medicines you take. How can you care for yourself at home? Medical treatment  · If you stop taking your medicine, your blood pressure will go back up. You may take one or more types of medicine to lower your blood pressure. Be safe with medicines. Take your medicine exactly as prescribed. Call your doctor if you think you are having a problem with your medicine. · Talk to your doctor before you start taking aspirin every day. Aspirin can help certain people lower their risk of a heart attack or stroke. But taking aspirin isn't right for everyone, because it can cause serious bleeding. · See your doctor regularly. You may need to see the doctor more often at first or until your blood pressure comes down. · If you are taking blood pressure medicine, talk to your doctor before you take decongestants or anti-inflammatory medicine, such as ibuprofen. Some of these medicines can raise blood pressure. · Learn how to check your blood pressure at home. Lifestyle changes  · Stay at a healthy weight. This is especially important if you put on weight around the waist. Losing even 10 pounds can help you lower your blood pressure. · If your doctor recommends it, get more exercise. Walking is a good choice. Bit by bit, increase the amount you walk every day. Try for at least 30 minutes on most days of the week. You also may want to swim, bike, or do other activities. · Avoid or limit alcohol. Talk to your doctor about whether you can drink any alcohol. · Try to limit how much sodium you eat to less than 2,300 milligrams (mg) a day. Your doctor may ask you to try to eat less than 1,500 mg a day.   · Eat plenty of fruits (such as bananas and oranges), vegetables, legumes, whole grains, and low-fat dairy products. · Lower the amount of saturated fat in your diet. Saturated fat is found in animal products such as milk, cheese, and meat. Limiting these foods may help you lose weight and also lower your risk for heart disease. · Do not smoke. Smoking increases your risk for heart attack and stroke. If you need help quitting, talk to your doctor about stop-smoking programs and medicines. These can increase your chances of quitting for good. When should you call for help? Call 911 anytime you think you may need emergency care. This may mean having symptoms that suggest that your blood pressure is causing a serious heart or blood vessel problem. Your blood pressure may be over 180/110. ? For example, call 911 if:  ? · You have symptoms of a heart attack. These may include:  ¨ Chest pain or pressure, or a strange feeling in the chest.  ¨ Sweating. ¨ Shortness of breath. ¨ Nausea or vomiting. ¨ Pain, pressure, or a strange feeling in the back, neck, jaw, or upper belly or in one or both shoulders or arms. ¨ Lightheadedness or sudden weakness. ¨ A fast or irregular heartbeat. ? · You have symptoms of a stroke. These may include:  ¨ Sudden numbness, tingling, weakness, or loss of movement in your face, arm, or leg, especially on only one side of your body. ¨ Sudden vision changes. ¨ Sudden trouble speaking. ¨ Sudden confusion or trouble understanding simple statements. ¨ Sudden problems with walking or balance. ¨ A sudden, severe headache that is different from past headaches. ? · You have severe back or belly pain. ?Do not wait until your blood pressure comes down on its own. Get help right away. ?Call your doctor now or seek immediate care if:  ? · Your blood pressure is much higher than normal (such as 180/110 or higher), but you don't have symptoms. ? · You think high blood pressure is causing symptoms, such as:  ¨ Severe headache. ¨ Blurry vision. ? Watch closely for changes in your health, and be sure to contact your doctor if:  ? · Your blood pressure measures 140/90 or higher at least 2 times. That means the top number is 140 or higher or the bottom number is 90 or higher, or both. ? · You think you may be having side effects from your blood pressure medicine. ? · Your blood pressure is usually normal, but it goes above normal at least 2 times. Where can you learn more? Go to http://estefania-gian.info/. Enter M579 in the search box to learn more about \"High Blood Pressure: Care Instructions. \"  Current as of: September 21, 2016  Content Version: 11.4  © 3451-1052 [employer]. Care instructions adapted under license by [employer] (which disclaims liability or warranty for this information). If you have questions about a medical condition or this instruction, always ask your healthcare professional. [address] any warranty or liability for your use of this information.

## 2018-06-22 NOTE — ED NOTES
Pt arrives via wheelchair to room c/o bilateral leg swelling x 2 days. Monitor x 2. Call bell in reach and plan of acre discussed.

## 2018-06-22 NOTE — ED NOTES
[facility] called for transport. ETA unknown at this time per [employer] rep. Per [facility] eligibility has  and will an complete and new eligibility form for patient and callback with confirmation and eta if patient is eligible for transport. 1131: MD requested updated set of vital. Pt refused repeat vital, grabbed belongings and is currently waiting in [facility] w/ family. Pt wheeled out of ED via w/c by family. Belongings & discharge paperwork out of ED with patient & family. Patient out of ED prior to obtaining discharge vitals. 9232: Patient has [employer] per [employer] rep and is not eligible. Must contact [address] [place] [address] for transport. 4047Kathleen Ruth Aranda to setup transport, s/w rep who stated, \"the system is being really, really slow. Can you call me back in 20 minutes or so? Or I can transfer you to member services. \" Phone hung up.     4235: Yellow cab given to patient per charge nurse.

## 2018-06-22 NOTE — ED PROVIDER NOTES
EMERGENCY DEPARTMENT HISTORY AND PHYSICAL EXAM      Date: 6/22/2018  Patient Name: Serenity Hardin    History of Presenting Illness     Chief Complaint   Patient presents with    Leg Swelling      x 2 days not taking clonidine thought this med was causing swelling       History Provided By: Patient and Family    HPI: Serenity Hardin, 38 y.o. female with PMHx significant for HTN, DM, asthma, presents via EMS to the ED with cc of constant BLLE swelling with associated mild aching pain x 2 days. Pt notes that she went to [facility] last night for her sxs but denies that they did any labs, imaging, or other workup. Per chart review, pt was seen here in the ED on the 5th of June and admitted for alcohol withdrawal, and she reports that since admission she is down to \"1-2 beers\" a day. Pt reports that she was prescribed Clonidine during that time, but notes that she stopped taking it 3 days ago. She notes that she is unsure why she stopped taking it but reports \"I think it caused my swelling. \" Pt denies any OTC medications or any other modifying factors for her sxs. She specifically denies any fevers, chills, N/V/D, constipation, CP, HA, SOB, dysuria or hematuria. There are no other complaints, changes, or physical findings at this time. PCP: María Plata MD    Current Outpatient Prescriptions   Medication Sig Dispense Refill    cloNIDine HCl (CATAPRES) 0.3 mg tablet Take 1 Tab by mouth two (2) times a day for 30 days. 60 Tab 0    ferrous sulfate 325 mg (65 mg iron) tablet Take 1 Tab by mouth two (2) times daily (with meals) for 30 days. 60 Tab 0    Thiamine Mononitrate (B-1) 100 mg tablet Take 1 Tab by mouth daily for 30 days. 30 Tab 0    pantoprazole (PROTONIX) 40 mg tablet Take 1 Tab by mouth Daily (before breakfast) for 30 days. 30 Tab 0    ketotifen (ZADITOR) 0.025 % (0.035 %) ophthalmic solution Administer 1 Drop to both eyes two (2) times daily as needed (ALLERGIES).       cetirizine (ZYRTEC) 10 mg tablet Take 10 mg by mouth daily as needed for Allergies.  OLANZapine (ZYPREXA) 10 mg tablet Take 10 mg by mouth nightly.  verapamil ER (VERELAN) 120 mg ER capsule Take 120 mg by mouth daily.  hydrOXYzine pamoate (VISTARIL) 25 mg capsule Take 25 mg by mouth three (3) times daily as needed for Itching.  albuterol (PROVENTIL, VENTOLIN) 90 mcg/actuation inhaler Take 1-2 Puffs by inhalation every four (4) hours as needed for Wheezing or Shortness of Breath. 17 g 0    fluticasone-salmeterol (ADVAIR DISKUS) 250-50 mcg/dose diskus inhaler Take 1 Puff by inhalation two (2) times a day. Past History     Past Medical History:  Past Medical History:   Diagnosis Date    Asthma     Diabetes ([address]     Hypertension     Psychiatric disorder     depression    Sleep apnea        Past Surgical History:  Past Surgical History:   Procedure Laterality Date    HX TUBAL LIGATION         Family History:  Family History   Problem Relation Age of Onset    Alcohol abuse Mother     Alcohol abuse Father        Social History:  Social History   Substance Use Topics    Smoking status: Never Smoker    Smokeless tobacco: Never Used    Alcohol use Yes      Comment: 2 or 3 40's a day 7/18/17       Allergies: Allergies   Allergen Reactions    Aspirin Rash     Patient denies allergy, sometimes itches    Lisinopril Swelling         Review of Systems   Review of Systems   Constitutional: Negative for activity change, appetite change, fatigue and fever. HENT: Negative. Negative for congestion, rhinorrhea and sore throat. Respiratory: Negative. Negative for cough, shortness of breath and wheezing. Cardiovascular: Positive for leg swelling. Negative for chest pain. Gastrointestinal: Negative. Negative for abdominal distention, abdominal pain, constipation, diarrhea, nausea and vomiting. Endocrine: Negative.     Genitourinary: Negative for difficulty urinating, dysuria, hematuria, menstrual problem, vaginal bleeding and vaginal discharge. Musculoskeletal: Negative. Negative for arthralgias, joint swelling and myalgias. Skin: Negative. Negative for rash. Neurological: Negative. Negative for dizziness, weakness, light-headedness and headaches. Psychiatric/Behavioral: Negative. Physical Exam   Physical Exam   Constitutional: She is oriented to person, place, and time. She appears well-developed and well-nourished. HENT:   Head: Atraumatic. Eyes: EOM are normal.   Cardiovascular: Normal rate, regular rhythm, normal heart sounds and intact distal pulses. Exam reveals no gallop and no friction rub. No murmur heard. Pulmonary/Chest: Effort normal and breath sounds normal. No respiratory distress. She has no wheezes. She has no rales. She exhibits no tenderness. Abdominal: Soft. Bowel sounds are normal. She exhibits no distension and no mass. There is no tenderness. There is no rebound and no guarding. Musculoskeletal: Normal range of motion. She exhibits no edema or tenderness. BLLE edema up through knees  Mid calf tenderness BL with no assymmetry    Neurological: She is oriented to person, place, and time. Skin: Skin is warm. Psychiatric: She has a normal mood and affect. Nursing note and vitals reviewed.       Diagnostic Study Results     Labs -     Recent Results (from the past 12 hour(s))   EKG, 12 LEAD, INITIAL    Collection Time: 06/21/18  9:24 PM   Result Value Ref Range    Ventricular Rate 89 BPM    Atrial Rate 89 BPM    P-R Interval 152 ms    QRS Duration 90 ms    Q-T Interval 390 ms    QTC Calculation (Bezet) 474 ms    Calculated P Axis 49 degrees    Calculated R Axis -15 degrees    Calculated T Axis 45 degrees    Diagnosis       Normal sinus rhythm  Possible Left atrial enlargement  When compared with ECG of 05-JUN-2018 15:38,  No significant change was found     CBC WITH AUTOMATED DIFF    Collection Time: 06/21/18  9:32 PM   Result Value Ref Range    WBC 5.9 3.6 - 11.0 K/uL    RBC 5.05 3.80 - 5.20 M/uL    HGB 10.1 (L) 11.5 - 16.0 g/dL    HCT 35.1 35.0 - 47.0 %    MCV 69.5 (L) 80.0 - 99.0 FL    MCH 20.0 (L) 26.0 - 34.0 PG    MCHC 28.8 (L) 30.0 - 36.5 g/dL    RDW 24.4 (H) 11.5 - 14.5 %    PLATELET 815 944 - 928 K/uL    MPV 9.4 8.9 - 12.9 FL    NRBC 0.0 0  WBC    ABSOLUTE NRBC 0.00 0.00 - 0.01 K/uL    NEUTROPHILS 44 32 - 75 %    LYMPHOCYTES 42 12 - 49 %    MONOCYTES 8 5 - 13 %    EOSINOPHILS 4 0 - 7 %    BASOPHILS 1 0 - 1 %    IMMATURE GRANULOCYTES 1 (H) 0.0 - 0.5 %    ABS. NEUTROPHILS 2.5 1.8 - 8.0 K/UL    ABS. LYMPHOCYTES 2.5 0.8 - 3.5 K/UL    ABS. MONOCYTES 0.5 0.0 - 1.0 K/UL    ABS. EOSINOPHILS 0.2 0.0 - 0.4 K/UL    ABS. BASOPHILS 0.1 0.0 - 0.1 K/UL    ABS. IMM. GRANS. 0.1 (H) 0.00 - 0.04 K/UL    DF SMEAR SCANNED      RBC COMMENTS ANISOCYTOSIS  1+        RBC COMMENTS MICROCYTOSIS  2+        RBC COMMENTS HYPOCHROMIA  2+        RBC COMMENTS TARGET CELLS  2+       METABOLIC PANEL, COMPREHENSIVE    Collection Time: 06/21/18  9:32 PM   Result Value Ref Range    Sodium 141 136 - 145 mmol/L    Potassium 3.5 3.5 - 5.1 mmol/L    Chloride 102 97 - 108 mmol/L    CO2 27 21 - 32 mmol/L    Anion gap 12 5 - 15 mmol/L    Glucose 137 (H) 65 - 100 mg/dL    BUN 6 6 - 20 MG/DL    Creatinine 0.98 0.55 - 1.02 MG/DL    BUN/Creatinine ratio 6 (L) 12 - 20      GFR est AA >60 >60 ml/min/1.73m2    GFR est non-AA >60 >60 ml/min/1.73m2    Calcium 8.8 8.5 - 10.1 MG/DL    Bilirubin, total 0.8 0.2 - 1.0 MG/DL    ALT (SGPT) 58 12 - 78 U/L    AST (SGOT) 141 (H) 15 - 37 U/L    Alk.  phosphatase 82 45 - 117 U/L    Protein, total 8.9 (H) 6.4 - 8.2 g/dL    Albumin 3.6 3.5 - 5.0 g/dL    Globulin 5.3 (H) 2.0 - 4.0 g/dL    A-G Ratio 0.7 (L) 1.1 - 2.2     NT-PRO BNP    Collection Time: 06/21/18  9:32 PM   Result Value Ref Range    NT pro-BNP 16 0 - 125 PG/ML   TROPONIN I    Collection Time: 06/21/18  9:32 PM   Result Value Ref Range    Troponin-I, Qt. <0.05 <0.05 ng/mL   CK W/ CKMB & INDEX    Collection Time: 06/21/18  9:32 PM   Result Value Ref Range     26 - 192 U/L    CK - MB 1.8 <3.6 NG/ML    CK-MB Index 0.9 0 - 2.5     URINALYSIS W/ REFLEX CULTURE    Collection Time: 06/21/18  9:40 PM   Result Value Ref Range    Color PINK      Appearance CLOUDY (A) CLEAR      Specific gravity 1.007 1.003 - 1.030      pH (UA) 5.5 5.0 - 8.0      Protein 30 (A) NEG mg/dL    Glucose NEGATIVE  NEG mg/dL    Ketone NEGATIVE  NEG mg/dL    Bilirubin NEGATIVE  NEG      Blood LARGE (A) NEG      Urobilinogen 0.2 0.2 - 1.0 EU/dL    Nitrites NEGATIVE  NEG      Leukocyte Esterase SMALL (A) NEG      UA:UC IF INDICATED URINE CULTURE ORDERED (A) CNI      WBC 5-10 0 - 4 /hpf    RBC >100 (H) 0 - 5 /hpf    Epithelial cells FEW FEW /lpf    Bacteria NEGATIVE  NEG /hpf    Hyaline cast 0-2 0 - 5 /lpf       Radiologic Studies -   DUPLEX LOWER EXT VENOUS BILAT   Final Result   INDICATION:  Leg swelling, pain, DVT suspected      EXAMINATION:  US DOPPLER VENOUS EXTREMITY, BILATERAL     COMPARISON: June 5, 2015     FINDINGS:     Duplex venous Doppler examination was performed from the bilateral inguinal  ligaments to the mid-calf regions. Spectral analysis and color wave-form  imaging were performed. There is no deep venous thrombosis. There is a slightly  complex right popliteal fossa cyst measuring 4.6 x 2.0 x 2.4 cm.     IMPRESSION  IMPRESSION:     No deep venous thrombosis. Right popliteal fossa cyst as above. CT Results  (Last 48 hours)    None        CXR Results  (Last 48 hours)    None            Medical Decision Making   I am the first provider for this patient. I reviewed the vital signs, available nursing notes, past medical history, past surgical history, family history and social history. Vital Signs-Reviewed the patient's vital signs.   Patient Vitals for the past 12 hrs:   Temp Pulse Resp BP SpO2   06/21/18 2116 98.4 °F (36.9 °C) 95 20 164/76 99 %       Pulse Oximetry Analysis - 99% on RA    Cardiac Monitor:   Rate: 95 bpm  Rhythm: Normal Sinus Rhythm      EKG interpretation: (Preliminary)2130  Rhythm: normal sinus rhythm; and regular . Rate (approx.): 89; Axis: normal; UT interval: normal; QRS interval: normal ; ST/T wave: normal; Other findings: possible left atrial enlargement when compared with ECG of 05 June 2018 1538. Written by Rolanda Jerry ED Scribe, as dictated by Jordyn Barry MD.    Records Reviewed: Nursing Notes, Old Medical Records, Previous electrocardiograms, Ambulance Run Sheet, Previous Radiology Studies and Previous Laboratory Studies    Provider Notes (Medical Decision Making):   Medication non-compliance. Pt stopped clonidine suddenly with worsening of leg swelling subsequent to cessation of medication. DVT cannot be excluded. Will need BL doppler, basic labs. Pt continues to drink alcohol although states had decreased volume to 2 beers a day. Not currently intoxicated. Lower extremity edema may be due to chronic alcohol use and concomitant with anasarca. ED Course:   Initial assessment performed. The patients presenting problems have been discussed, and they are in agreement with the care plan formulated and outlined with them. I have encouraged them to ask questions as they arise throughout their visit. Progress note: 3:30  Pt was resting comfortably at time of reassessment. Critical Care Time:   0 minutes    Disposition:  Discharge Note:  4:38 AM  The pt is ready for discharge. The pt's signs, symptoms, diagnosis, and discharge instructions have been discussed and pt has conveyed their understanding. The pt is to follow up as recommended or return to ER should their symptoms worsen. Plan has been discussed and pt is in agreement. PLAN:  1. Current Discharge Medication List        2.    Follow-up Information     Follow up With Details Comments Contact Bailey Mata MD In 3 days  [address] [facility] [id number] [phone] Return to ED if worse     Diagnosis     Clinical Impression:   1. Leg swelling    2. Hypertension, unspecified type    3. Noncompliance with medication regimen        Attestations: This note is prepared by Tesha Curtis. Daniela Cortes, acting as Scribe for Dominic Vegas MD.    Dominic Vegas MD: The scribe's documentation has been prepared under my direction and personally reviewed by me in its entirety. I confirm that the note above accurately reflects all work, treatment, procedures, and medical decision making performed by me.

## 2018-06-22 NOTE — ED NOTES
Pt c/o abdominal pain and requesting to s/w MD Tito Aranda. MD Tito Aranda at bedside. Pt and family requesting voucher. Informed no longer provider vouchers. Reports some nausea but no vomiting observed.

## 2018-06-23 LAB
BACTERIA SPEC CULT: ABNORMAL
BACTERIA SPEC CULT: ABNORMAL
CC UR VC: ABNORMAL
SERVICE CMNT-IMP: ABNORMAL

## 2018-10-15 ENCOUNTER — HOSPITAL ENCOUNTER (EMERGENCY)
Age: 43
Discharge: HOME | End: 2018-10-15
Attending: EMERGENCY MEDICINE | Admitting: EMERGENCY MEDICINE
Payer: MEDICAID

## 2018-10-15 VITALS
WEIGHT: 260 LBS | DIASTOLIC BLOOD PRESSURE: 99 MMHG | OXYGEN SATURATION: 100 % | SYSTOLIC BLOOD PRESSURE: 162 MMHG | HEART RATE: 114 BPM | TEMPERATURE: 98.3 F | BODY MASS INDEX: 46.07 KG/M2 | RESPIRATION RATE: 20 BRPM | HEIGHT: 63 IN

## 2018-10-15 DIAGNOSIS — S91.319A LACERATION OF FOOT, UNSPECIFIED LATERALITY, INITIAL ENCOUNTER: Primary | ICD-10-CM

## 2018-10-15 PROCEDURE — 74011250636 HC RX REV CODE- 250/636: Performed by: EMERGENCY MEDICINE

## 2018-10-15 PROCEDURE — 99282 EMERGENCY DEPT VISIT SF MDM: CPT

## 2018-10-15 PROCEDURE — 90715 TDAP VACCINE 7 YRS/> IM: CPT | Performed by: EMERGENCY MEDICINE

## 2018-10-15 PROCEDURE — 90471 IMMUNIZATION ADMIN: CPT

## 2018-10-15 RX ORDER — CEPHALEXIN 500 MG/1
500 CAPSULE ORAL 3 TIMES DAILY
Qty: 15 CAP | Refills: 0 | Status: SHIPPED | OUTPATIENT
Start: 2018-10-15 | End: 2019-02-10 | Stop reason: ALTCHOICE

## 2018-10-15 RX ADMIN — TETANUS TOXOID, REDUCED DIPHTHERIA TOXOID AND ACELLULAR PERTUSSIS VACCINE, ADSORBED 0.5 ML: 5; 2.5; 8; 8; 2.5 SUSPENSION INTRAMUSCULAR at 12:06

## 2018-10-15 NOTE — DISCHARGE INSTRUCTIONS
Cuts Left Open: Care Instructions  Your Care Instructions    A cut can happen anywhere on your body. Sometimes a cut can injure the tendons, blood vessels, or nerves. A cut may be left open instead of being closed with stitches, staples, or adhesive. A cut may be left open when it is likely to become infected, because closing it can make infection even more likely. You will probably have a bandage. The doctor may want the cut to stay open the whole time it heals. This happens with some cuts when too much time has gone by since the cut happened. Or the doctor may tell you to come back to have the cut closed in 4 to 5 days, when there is less chance of infection. If the cut stays open while healing, your scar may be larger than if the cut was closed. But you can get treatment later to make the scar smaller. The doctor has checked you carefully, but problems can develop later. If you notice any problems or new symptoms, get medical treatment right away. Follow-up care is a key part of your treatment and safety. Be sure to make and go to all appointments, and call your doctor if you are having problems. It's also a good idea to know your test results and keep a list of the medicines you take. How can you care for yourself at home? · Keep the cut dry for the first 24 to 48 hours. After this, you can shower if your doctor okays it. Pat the cut dry. · Don't soak the cut, such as in a bathtub. Your doctor will tell you when it's safe to get the cut wet. · If your doctor told you how to care for your cut, follow your doctor's instructions. If you did not get instructions, follow this general advice:  ¨ After the first 24 to 48 hours, wash the cut with clean water 2 times a day. Don't use hydrogen peroxide or alcohol, which can slow healing. ¨ You may cover the cut with a thin layer of petroleum jelly, such as Vaseline, and a nonstick bandage.   ¨ Apply more petroleum jelly and replace the bandage as needed. · Prop up the injured area on a pillow anytime you sit or lie down during the next 3 days. Try to keep it above the level of your heart. This will help reduce swelling. · Avoid any activity that could cause your cut to get worse. · Take pain medicines exactly as directed. ¨ If the doctor gave you a prescription medicine for pain, take it as prescribed. ¨ If you are not taking a prescription pain medicine, ask your doctor if you can take an over-the-counter medicine. When should you call for help? Call your doctor now or seek immediate medical care if:    · You have new pain, or your pain gets worse.     · The cut starts to bleed, and blood soaks through the bandage. Oozing small amounts of blood is normal.     · The skin near the cut is cold or pale or changes color.     · You have tingling, weakness, or numbness near the cut.     · You have trouble moving the area near the cut.     · You have symptoms of infection, such as:  ¨ Increased pain, swelling, warmth, or redness around the cut. ¨ Red streaks leading from the cut. ¨ Pus draining from the cut. ¨ A fever.    Watch closely for changes in your health, and be sure to contact your doctor if:    · The cut is not closing (getting smaller).     · You do not get better as expected. Where can you learn more? Go to http://estefania-gian.info/. Enter [id number] in the search box to learn more about \"Cuts Left Open: Care Instructions. \"  Current as of: November 20, 2017  Content Version: 11.8  © 3939-3192 [employer]. Care instructions adapted under license by [employer] (which disclaims liability or warranty for this information). If you have questions about a medical condition or this instruction, always ask your healthcare professional. [place] [address] any warranty or liability for your use of this information.

## 2018-10-15 NOTE — ED PROVIDER NOTES
EMERGENCY DEPARTMENT HISTORY AND PHYSICAL EXAM 
 
 
Date: 10/15/2018 Patient Name: Alexx Meeks History of Presenting Illness Chief Complaint Patient presents with  Foot Pain  
  stepped on metal object on friday; complaining of right foot and leg pain. Pt is a diabetic. History Provided By: Patient HPI: Alexx Meeks, 38 y.o. female with PMHx significant for Asthma, HTN, DM, depression, presents ambulatory to the ED with cc of small laceration to the right 3rd toe for the past 3 days. Pt was walking her dog when she stepped on a metal hoe and cut her toe. She is concerned for the cut stating she is a diabetic. She denies any pain or any other injury She is not UTD on her tetanus shot and needs a booster. She denies any associated symptoms. She denies any fever, chills, HA, CP, SOB. Social Hx: - Tobacco (-), + EtOH (2-3 36'S a day), + illicit drug use (cocaine) There are no other complaints, changes, or physical findings at this time. PCP: Arvil Cabot, NP Current Facility-Administered Medications Medication Dose Route Frequency Provider Last Rate Last Dose  diph,Pertuss(AC),Tet Vac-PF (BOOSTRIX) suspension 0.5 mL  0.5 mL IntraMUSCular PRIOR TO DISCHARGE Rosalee Fernández MD      
 
Current Outpatient Prescriptions Medication Sig Dispense Refill  cephALEXin (KEFLEX) 500 mg capsule Take 1 Cap by mouth three (3) times daily. 15 Cap 0  
 ketotifen (ZADITOR) 0.025 % (0.035 %) ophthalmic solution Administer 1 Drop to both eyes two (2) times daily as needed (ALLERGIES).  cetirizine (ZYRTEC) 10 mg tablet Take 10 mg by mouth daily as needed for Allergies.  OLANZapine (ZYPREXA) 10 mg tablet Take 10 mg by mouth nightly.  verapamil ER (VERELAN) 120 mg ER capsule Take 120 mg by mouth daily.  hydrOXYzine pamoate (VISTARIL) 25 mg capsule Take 25 mg by mouth three (3) times daily as needed for Itching.  albuterol (PROVENTIL, VENTOLIN) 90 mcg/actuation inhaler Take 1-2 Puffs by inhalation every four (4) hours as needed for Wheezing or Shortness of Breath. 17 g 0  
 fluticasone-salmeterol (ADVAIR DISKUS) 250-50 mcg/dose diskus inhaler Take 1 Puff by inhalation two (2) times a day. Past History Past Medical History: 
Past Medical History:  
Diagnosis Date  Asthma  Diabetes ([address]  Hypertension  Psychiatric disorder   
 depression  Sleep apnea Past Surgical History: 
Past Surgical History:  
Procedure Laterality Date  HX TUBAL LIGATION Family History: 
Family History Problem Relation Age of Onset  Alcohol abuse Mother  Alcohol abuse Father Social History: 
Social History Substance Use Topics  Smoking status: Never Smoker  Smokeless tobacco: Never Used  Alcohol use Yes Comment: 2 or 3 40's a day 7/18/17 Allergies: Allergies Allergen Reactions  Aspirin Rash Patient denies allergy, sometimes itches  Lisinopril Swelling Review of Systems Review of Systems Constitutional: Negative for chills and fever. HENT: Negative for congestion, rhinorrhea, sneezing and sore throat. Eyes: Negative for visual disturbance. Respiratory: Negative for shortness of breath. Cardiovascular: Negative for chest pain. Gastrointestinal: Negative for abdominal pain, nausea and vomiting. Genitourinary: Negative for dysuria. Musculoskeletal: Negative for back pain, myalgias and neck stiffness. Skin: Positive for wound (laceration). Negative for rash. Allergic/Immunologic: Negative for immunocompromised state. Neurological: Negative for dizziness, weakness and headaches. All other systems reviewed and are negative. Physical Exam  
Physical Exam  
 
GENERAL: alert and oriented, no acute distress EYES: PEERL, No injection, discharge or icterus. HENT: Mucous membranes pink and moist. 
NECK: Supple LUNGS: Airway patent. Non-labored respirations. Breath sounds clear with good air entry bilaterally. HEART: tachycardic 110. No peripheral edema ABDOMEN: Non-distended and non-tender, without guarding or rebound. SKIN:  warm, dry, very small laceration at the base of the right 3rd toe MSK/ EXTREMITIES: Without swelling, tenderness or deformity, symmetric with normal ROM 
NEUROLOGICAL: Alert, oriented Diagnostic Study Results Labs - No results found for this or any previous visit (from the past 12 hour(s)). Radiologic Studies - No orders to display CT Results  (Last 48 hours) None CXR Results  (Last 48 hours) None Medical Decision Making I am the first provider for this patient. I reviewed the vital signs, available nursing notes, past medical history, past surgical history, family history and social history. Vital Signs-Reviewed the patient's vital signs. Patient Vitals for the past 12 hrs: 
 Temp Pulse Resp BP SpO2  
10/15/18 1100 - (!) 114 20 (!) 183/114 100 % 10/15/18 1045 98.3 °F (36.8 °C) (!) 119 24 (!) 180/130 100 % Records Reviewed: Nursing Notes, Old Medical Records, Previous Radiology Studies and Previous Laboratory Studies Provider Notes (Medical Decision Making): On presentation the patient is well appearing, in no acute distress with reassurnig vital signs. Based on the history and exam the differential diagnosis for this patient includes laceration, cellulitis. On exam there is a small laceration at the base of the 3rd toe, unable to repair given time coarse. No signs of FB in wound or significant infection. As she is diabetic and this is a dirty wound . Will start on course of keflex and tetanus updated. ED Course:  
Initial assessment performed.  The patients presenting problems have been discussed, and they are in agreement with the care plan formulated and outlined with them. I have encouraged them to ask questions as they arise throughout their visit. HYPERTENSION COUNSELING:  
Patient denies any current chest pain, headache, shortness of breath, lightheadedness, dizziness, or changes in vision. Patient is made aware of their elevated blood pressure and is instructed to follow up this week with their Primary Care for a recheck. Patient is counseled regarding consequences of chronic, uncontrolled hypertension including kidney disease, heart disease, stroke or even death. Patient verbally states their understanding. Critical Care Time:  
None. Disposition: 
DISCHARGE NOTE 
11:48 AM 
The patient has been re-evaluated and is ready for discharge. Reviewed available results with patient. Counseled pt on diagnosis and care plan. Pt has expressed understanding, and all questions have been answered. Pt agrees with plan and agrees to F/U as recommended, or return to the ED if their sxs worsen. Discharge instructions have been provided and explained to the pt, along with reasons to return to the ED. PLAN: 
1. Current Discharge Medication List  
  
START taking these medications Details  
cephALEXin (KEFLEX) 500 mg capsule Take 1 Cap by mouth three (3) times daily. Qty: 15 Cap, Refills: 0  
  
  
 
2. Follow-up Information Follow up With Details Comments Contact Info Cony Tatum NP Schedule an appointment as soon as possible for a visit in 2 days  [address] [facility] [address] [phone] Return to ED if worse Diagnosis Clinical Impression: 1. Laceration of foot, unspecified laterality, initial encounter 2. htn Attestations: This note is prepared by Lyle Mancini acting as Scribe for [employer]. MD Mary Ann Rowley MD : The scribe's documentation has been prepared under my direction and personally reviewed by me in its entirety.  I confirm that the note above accurately reflects all work, treatment, procedures, and medical decision making performed by me.

## 2018-10-15 NOTE — ED NOTES
Pt given printed discharge instructions and 1 script(s). Pt verbalized understanding of instructions and script(s). Pt verbalized importance of following up with PCP. Pt alert and oriented, in no acute distress, ambulatory with .

## 2018-10-15 NOTE — ED TRIAGE NOTES
Pt states, \"I was walking my dog on Friday (4 days ago) and I stepped on a hoe and I diabetic, so, I just want to make sure it's not infected. \" 
 
Pt unsure of tetanus status.

## 2019-02-09 ENCOUNTER — HOSPITAL ENCOUNTER (INPATIENT)
Age: 44
LOS: 4 days | Discharge: HOME | DRG: 241 | End: 2019-02-13
Attending: EMERGENCY MEDICINE | Admitting: HOSPITALIST
Payer: MEDICAID

## 2019-02-09 DIAGNOSIS — K85.20 ALCOHOL-INDUCED ACUTE PANCREATITIS, UNSPECIFIED COMPLICATION STATUS: Primary | ICD-10-CM

## 2019-02-09 DIAGNOSIS — K92.0 HEMATEMESIS WITH NAUSEA: ICD-10-CM

## 2019-02-09 PROBLEM — K29.20 ALCOHOLIC GASTRITIS: Status: ACTIVE | Noted: 2019-02-09

## 2019-02-09 LAB
ALBUMIN SERPL-MCNC: 3.8 G/DL (ref 3.5–5)
ALBUMIN/GLOB SERPL: 0.7 {RATIO} (ref 1.1–2.2)
ALP SERPL-CCNC: 118 U/L (ref 45–117)
ALT SERPL-CCNC: 52 U/L (ref 12–78)
AMPHET UR QL SCN: NEGATIVE
ANION GAP SERPL CALC-SCNC: 12 MMOL/L (ref 5–15)
APPEARANCE UR: CLEAR
AST SERPL-CCNC: 139 U/L (ref 15–37)
BACTERIA URNS QL MICRO: NEGATIVE /HPF
BARBITURATES UR QL SCN: NEGATIVE
BASOPHILS # BLD: 0 K/UL (ref 0–0.1)
BASOPHILS NFR BLD: 1 % (ref 0–1)
BENZODIAZ UR QL: NEGATIVE
BILIRUB SERPL-MCNC: 1.7 MG/DL (ref 0.2–1)
BILIRUB UR QL: NEGATIVE
BUN SERPL-MCNC: 4 MG/DL (ref 6–20)
BUN/CREAT SERPL: 5 (ref 12–20)
CALCIUM SERPL-MCNC: 9 MG/DL (ref 8.5–10.1)
CANNABINOIDS UR QL SCN: NEGATIVE
CHLORIDE SERPL-SCNC: 98 MMOL/L (ref 97–108)
CO2 SERPL-SCNC: 24 MMOL/L (ref 21–32)
COCAINE UR QL SCN: NEGATIVE
COLOR UR: ABNORMAL
CREAT SERPL-MCNC: 0.75 MG/DL (ref 0.55–1.02)
DIFFERENTIAL METHOD BLD: ABNORMAL
DRUG SCRN COMMENT,DRGCM: NORMAL
EOSINOPHIL # BLD: 0 K/UL (ref 0–0.4)
EOSINOPHIL NFR BLD: 0 % (ref 0–7)
EPITH CASTS URNS QL MICRO: ABNORMAL /LPF
ERYTHROCYTE [DISTWIDTH] IN BLOOD BY AUTOMATED COUNT: 17.4 % (ref 11.5–14.5)
ETHANOL SERPL-MCNC: 25 MG/DL
GLOBULIN SER CALC-MCNC: 5.4 G/DL (ref 2–4)
GLUCOSE SERPL-MCNC: 112 MG/DL (ref 65–100)
GLUCOSE UR STRIP.AUTO-MCNC: NEGATIVE MG/DL
HCT VFR BLD AUTO: 37.2 % (ref 35–47)
HGB BLD-MCNC: 11.6 G/DL (ref 11.5–16)
HGB UR QL STRIP: NEGATIVE
IMM GRANULOCYTES # BLD AUTO: 0.1 K/UL (ref 0–0.04)
IMM GRANULOCYTES NFR BLD AUTO: 2 % (ref 0–0.5)
KETONES UR QL STRIP.AUTO: ABNORMAL MG/DL
LEUKOCYTE ESTERASE UR QL STRIP.AUTO: NEGATIVE
LIPASE SERPL-CCNC: 507 U/L (ref 73–393)
LYMPHOCYTES # BLD: 2.6 K/UL (ref 0.8–3.5)
LYMPHOCYTES NFR BLD: 43 % (ref 12–49)
MCH RBC QN AUTO: 23.7 PG (ref 26–34)
MCHC RBC AUTO-ENTMCNC: 31.2 G/DL (ref 30–36.5)
MCV RBC AUTO: 76.1 FL (ref 80–99)
METHADONE UR QL: NEGATIVE
MONOCYTES # BLD: 0.5 K/UL (ref 0–1)
MONOCYTES NFR BLD: 8 % (ref 5–13)
NEUTS SEG # BLD: 2.7 K/UL (ref 1.8–8)
NEUTS SEG NFR BLD: 46 % (ref 32–75)
NITRITE UR QL STRIP.AUTO: NEGATIVE
NRBC # BLD: 0 K/UL (ref 0–0.01)
NRBC BLD-RTO: 0 PER 100 WBC
OPIATES UR QL: NEGATIVE
PCP UR QL: NEGATIVE
PH UR STRIP: 7 [PH] (ref 5–8)
PLATELET # BLD AUTO: 105 K/UL (ref 150–400)
POTASSIUM SERPL-SCNC: 4.7 MMOL/L (ref 3.5–5.1)
PROT SERPL-MCNC: 9.2 G/DL (ref 6.4–8.2)
PROT UR STRIP-MCNC: 30 MG/DL
RBC # BLD AUTO: 4.89 M/UL (ref 3.8–5.2)
RBC #/AREA URNS HPF: ABNORMAL /HPF (ref 0–5)
SODIUM SERPL-SCNC: 134 MMOL/L (ref 136–145)
SP GR UR REFRACTOMETRY: 1.01 (ref 1–1.03)
UA: UC IF INDICATED,UAUC: ABNORMAL
UROBILINOGEN UR QL STRIP.AUTO: 2 EU/DL (ref 0.2–1)
WBC # BLD AUTO: 5.9 K/UL (ref 3.6–11)
WBC URNS QL MICRO: ABNORMAL /HPF (ref 0–4)

## 2019-02-09 PROCEDURE — 85025 COMPLETE CBC W/AUTO DIFF WBC: CPT

## 2019-02-09 PROCEDURE — 96374 THER/PROPH/DIAG INJ IV PUSH: CPT

## 2019-02-09 PROCEDURE — 65270000029 HC RM PRIVATE

## 2019-02-09 PROCEDURE — 36415 COLL VENOUS BLD VENIPUNCTURE: CPT

## 2019-02-09 PROCEDURE — 96361 HYDRATE IV INFUSION ADD-ON: CPT

## 2019-02-09 PROCEDURE — 80307 DRUG TEST PRSMV CHEM ANLYZR: CPT

## 2019-02-09 PROCEDURE — 83690 ASSAY OF LIPASE: CPT

## 2019-02-09 PROCEDURE — 74011250636 HC RX REV CODE- 250/636: Performed by: INTERNAL MEDICINE

## 2019-02-09 PROCEDURE — 96375 TX/PRO/DX INJ NEW DRUG ADDON: CPT

## 2019-02-09 PROCEDURE — 80053 COMPREHEN METABOLIC PANEL: CPT

## 2019-02-09 PROCEDURE — 74011250636 HC RX REV CODE- 250/636: Performed by: NURSE PRACTITIONER

## 2019-02-09 PROCEDURE — 74011250637 HC RX REV CODE- 250/637: Performed by: NURSE PRACTITIONER

## 2019-02-09 PROCEDURE — 74011000250 HC RX REV CODE- 250: Performed by: INTERNAL MEDICINE

## 2019-02-09 PROCEDURE — 74011250637 HC RX REV CODE- 250/637: Performed by: HOSPITALIST

## 2019-02-09 PROCEDURE — 99284 EMERGENCY DEPT VISIT MOD MDM: CPT

## 2019-02-09 PROCEDURE — 81001 URINALYSIS AUTO W/SCOPE: CPT

## 2019-02-09 RX ORDER — SODIUM CHLORIDE 0.9 % (FLUSH) 0.9 %
5-40 SYRINGE (ML) INJECTION EVERY 8 HOURS
Status: DISCONTINUED | OUTPATIENT
Start: 2019-02-09 | End: 2019-02-13 | Stop reason: HOSPADM

## 2019-02-09 RX ORDER — MAGNESIUM SULFATE 100 %
4 CRYSTALS MISCELLANEOUS AS NEEDED
Status: DISCONTINUED | OUTPATIENT
Start: 2019-02-09 | End: 2019-02-13 | Stop reason: HOSPADM

## 2019-02-09 RX ORDER — ACETAMINOPHEN 325 MG/1
650 TABLET ORAL
Status: COMPLETED | OUTPATIENT
Start: 2019-02-09 | End: 2019-02-09

## 2019-02-09 RX ORDER — INSULIN LISPRO 100 [IU]/ML
INJECTION, SOLUTION INTRAVENOUS; SUBCUTANEOUS
Status: DISCONTINUED | OUTPATIENT
Start: 2019-02-10 | End: 2019-02-13 | Stop reason: HOSPADM

## 2019-02-09 RX ORDER — DEXTROSE 50 % IN WATER (D50W) INTRAVENOUS SYRINGE
12.5-25 AS NEEDED
Status: DISCONTINUED | OUTPATIENT
Start: 2019-02-09 | End: 2019-02-13 | Stop reason: HOSPADM

## 2019-02-09 RX ORDER — LORAZEPAM 1 MG/1
1 TABLET ORAL
Status: COMPLETED | OUTPATIENT
Start: 2019-02-09 | End: 2019-02-09

## 2019-02-09 RX ORDER — ONDANSETRON 2 MG/ML
4 INJECTION INTRAMUSCULAR; INTRAVENOUS
Status: COMPLETED | OUTPATIENT
Start: 2019-02-09 | End: 2019-02-09

## 2019-02-09 RX ORDER — FAMOTIDINE 10 MG/ML
20 INJECTION INTRAVENOUS
Status: DISCONTINUED | OUTPATIENT
Start: 2019-02-09 | End: 2019-02-09 | Stop reason: SDUPTHER

## 2019-02-09 RX ORDER — SODIUM CHLORIDE 0.9 % (FLUSH) 0.9 %
5-40 SYRINGE (ML) INJECTION AS NEEDED
Status: DISCONTINUED | OUTPATIENT
Start: 2019-02-09 | End: 2019-02-13 | Stop reason: HOSPADM

## 2019-02-09 RX ORDER — HYDRALAZINE HYDROCHLORIDE 20 MG/ML
20 INJECTION INTRAMUSCULAR; INTRAVENOUS
Status: DISCONTINUED | OUTPATIENT
Start: 2019-02-09 | End: 2019-02-11

## 2019-02-09 RX ORDER — SODIUM CHLORIDE 0.9 % (FLUSH) 0.9 %
5-40 SYRINGE (ML) INJECTION AS NEEDED
Status: DISCONTINUED | OUTPATIENT
Start: 2019-02-09 | End: 2019-02-11 | Stop reason: SDUPTHER

## 2019-02-09 RX ORDER — CLONIDINE HYDROCHLORIDE 0.1 MG/1
0.1 TABLET ORAL
Status: COMPLETED | OUTPATIENT
Start: 2019-02-09 | End: 2019-02-09

## 2019-02-09 RX ORDER — VERAPAMIL HYDROCHLORIDE 120 MG/1
120 TABLET, FILM COATED, EXTENDED RELEASE ORAL
Status: DISCONTINUED | OUTPATIENT
Start: 2019-02-09 | End: 2019-02-10

## 2019-02-09 RX ORDER — MORPHINE SULFATE 4 MG/ML
2 INJECTION INTRAVENOUS
Status: DISCONTINUED | OUTPATIENT
Start: 2019-02-09 | End: 2019-02-10

## 2019-02-09 RX ORDER — SODIUM CHLORIDE 0.9 % (FLUSH) 0.9 %
5-40 SYRINGE (ML) INJECTION EVERY 8 HOURS
Status: DISCONTINUED | OUTPATIENT
Start: 2019-02-09 | End: 2019-02-11 | Stop reason: SDUPTHER

## 2019-02-09 RX ADMIN — Medication 10 ML: at 22:00

## 2019-02-09 RX ADMIN — Medication 10 ML: at 23:33

## 2019-02-09 RX ADMIN — SODIUM CHLORIDE 1000 ML: 900 INJECTION, SOLUTION INTRAVENOUS at 19:42

## 2019-02-09 RX ADMIN — ONDANSETRON 4 MG: 2 SOLUTION INTRAMUSCULAR; INTRAVENOUS at 19:42

## 2019-02-09 RX ADMIN — ACETAMINOPHEN 650 MG: 325 TABLET, FILM COATED ORAL at 20:52

## 2019-02-09 RX ADMIN — LORAZEPAM 1 MG: 1 TABLET ORAL at 22:04

## 2019-02-09 RX ADMIN — CLONIDINE HYDROCHLORIDE 0.1 MG: 0.1 TABLET ORAL at 22:04

## 2019-02-09 RX ADMIN — SODIUM CHLORIDE 20 MG: 9 INJECTION INTRAMUSCULAR; INTRAVENOUS; SUBCUTANEOUS at 20:40

## 2019-02-09 RX ADMIN — VERAPAMIL HYDROCHLORIDE 120 MG: 120 TABLET, FILM COATED, EXTENDED RELEASE ORAL at 23:35

## 2019-02-09 NOTE — ED NOTES
Patient brought here by EMS with c/o abdominal pains. Patient also c/o nausea and recent vomiting this morning. Denies fevers. Denies changes in meds. Patient reports not being able to take her blood pressure medications this morning due to nausea and vomiting. Emergency Department Nursing Plan of Care The Nursing Plan of Care is developed from the Nursing assessment and Emergency Department Attending provider initial evaluation. The plan of care may be reviewed in the ED Provider note. The Plan of Care was developed with the following considerations:  
Patient / Family readiness to learn indicated by:verbalized understanding Persons(s) to be included in education: patient Barriers to Learning/Limitations:No 
 
Signed 707 RALPH Mata RN   
2/9/2019   6:08 PM

## 2019-02-10 LAB
ALBUMIN SERPL-MCNC: 3.3 G/DL (ref 3.5–5)
ALBUMIN/GLOB SERPL: 0.7 {RATIO} (ref 1.1–2.2)
ALP SERPL-CCNC: 104 U/L (ref 45–117)
ALT SERPL-CCNC: 39 U/L (ref 12–78)
ANION GAP SERPL CALC-SCNC: 11 MMOL/L (ref 5–15)
AST SERPL-CCNC: 94 U/L (ref 15–37)
BASOPHILS # BLD: 0 K/UL (ref 0–0.1)
BASOPHILS NFR BLD: 0 % (ref 0–1)
BILIRUB SERPL-MCNC: 1.9 MG/DL (ref 0.2–1)
BUN SERPL-MCNC: 6 MG/DL (ref 6–20)
BUN/CREAT SERPL: 7 (ref 12–20)
CALCIUM SERPL-MCNC: 8.5 MG/DL (ref 8.5–10.1)
CHLORIDE SERPL-SCNC: 104 MMOL/L (ref 97–108)
CO2 SERPL-SCNC: 24 MMOL/L (ref 21–32)
CREAT SERPL-MCNC: 0.86 MG/DL (ref 0.55–1.02)
DIFFERENTIAL METHOD BLD: ABNORMAL
EOSINOPHIL # BLD: 0 K/UL (ref 0–0.4)
EOSINOPHIL NFR BLD: 0 % (ref 0–7)
ERYTHROCYTE [DISTWIDTH] IN BLOOD BY AUTOMATED COUNT: 17.2 % (ref 11.5–14.5)
GLOBULIN SER CALC-MCNC: 4.8 G/DL (ref 2–4)
GLUCOSE BLD STRIP.AUTO-MCNC: 107 MG/DL (ref 65–100)
GLUCOSE BLD STRIP.AUTO-MCNC: 112 MG/DL (ref 65–100)
GLUCOSE BLD STRIP.AUTO-MCNC: 122 MG/DL (ref 65–100)
GLUCOSE BLD STRIP.AUTO-MCNC: 126 MG/DL (ref 65–100)
GLUCOSE BLD STRIP.AUTO-MCNC: 129 MG/DL (ref 65–100)
GLUCOSE SERPL-MCNC: 108 MG/DL (ref 65–100)
HCT VFR BLD AUTO: 34.8 % (ref 35–47)
HEMOCCULT STL QL: NEGATIVE
HGB BLD-MCNC: 10.6 G/DL (ref 11.5–16)
IMM GRANULOCYTES # BLD AUTO: 0.1 K/UL (ref 0–0.04)
IMM GRANULOCYTES NFR BLD AUTO: 2 % (ref 0–0.5)
LIPASE SERPL-CCNC: 488 U/L (ref 73–393)
LYMPHOCYTES # BLD: 2.2 K/UL (ref 0.8–3.5)
LYMPHOCYTES NFR BLD: 48 % (ref 12–49)
MCH RBC QN AUTO: 23.3 PG (ref 26–34)
MCHC RBC AUTO-ENTMCNC: 30.5 G/DL (ref 30–36.5)
MCV RBC AUTO: 76.5 FL (ref 80–99)
MONOCYTES # BLD: 0.4 K/UL (ref 0–1)
MONOCYTES NFR BLD: 9 % (ref 5–13)
NEUTS SEG # BLD: 2 K/UL (ref 1.8–8)
NEUTS SEG NFR BLD: 42 % (ref 32–75)
NRBC # BLD: 0.02 K/UL (ref 0–0.01)
NRBC BLD-RTO: 0.4 PER 100 WBC
PLATELET # BLD AUTO: 100 K/UL (ref 150–400)
PMV BLD AUTO: 9.9 FL (ref 8.9–12.9)
POTASSIUM SERPL-SCNC: 3.4 MMOL/L (ref 3.5–5.1)
PROT SERPL-MCNC: 8.1 G/DL (ref 6.4–8.2)
RBC # BLD AUTO: 4.55 M/UL (ref 3.8–5.2)
SERVICE CMNT-IMP: ABNORMAL
SODIUM SERPL-SCNC: 139 MMOL/L (ref 136–145)
WBC # BLD AUTO: 4.7 K/UL (ref 3.6–11)

## 2019-02-10 PROCEDURE — 74011000250 HC RX REV CODE- 250: Performed by: HOSPITALIST

## 2019-02-10 PROCEDURE — 74011250636 HC RX REV CODE- 250/636: Performed by: HOSPITALIST

## 2019-02-10 PROCEDURE — 82272 OCCULT BLD FECES 1-3 TESTS: CPT

## 2019-02-10 PROCEDURE — 80053 COMPREHEN METABOLIC PANEL: CPT

## 2019-02-10 PROCEDURE — C9113 INJ PANTOPRAZOLE SODIUM, VIA: HCPCS | Performed by: HOSPITALIST

## 2019-02-10 PROCEDURE — 74011250637 HC RX REV CODE- 250/637: Performed by: INTERNAL MEDICINE

## 2019-02-10 PROCEDURE — 36415 COLL VENOUS BLD VENIPUNCTURE: CPT

## 2019-02-10 PROCEDURE — 65270000032 HC RM SEMIPRIVATE

## 2019-02-10 PROCEDURE — 74011000258 HC RX REV CODE- 258: Performed by: HOSPITALIST

## 2019-02-10 PROCEDURE — 82962 GLUCOSE BLOOD TEST: CPT

## 2019-02-10 PROCEDURE — 74011250637 HC RX REV CODE- 250/637: Performed by: HOSPITALIST

## 2019-02-10 PROCEDURE — 85025 COMPLETE CBC W/AUTO DIFF WBC: CPT

## 2019-02-10 PROCEDURE — 83690 ASSAY OF LIPASE: CPT

## 2019-02-10 RX ORDER — ERGOCALCIFEROL 1.25 MG/1
1 CAPSULE ORAL
Refills: 0 | COMMUNITY
Start: 2018-12-27

## 2019-02-10 RX ORDER — CLONIDINE HYDROCHLORIDE 0.1 MG/1
0.1 TABLET ORAL 2 TIMES DAILY
Refills: 0 | COMMUNITY
Start: 2018-12-18

## 2019-02-10 RX ORDER — ALBUTEROL SULFATE 90 UG/1
2 AEROSOL, METERED RESPIRATORY (INHALATION)
Refills: 0 | COMMUNITY
Start: 2018-12-27

## 2019-02-10 RX ORDER — VERAPAMIL HYDROCHLORIDE 180 MG/1
180 TABLET, EXTENDED RELEASE ORAL
Status: DISCONTINUED | OUTPATIENT
Start: 2019-02-11 | End: 2019-02-10

## 2019-02-10 RX ORDER — HYDROCORTISONE 1 %
CREAM (GRAM) TOPICAL
Status: DISCONTINUED | OUTPATIENT
Start: 2019-02-10 | End: 2019-02-12 | Stop reason: SDUPTHER

## 2019-02-10 RX ORDER — POTASSIUM CHLORIDE 750 MG/1
40 TABLET, FILM COATED, EXTENDED RELEASE ORAL
Status: COMPLETED | OUTPATIENT
Start: 2019-02-10 | End: 2019-02-10

## 2019-02-10 RX ORDER — PANTOPRAZOLE SODIUM 40 MG/1
40 TABLET, DELAYED RELEASE ORAL DAILY
Refills: 0 | COMMUNITY
Start: 2018-12-27

## 2019-02-10 RX ORDER — MELOXICAM 15 MG/1
15 TABLET ORAL DAILY
Refills: 0 | COMMUNITY
Start: 2018-12-18

## 2019-02-10 RX ORDER — LORAZEPAM 2 MG/ML
2 INJECTION INTRAMUSCULAR
Status: DISCONTINUED | OUTPATIENT
Start: 2019-02-10 | End: 2019-02-13 | Stop reason: HOSPADM

## 2019-02-10 RX ORDER — ASPIRIN 325 MG
1 TABLET, DELAYED RELEASE (ENTERIC COATED) ORAL
Refills: 0 | COMMUNITY
Start: 2018-12-18

## 2019-02-10 RX ORDER — CALCIUM CARBONATE/VITAMIN D3 600 MG-10
100 TABLET ORAL DAILY
Refills: 0 | COMMUNITY
Start: 2018-12-18

## 2019-02-10 RX ORDER — LORATADINE 10 MG/1
10 TABLET ORAL
Status: COMPLETED | OUTPATIENT
Start: 2019-02-10 | End: 2019-02-10

## 2019-02-10 RX ORDER — DIPHENHYDRAMINE HCL 25 MG
25 CAPSULE ORAL
Status: COMPLETED | OUTPATIENT
Start: 2019-02-10 | End: 2019-02-10

## 2019-02-10 RX ORDER — FERROUS SULFATE 325(65) MG
1 TABLET, DELAYED RELEASE (ENTERIC COATED) ORAL DAILY
Refills: 0 | COMMUNITY
Start: 2018-12-27

## 2019-02-10 RX ORDER — ACETAMINOPHEN 325 MG/1
650 TABLET ORAL
Status: DISCONTINUED | OUTPATIENT
Start: 2019-02-10 | End: 2019-02-13 | Stop reason: HOSPADM

## 2019-02-10 RX ORDER — PANTOPRAZOLE SODIUM 40 MG/1
40 TABLET, DELAYED RELEASE ORAL DAILY
Status: DISCONTINUED | OUTPATIENT
Start: 2019-02-11 | End: 2019-02-11

## 2019-02-10 RX ORDER — VERAPAMIL HYDROCHLORIDE 120 MG/1
120 TABLET, FILM COATED, EXTENDED RELEASE ORAL
Status: DISCONTINUED | OUTPATIENT
Start: 2019-02-11 | End: 2019-02-13 | Stop reason: HOSPADM

## 2019-02-10 RX ORDER — LANOLIN ALCOHOL/MO/W.PET/CERES
6 CREAM (GRAM) TOPICAL
Status: DISCONTINUED | OUTPATIENT
Start: 2019-02-10 | End: 2019-02-13 | Stop reason: HOSPADM

## 2019-02-10 RX ORDER — LOSARTAN POTASSIUM 50 MG/1
50 TABLET ORAL DAILY
Refills: 0 | COMMUNITY
Start: 2018-12-27

## 2019-02-10 RX ORDER — CETIRIZINE HCL 10 MG
10 TABLET ORAL
Status: DISCONTINUED | OUTPATIENT
Start: 2019-02-10 | End: 2019-02-10 | Stop reason: CLARIF

## 2019-02-10 RX ORDER — LORAZEPAM 2 MG/ML
4 INJECTION INTRAMUSCULAR
Status: DISCONTINUED | OUTPATIENT
Start: 2019-02-10 | End: 2019-02-13 | Stop reason: HOSPADM

## 2019-02-10 RX ADMIN — MORPHINE SULFATE 2 MG: 4 INJECTION, SOLUTION INTRAMUSCULAR; INTRAVENOUS at 02:02

## 2019-02-10 RX ADMIN — Medication: at 12:34

## 2019-02-10 RX ADMIN — VERAPAMIL HYDROCHLORIDE 120 MG: 120 TABLET, FILM COATED, EXTENDED RELEASE ORAL at 08:57

## 2019-02-10 RX ADMIN — LORAZEPAM 2 MG: 2 INJECTION INTRAMUSCULAR; INTRAVENOUS at 17:38

## 2019-02-10 RX ADMIN — Medication 10 ML: at 16:07

## 2019-02-10 RX ADMIN — FOLIC ACID: 5 INJECTION, SOLUTION INTRAMUSCULAR; INTRAVENOUS; SUBCUTANEOUS at 22:57

## 2019-02-10 RX ADMIN — FOLIC ACID: 5 INJECTION, SOLUTION INTRAMUSCULAR; INTRAVENOUS; SUBCUTANEOUS at 00:03

## 2019-02-10 RX ADMIN — HYDROCORTISONE: 1 CREAM TOPICAL at 12:33

## 2019-02-10 RX ADMIN — Medication 10 ML: at 07:50

## 2019-02-10 RX ADMIN — LORAZEPAM 4 MG: 2 INJECTION INTRAMUSCULAR; INTRAVENOUS at 02:40

## 2019-02-10 RX ADMIN — LORATADINE 10 MG: 10 TABLET ORAL at 12:39

## 2019-02-10 RX ADMIN — HYDRALAZINE HYDROCHLORIDE 20 MG: 20 INJECTION INTRAMUSCULAR; INTRAVENOUS at 00:14

## 2019-02-10 RX ADMIN — DIPHENHYDRAMINE HYDROCHLORIDE 25 MG: 25 CAPSULE ORAL at 08:57

## 2019-02-10 RX ADMIN — ACETAMINOPHEN 650 MG: 325 TABLET, FILM COATED ORAL at 19:04

## 2019-02-10 RX ADMIN — MORPHINE SULFATE 2 MG: 4 INJECTION, SOLUTION INTRAMUSCULAR; INTRAVENOUS at 07:49

## 2019-02-10 RX ADMIN — MELATONIN 6 MG: 3 TAB ORAL at 21:41

## 2019-02-10 RX ADMIN — LORAZEPAM 2 MG: 2 INJECTION INTRAMUSCULAR; INTRAVENOUS at 08:35

## 2019-02-10 RX ADMIN — SODIUM CHLORIDE 40 MG: 9 INJECTION INTRAMUSCULAR; INTRAVENOUS; SUBCUTANEOUS at 08:48

## 2019-02-10 RX ADMIN — POTASSIUM CHLORIDE 40 MEQ: 750 TABLET, EXTENDED RELEASE ORAL at 06:26

## 2019-02-10 RX ADMIN — MELATONIN 6 MG: 3 TAB ORAL at 02:29

## 2019-02-10 RX ADMIN — ONDANSETRON 4 MG: 2 SOLUTION INTRAMUSCULAR; INTRAVENOUS at 08:29

## 2019-02-10 RX ADMIN — Medication 10 ML: at 21:41

## 2019-02-10 NOTE — PROGRESS NOTES
Pt came to floor. pt awake,alert with steady gait. pt assessed and continue care assumed. pt on seizure precaution,sezuire pad applied. call bell in reach,spouse at bedside. 1920 . Bedside and Verbal shift change report given to Dina Riley rn (oncoming nurse) by Pj Parks rn (offgoing nurse). Report included the following information SBAR, Kardex, Intake/Output, MAR, Recent Results and Med Rec Status.

## 2019-02-10 NOTE — ROUTINE PROCESS
I am consulted for intractable nausea and vomiting in setting of H/O chronic alcoholism presented with pinkish/red emesis. Differential include GI bleed vs liliana Taiwo tear, and further management will need GI evaluation. Considering [facility] doesn't have GI coverage, pt should be transferred to facility with available GI service.

## 2019-02-10 NOTE — ED PROVIDER NOTES
EMERGENCY DEPARTMENT HISTORY AND PHYSICAL EXAM 
 
Date: 2/9/2019 Patient Name: Christie Xie History of Presenting Illness Chief Complaint Patient presents with  Abdominal Pain  
  nausea and vomiting, unable to keep down BP meds History Provided By: Patient Chief Complaint: abdominal pain Duration: onset today Timing:  Acute Location: generalized abdomen Quality: Aching Severity: 9 out of 10 Modifying Factors: none Associated Symptoms: vomiting HPI: Christie Xie is a 37 y.o. female with a PMH of pancreatitis alcohol abuse who presents with abdominal pain onset today. States she thinks it is her pancreas. States she drank 2 beers today. Reports vomiting blood. Patient specifically denies fever fatigue chest pain shortness of breath  diarrhea dysuria numbness headache PCP: Miguel Angel Wallace NP Current Facility-Administered Medications Medication Dose Route Frequency Provider Last Rate Last Dose  sodium chloride (NS) flush 5-40 mL  5-40 mL IntraVENous Q8H Braulio Anderson NP      
 sodium chloride (NS) flush 5-40 mL  5-40 mL IntraVENous PRN Braulio Anderson NP Current Outpatient Medications Medication Sig Dispense Refill  cephALEXin (KEFLEX) 500 mg capsule Take 1 Cap by mouth three (3) times daily. 15 Cap 0  
 ketotifen (ZADITOR) 0.025 % (0.035 %) ophthalmic solution Administer 1 Drop to both eyes two (2) times daily as needed (ALLERGIES).  cetirizine (ZYRTEC) 10 mg tablet Take 10 mg by mouth daily as needed for Allergies.  OLANZapine (ZYPREXA) 10 mg tablet Take 10 mg by mouth nightly.  verapamil ER (VERELAN) 120 mg ER capsule Take 120 mg by mouth daily.  hydrOXYzine pamoate (VISTARIL) 25 mg capsule Take 25 mg by mouth three (3) times daily as needed for Itching.     
 albuterol (PROVENTIL, VENTOLIN) 90 mcg/actuation inhaler Take 1-2 Puffs by inhalation every four (4) hours as needed for Wheezing or Shortness of Breath. 17 g 0  
 fluticasone-salmeterol (ADVAIR DISKUS) 250-50 mcg/dose diskus inhaler Take 1 Puff by inhalation two (2) times a day. Past History Past Medical History: 
Past Medical History:  
Diagnosis Date  Asthma  Diabetes ([address]  Hypertension  Psychiatric disorder   
 depression  Sleep apnea Past Surgical History: 
Past Surgical History:  
Procedure Laterality Date  HX TUBAL LIGATION Family History: 
Family History Problem Relation Age of Onset  Alcohol abuse Mother  Alcohol abuse Father Social History: 
Social History Tobacco Use  Smoking status: Never Smoker  Smokeless tobacco: Never Used Substance Use Topics  Alcohol use: Yes Comment: 2 or 3 40's a day 7/18/17  Drug use: Yes Types: Cocaine Comment: using millicent since age 11, no rehab in 15 yrs. Allergies: Allergies Allergen Reactions  Aspirin Rash Patient denies allergy, sometimes itches  Lisinopril Swelling Review of Systems Review of Systems Constitutional: Negative for fatigue and fever. Respiratory: Negative for shortness of breath and wheezing. Cardiovascular: Negative for chest pain and palpitations. Gastrointestinal: Positive for abdominal pain, nausea and vomiting. Blood tinged emesis Musculoskeletal: Negative for arthralgias, myalgias, neck pain and neck stiffness. Skin: Negative for pallor and rash. Neurological: Negative for dizziness, tremors, weakness and headaches. Hematological: Negative for adenopathy. All other systems reviewed and are negative. Physical Exam  
 
Vitals:  
 02/09/19 1801 BP: (!) 191/112 Pulse: 86 Resp: 20 Temp: 98.6 °F (37 °C) SpO2: 99% Weight: 108.9 kg (240 lb) Height: 5' 3\" (1.6 m) Physical Exam  
Constitutional: She is oriented to person, place, and time. She appears well-developed and well-nourished. No distress.   
HENT:  
 Head: Normocephalic and atraumatic. Right Ear: External ear normal.  
Left Ear: External ear normal.  
Nose: Nose normal.  
Mouth/Throat: Oropharynx is clear and moist.  
Eyes: Conjunctivae are normal.  
Neck: Normal range of motion. Neck supple. Cardiovascular: Normal rate, regular rhythm and normal heart sounds. Pulmonary/Chest: Effort normal and breath sounds normal. No respiratory distress. She has no wheezes. Abdominal: Soft. Bowel sounds are normal. There is tenderness (generalized). Musculoskeletal: Normal range of motion. Lymphadenopathy:  
  She has no cervical adenopathy. Neurological: She is alert and oriented to person, place, and time. No cranial nerve deficit. Coordination normal.  
Skin: Skin is warm and dry. No rash noted. Psychiatric: She has a normal mood and affect. Her behavior is normal.  
Nursing note and vitals reviewed. Diagnostic Study Results Labs - Recent Results (from the past 12 hour(s)) CBC WITH AUTOMATED DIFF Collection Time: 02/09/19  7:30 PM  
Result Value Ref Range WBC 5.9 3.6 - 11.0 K/uL  
 RBC 4.89 3.80 - 5.20 M/uL  
 HGB 11.6 11.5 - 16.0 g/dL HCT 37.2 35.0 - 47.0 % MCV 76.1 (L) 80.0 - 99.0 FL  
 MCH 23.7 (L) 26.0 - 34.0 PG  
 MCHC 31.2 30.0 - 36.5 g/dL  
 RDW 17.4 (H) 11.5 - 14.5 % PLATELET 115 (L) 932 - 400 K/uL NRBC 0.0 0  WBC ABSOLUTE NRBC 0.00 0.00 - 0.01 K/uL NEUTROPHILS 46 32 - 75 % LYMPHOCYTES 43 12 - 49 % MONOCYTES 8 5 - 13 % EOSINOPHILS 0 0 - 7 % BASOPHILS 1 0 - 1 % IMMATURE GRANULOCYTES 2 (H) 0.0 - 0.5 % ABS. NEUTROPHILS 2.7 1.8 - 8.0 K/UL  
 ABS. LYMPHOCYTES 2.6 0.8 - 3.5 K/UL  
 ABS. MONOCYTES 0.5 0.0 - 1.0 K/UL  
 ABS. EOSINOPHILS 0.0 0.0 - 0.4 K/UL  
 ABS. BASOPHILS 0.0 0.0 - 0.1 K/UL  
 ABS. IMM. GRANS. 0.1 (H) 0.00 - 0.04 K/UL  
 DF AUTOMATED METABOLIC PANEL, COMPREHENSIVE Collection Time: 02/09/19  7:30 PM  
Result Value Ref Range  Sodium 134 (L) 136 - 145 mmol/L  
 Potassium 4.7 3.5 - 5.1 mmol/L Chloride 98 97 - 108 mmol/L  
 CO2 24 21 - 32 mmol/L Anion gap 12 5 - 15 mmol/L Glucose 112 (H) 65 - 100 mg/dL BUN 4 (L) 6 - 20 MG/DL Creatinine 0.75 0.55 - 1.02 MG/DL  
 BUN/Creatinine ratio 5 (L) 12 - 20 GFR est AA >60 >60 ml/min/1.73m2 GFR est non-AA >60 >60 ml/min/1.73m2 Calcium 9.0 8.5 - 10.1 MG/DL Bilirubin, total 1.7 (H) 0.2 - 1.0 MG/DL  
 ALT (SGPT) 52 12 - 78 U/L  
 AST (SGOT) 139 (H) 15 - 37 U/L Alk. phosphatase 118 (H) 45 - 117 U/L Protein, total 9.2 (H) 6.4 - 8.2 g/dL Albumin 3.8 3.5 - 5.0 g/dL Globulin 5.4 (H) 2.0 - 4.0 g/dL A-G Ratio 0.7 (L) 1.1 - 2.2 LIPASE Collection Time: 02/09/19  7:30 PM  
Result Value Ref Range Lipase 507 (H) 73 - 393 U/L  
URINALYSIS W/ REFLEX CULTURE Collection Time: 02/09/19  7:30 PM  
Result Value Ref Range Color YELLOW/STRAW Appearance CLEAR CLEAR Specific gravity 1.010 1.003 - 1.030    
 pH (UA) 7.0 5.0 - 8.0 Protein 30 (A) NEG mg/dL Glucose NEGATIVE  NEG mg/dL Ketone TRACE (A) NEG mg/dL Bilirubin NEGATIVE  NEG Blood NEGATIVE  NEG Urobilinogen 2.0 (H) 0.2 - 1.0 EU/dL Nitrites NEGATIVE  NEG Leukocyte Esterase NEGATIVE  NEG    
 WBC 0-4 0 - 4 /hpf  
 RBC 0-5 0 - 5 /hpf Epithelial cells FEW FEW /lpf Bacteria NEGATIVE  NEG /hpf  
 UA:UC IF INDICATED CULTURE NOT INDICATED BY UA RESULT CNI    
ETHYL ALCOHOL Collection Time: 02/09/19  9:59 PM  
Result Value Ref Range ALCOHOL(ETHYL),SERUM 25 (H) <10 MG/DL  
DRUG SCREEN, URINE Collection Time: 02/09/19  9:59 PM  
Result Value Ref Range AMPHETAMINES NEGATIVE  NEG    
 BARBITURATES NEGATIVE  NEG BENZODIAZEPINES NEGATIVE  NEG    
 COCAINE NEGATIVE  NEG METHADONE NEGATIVE  NEG    
 OPIATES NEGATIVE  NEG    
 PCP(PHENCYCLIDINE) NEGATIVE  NEG    
 THC (TH-CANNABINOL) NEGATIVE  NEG Drug screen comment (NOTE) Radiologic Studies - No orders to display CT Results  (Last 48 hours) None CXR Results  (Last 48 hours) None Medical Decision Making I am the first provider for this patient. I reviewed the vital signs, available nursing notes, past medical history, past surgical history, family history and social history. Vital Signs-Reviewed the patient's vital signs. Records Reviewed: Nursing Notes, Old Medical Records and Previous Laboratory Studies Disposition: 
 
Patient is being admitted to the hospital.  The results of their tests and reasons for their admission have been discussed with them and/or available family. They convey agreement and understanding for the need to be admitted and for their admission diagnosis. Consultation has been made with the inpatient physician specialist for hospitalization. LABORATORY TESTS: 
Recent Results (from the past 12 hour(s)) CBC WITH AUTOMATED DIFF Collection Time: 02/09/19  7:30 PM  
Result Value Ref Range WBC 5.9 3.6 - 11.0 K/uL  
 RBC 4.89 3.80 - 5.20 M/uL  
 HGB 11.6 11.5 - 16.0 g/dL HCT 37.2 35.0 - 47.0 % MCV 76.1 (L) 80.0 - 99.0 FL  
 MCH 23.7 (L) 26.0 - 34.0 PG  
 MCHC 31.2 30.0 - 36.5 g/dL  
 RDW 17.4 (H) 11.5 - 14.5 % PLATELET 838 (L) 928 - 400 K/uL NRBC 0.0 0  WBC ABSOLUTE NRBC 0.00 0.00 - 0.01 K/uL NEUTROPHILS 46 32 - 75 % LYMPHOCYTES 43 12 - 49 % MONOCYTES 8 5 - 13 % EOSINOPHILS 0 0 - 7 % BASOPHILS 1 0 - 1 % IMMATURE GRANULOCYTES 2 (H) 0.0 - 0.5 % ABS. NEUTROPHILS 2.7 1.8 - 8.0 K/UL  
 ABS. LYMPHOCYTES 2.6 0.8 - 3.5 K/UL  
 ABS. MONOCYTES 0.5 0.0 - 1.0 K/UL  
 ABS. EOSINOPHILS 0.0 0.0 - 0.4 K/UL  
 ABS. BASOPHILS 0.0 0.0 - 0.1 K/UL  
 ABS. IMM. GRANS. 0.1 (H) 0.00 - 0.04 K/UL  
 DF AUTOMATED METABOLIC PANEL, COMPREHENSIVE Collection Time: 02/09/19  7:30 PM  
Result Value Ref Range Sodium 134 (L) 136 - 145 mmol/L Potassium 4.7 3.5 - 5.1 mmol/L  Chloride 98 97 - 108 mmol/L  
 CO2 24 21 - 32 mmol/L  
 Anion gap 12 5 - 15 mmol/L Glucose 112 (H) 65 - 100 mg/dL BUN 4 (L) 6 - 20 MG/DL Creatinine 0.75 0.55 - 1.02 MG/DL  
 BUN/Creatinine ratio 5 (L) 12 - 20 GFR est AA >60 >60 ml/min/1.73m2 GFR est non-AA >60 >60 ml/min/1.73m2 Calcium 9.0 8.5 - 10.1 MG/DL Bilirubin, total 1.7 (H) 0.2 - 1.0 MG/DL  
 ALT (SGPT) 52 12 - 78 U/L  
 AST (SGOT) 139 (H) 15 - 37 U/L Alk. phosphatase 118 (H) 45 - 117 U/L Protein, total 9.2 (H) 6.4 - 8.2 g/dL Albumin 3.8 3.5 - 5.0 g/dL Globulin 5.4 (H) 2.0 - 4.0 g/dL A-G Ratio 0.7 (L) 1.1 - 2.2 LIPASE Collection Time: 02/09/19  7:30 PM  
Result Value Ref Range Lipase 507 (H) 73 - 393 U/L  
URINALYSIS W/ REFLEX CULTURE Collection Time: 02/09/19  7:30 PM  
Result Value Ref Range Color YELLOW/STRAW Appearance CLEAR CLEAR Specific gravity 1.010 1.003 - 1.030    
 pH (UA) 7.0 5.0 - 8.0 Protein 30 (A) NEG mg/dL Glucose NEGATIVE  NEG mg/dL Ketone TRACE (A) NEG mg/dL Bilirubin NEGATIVE  NEG Blood NEGATIVE  NEG Urobilinogen 2.0 (H) 0.2 - 1.0 EU/dL Nitrites NEGATIVE  NEG Leukocyte Esterase NEGATIVE  NEG    
 WBC 0-4 0 - 4 /hpf  
 RBC 0-5 0 - 5 /hpf Epithelial cells FEW FEW /lpf Bacteria NEGATIVE  NEG /hpf  
 UA:UC IF INDICATED CULTURE NOT INDICATED BY UA RESULT CNI    
ETHYL ALCOHOL Collection Time: 02/09/19  9:59 PM  
Result Value Ref Range ALCOHOL(ETHYL),SERUM 25 (H) <10 MG/DL  
DRUG SCREEN, URINE Collection Time: 02/09/19  9:59 PM  
Result Value Ref Range AMPHETAMINES NEGATIVE  NEG    
 BARBITURATES NEGATIVE  NEG BENZODIAZEPINES NEGATIVE  NEG    
 COCAINE NEGATIVE  NEG METHADONE NEGATIVE  NEG    
 OPIATES NEGATIVE  NEG    
 PCP(PHENCYCLIDINE) NEGATIVE  NEG    
 THC (TH-CANNABINOL) NEGATIVE  NEG Drug screen comment (NOTE) IMAGING RESULTS: 
No orders to display No results found. MEDICATIONS GIVEN: 
Medications sodium chloride 0.9 % bolus infusion 1,000 mL (1,000 mL IntraVENous New Bag 2/9/19 1942) sodium chloride (NS) flush 5-40 mL (not administered)  
sodium chloride (NS) flush 5-40 mL (not administered)  
ondansetron (ZOFRAN) injection 4 mg (4 mg IntraVENous Given 2/9/19 1942) famotidine (PF) (PEPCID) 20 mg in sodium chloride 0.9% 10 mL injection (20 mg IntraVENous Given 2/9/19 2040)  
acetaminophen (TYLENOL) tablet 650 mg (650 mg Oral Given 2/9/19 2052) LORazepam (ATIVAN) tablet 1 mg (1 mg Oral Given 2/9/19 2204) cloNIDine HCl (CATAPRES) tablet 0.1 mg (0.1 mg Oral Given 2/9/19 2204) IMPRESSION: 
1. Alcohol-induced acute pancreatitis, unspecified complication status 2. Hematemesis with nausea PLAN: 
1. Admit to Dr Smitha Saunders attending has discussed findings with hospitalist. She will accept patient. Provider Notes (Medical Decision Making): DDX pancreatitis alcohol abuse PUD gastritis HX pancreatitis will admit vomited approx 200ml dark pink emesis. one episode. Lipase 507 Will admit Procedures: 
Procedures Diagnosis Clinical Impression: 1. Alcohol-induced acute pancreatitis, unspecified complication status 2. Hematemesis with nausea

## 2019-02-10 NOTE — ED NOTES
Verbal report given to Kavita Segal RN on patient. Report consisted of patient's situation, background, assessment, and recommendations. Information from the following report was reviewed with the receiving nurse. Opportunities for questions and clarification was provided. Bedside rounds were performed. Patient updated on plan of care and questions/concerns addressed.

## 2019-02-10 NOTE — ED NOTES
Verbal report received from [facility] on patient. Report consisted of patient's situation, background, assessment, and recommendations. Information from the following report was reviewed with the receiving nurse. Opportunity for questions and clarification was provided. Bedside report was performed. Assumed care of patient. Patient placed in position of comfort. Side rails up X2 and call bell within reach. Patient updated on plan of care. Patient's concerns/questions addressed.

## 2019-02-10 NOTE — H&P
Hospitalist Admission NoteNAME: Michael Armenta :  1975 MRN:  [id number] Date/Time:  2019 11:57 PM 
 
Patient PCP: Ashia Belle, NP 
______________________________________________________________________ Given the patient's current clinical presentation, I have a high level of concern for decompensation if discharged from the emergency department. Complex decision making was performed, which includes reviewing the patient's available past medical records, laboratory results, and x-ray films. My assessment of this patient's clinical condition and my plan of care is as follows. Assessment / Plan: Alcohol gastritis/early pancreatitis POA 
h/o chronic alcohol abuse 
cough with minimal blood streak ,non noted in ER, hemodynamically stable 
-NPO 
-IVF 
-IV protonix 
-prn pain medications and antiemetics 
-cycle lipase 
-stool occult H/O iron deficiency anemia 
-H/H every 8 hour, currently she is hemoconcentrated baseline hb ~8-9 Alcoholic pancreatitis 
-monitor LFTs HTN, accelerated Admit non compliance 
-Restart kristi verapamil 
-Pharmacy consult for med reconciliation 
-PRN hydralazine High risk for alcohol withdrawal 
-IVF with banana bag 
-will place on Ativan CIWA protocol Type II diabetes mellitus Says it is diet controlled 
-POC with ISS Asthma  
-PRN nebs Obesity Code Status: Full Surrogate Decision Maker: Brother Edwin clarke DVT Prophylaxis: ERIC Baseline: Independent Subjective: CHIEF COMPLAINT: Abdominal pain, N/V 
 
HISTORY OF PRESENT ILLNESS:    
Michael Armenta is a 37 y.o. female with a PMH of HTN, DM, alcohol abuse, anemia, alcohol abuse, alcoholic pancreatitis presented to ER with above symptoms. Pt says she took two beers today and afterwards started having abdominal with nausea and few episodes of vomiting. Pain was epigastric radiating to both sides, 8/10 with no aggravating or relieving factors. There was concern initially she might had emesis with blood, but pt says she had cough with minimal streak of blood. Denies fever, sick contacts or travel. Denies diarrhea. She drinks daily and has two 24 ounce beers today. Says she lost around 70 pounds in last one year because of alcoholism. Work up in [facility] 19 significant for lipase 507. We were asked to admit for work up and evaluation of the above problems. Past Medical History:  
Diagnosis Date  Asthma  Diabetes ([address]  Hypertension  Psychiatric disorder   
 depression  Sleep apnea Past Surgical History:  
Procedure Laterality Date  HX TUBAL LIGATION Social History Tobacco Use  Smoking status: Never Smoker  Smokeless tobacco: Never Used Substance Use Topics  Alcohol use: Yes Comment: 2 or 3 40's a day 7/18/17 Family History Problem Relation Age of Onset  Alcohol abuse Mother  Alcohol abuse Father Allergies Allergen Reactions  Aspirin Rash Patient denies allergy, sometimes itches  Lisinopril Swelling Prior to Admission medications Medication Sig Start Date End Date Taking? Authorizing Provider  
cephALEXin (KEFLEX) 500 mg capsule Take 1 Cap by mouth three (3) times daily. 10/15/18   Nai Boyle MD  
ketotifen (ZADITOR) 0.025 % (0.035 %) ophthalmic solution Administer 1 Drop to both eyes two (2) times daily as needed (ALLERGIES). Provider, Historical  
cetirizine (ZYRTEC) 10 mg tablet Take 10 mg by mouth daily as needed for Allergies. Provider, Historical  
OLANZapine (ZYPREXA) 10 mg tablet Take 10 mg by mouth nightly. Provider, Historical  
verapamil ER (VERELAN) 120 mg ER capsule Take 120 mg by mouth daily. Provider, Historical  
hydrOXYzine pamoate (VISTARIL) 25 mg capsule Take 25 mg by mouth three (3) times daily as needed for Itching.     Provider, Historical  
albuterol (PROVENTIL, VENTOLIN) 90 mcg/actuation inhaler Take 1-2 Puffs by inhalation every four (4) hours as needed for Wheezing or Shortness of Breath. 9/22/17   Gloria Blanc MD  
fluticasone-salmeterol (ADVAIR DISKUS) 250-50 mcg/dose diskus inhaler Take 1 Puff by inhalation two (2) times a day. Provider, Historical  
 
 
REVIEW OF SYSTEMS:    
I am not able to complete the review of systems because: The patient is intubated and sedated The patient has altered mental status due to his acute medical problems The patient has baseline aphasia from prior stroke(s) The patient has baseline dementia and is not reliable historian The patient is in acute medical distress and unable to provide information Total of 12 systems reviewed as follows:   
   POSITIVE= underlined text  Negative = text not underlined General:  fever, chills, sweats, generalized weakness, weight loss/gain,  
   loss of appetite Eyes:    blurred vision, eye pain, loss of vision, double vision ENT:    rhinorrhea, pharyngitis Respiratory:   cough, sputum production, SOB, ZHANG, wheezing, pleuritic pain  
Cardiology:   chest pain, palpitations, orthopnea, PND, edema, syncope Gastrointestinal:  abdominal pain , N/V, diarrhea, dysphagia, constipation, bleeding Genitourinary:  frequency, urgency, dysuria, hematuria, incontinence Muskuloskeletal :  arthralgia, myalgia, back pain Hematology:  easy bruising, nose or gum bleeding, lymphadenopathy Dermatological: rash, ulceration, pruritis, color change / jaundice Endocrine:   hot flashes or polydipsia Neurological:  headache, dizziness, confusion, focal weakness, paresthesia, Speech difficulties, memory loss, gait difficulty Psychological: Feelings of anxiety, depression, agitation Objective: VITALS:   
Visit Vitals BP (!) 199/140 Pulse 86 Temp 98.6 °F (37 °C) Resp 20 Ht 5' 3\" (1.6 m) Wt 108.9 kg (240 lb) SpO2 100% BMI 42.51 kg/m² PHYSICAL EXAM: with assistance of RN 
 
 General:    Alert, cooperative, no distress, appears stated age. HEENT: Atraumatic Lungs:   Clear to auscultation bilaterally. No Wheezing or Rhonchi. No rales. Chest wall:  No tenderness  No Accessory muscle use. Heart:   Regular  rhythm,  No  murmur Abdomen:   Epigastric tenderness  Bowel sounds normal 
Extremities: No cyanosis. No clubbing Skin:     Not Jaundiced  No rashes Psych:  Not anxious or agitated. Neurologic: No facial asymmetry. No aphasia or slurred speech. Symmetrical strength 
_______________________________________________________________________ Care Plan discussed with: 
  Comments Patient x Family RN x Care Manager Consultant:     
_______________________________________________________________________ Expected  Disposition:  
Home with Family x HH/PT/OT/RN   
SNF/LTC   
REGIS   
________________________________________________________________________ TOTAL TIME:  45 Minutes Critical Care Provided     Minutes non procedure based Comments Reviewed previous records  
>50% of visit spent in counseling and coordination of care  Discussion with patient and/or family and questions answered 
  
 
________________________________________________________________________ Signed: Katherine Paz MD 
 
Procedures: see electronic medical records for all procedures/Xrays and details which were not copied into this note but were reviewed prior to creation of Plan. LAB DATA REVIEWED:   
Recent Results (from the past 24 hour(s)) CBC WITH AUTOMATED DIFF Collection Time: 02/09/19  7:30 PM  
Result Value Ref Range WBC 5.9 3.6 - 11.0 K/uL  
 RBC 4.89 3.80 - 5.20 M/uL  
 HGB 11.6 11.5 - 16.0 g/dL HCT 37.2 35.0 - 47.0 % MCV 76.1 (L) 80.0 - 99.0 FL  
 MCH 23.7 (L) 26.0 - 34.0 PG  
 MCHC 31.2 30.0 - 36.5 g/dL  
 RDW 17.4 (H) 11.5 - 14.5 % PLATELET 935 (L) 366 - 400 K/uL NRBC 0.0 0  WBC ABSOLUTE NRBC 0.00 0.00 - 0.01 K/uL NEUTROPHILS 46 32 - 75 % LYMPHOCYTES 43 12 - 49 % MONOCYTES 8 5 - 13 % EOSINOPHILS 0 0 - 7 % BASOPHILS 1 0 - 1 % IMMATURE GRANULOCYTES 2 (H) 0.0 - 0.5 % ABS. NEUTROPHILS 2.7 1.8 - 8.0 K/UL  
 ABS. LYMPHOCYTES 2.6 0.8 - 3.5 K/UL  
 ABS. MONOCYTES 0.5 0.0 - 1.0 K/UL  
 ABS. EOSINOPHILS 0.0 0.0 - 0.4 K/UL  
 ABS. BASOPHILS 0.0 0.0 - 0.1 K/UL  
 ABS. IMM. GRANS. 0.1 (H) 0.00 - 0.04 K/UL  
 DF AUTOMATED METABOLIC PANEL, COMPREHENSIVE Collection Time: 02/09/19  7:30 PM  
Result Value Ref Range Sodium 134 (L) 136 - 145 mmol/L Potassium 4.7 3.5 - 5.1 mmol/L Chloride 98 97 - 108 mmol/L  
 CO2 24 21 - 32 mmol/L Anion gap 12 5 - 15 mmol/L Glucose 112 (H) 65 - 100 mg/dL BUN 4 (L) 6 - 20 MG/DL Creatinine 0.75 0.55 - 1.02 MG/DL  
 BUN/Creatinine ratio 5 (L) 12 - 20 GFR est AA >60 >60 ml/min/1.73m2 GFR est non-AA >60 >60 ml/min/1.73m2 Calcium 9.0 8.5 - 10.1 MG/DL Bilirubin, total 1.7 (H) 0.2 - 1.0 MG/DL  
 ALT (SGPT) 52 12 - 78 U/L  
 AST (SGOT) 139 (H) 15 - 37 U/L Alk. phosphatase 118 (H) 45 - 117 U/L Protein, total 9.2 (H) 6.4 - 8.2 g/dL Albumin 3.8 3.5 - 5.0 g/dL Globulin 5.4 (H) 2.0 - 4.0 g/dL A-G Ratio 0.7 (L) 1.1 - 2.2 LIPASE Collection Time: 02/09/19  7:30 PM  
Result Value Ref Range Lipase 507 (H) 73 - 393 U/L  
URINALYSIS W/ REFLEX CULTURE Collection Time: 02/09/19  7:30 PM  
Result Value Ref Range Color YELLOW/STRAW Appearance CLEAR CLEAR Specific gravity 1.010 1.003 - 1.030    
 pH (UA) 7.0 5.0 - 8.0 Protein 30 (A) NEG mg/dL Glucose NEGATIVE  NEG mg/dL Ketone TRACE (A) NEG mg/dL Bilirubin NEGATIVE  NEG Blood NEGATIVE  NEG Urobilinogen 2.0 (H) 0.2 - 1.0 EU/dL Nitrites NEGATIVE  NEG Leukocyte Esterase NEGATIVE  NEG    
 WBC 0-4 0 - 4 /hpf  
 RBC 0-5 0 - 5 /hpf Epithelial cells FEW FEW /lpf Bacteria NEGATIVE  NEG /hpf  
 UA:UC IF INDICATED CULTURE NOT INDICATED BY UA RESULT CNI ETHYL ALCOHOL Collection Time: 02/09/19  9:59 PM  
Result Value Ref Range ALCOHOL(ETHYL),SERUM 25 (H) <10 MG/DL  
DRUG SCREEN, URINE Collection Time: 02/09/19  9:59 PM  
Result Value Ref Range AMPHETAMINES NEGATIVE  NEG    
 BARBITURATES NEGATIVE  NEG BENZODIAZEPINES NEGATIVE  NEG    
 COCAINE NEGATIVE  NEG METHADONE NEGATIVE  NEG    
 OPIATES NEGATIVE  NEG    
 PCP(PHENCYCLIDINE) NEGATIVE  NEG    
 THC (TH-CANNABINOL) NEGATIVE  NEG Drug screen comment (NOTE)

## 2019-02-10 NOTE — ED NOTES
Hospitalist at [facility] refused to admit pt. Will contact  transfer center for admission to [id number] [place] [address].

## 2019-02-10 NOTE — PROGRESS NOTES
[address] 
[facility] Admission Pharmacy Medication Reconciliation Recommendations/Findings:  
1) Patient drowsy during interview; unable to complete 2) PTA list below includes meds filled since Dec 2018 per RX Query 3) Pt has prescription for Albuterol solution via nebulizer but she has not been able to obtain the nebulizer machine Pharmacy to follow up to confirm if pt still taking and last doses taken Information obtained from: Patient interview INCOMPLETE; RX Query Past Medical History/Disease States: 
Past Medical History:  
Diagnosis Date  Asthma  Diabetes ([address]  Hypertension  Psychiatric disorder   
 depression  Sleep apnea Patient allergies: Allergies as of 02/09/2019 - Review Complete 02/09/2019 Allergen Reaction Noted  Aspirin Rash 06/05/2015  Lisinopril Swelling 05/17/2018 Prior to Admission Medications Prescriptions VENTOLIN HFA 90 mcg/actuation inhaler Sig: Take 2 Inhalation by inhalation four (4) times daily as needed. VITAMIN B-1, MONONITRATE, 100 mg tablet Sig: Take 100 mg by mouth daily. VITAMIN D2 50,000 unit capsule Sig: Take 1 Cap by mouth every seven (7) days. albuterol (PROVENTIL, VENTOLIN) 90 mcg/actuation inhaler Sig: Take 1-2 Puffs by inhalation every four (4) hours as needed for Wheezing or Shortness of Breath. cetirizine (ZYRTEC) 10 mg tablet Sig: Take 10 mg by mouth daily as needed for Allergies. cholecalciferol (VITAMIN D3) 50,000 unit capsule Sig: Take 1 Cap by mouth every seven (7) days. cloNIDine HCl (CATAPRES) 0.1 mg tablet Sig: Take 0.1 mg by mouth two (2) times a day. ferrous sulfate (IRON) 325 mg (65 mg iron) EC tablet Sig: Take 1 Tab by mouth daily. fluticasone-salmeterol (ADVAIR DISKUS) 250-50 mcg/dose diskus inhaler Sig: Take 1 Puff by inhalation two (2) times a day.  
hydrOXYzine pamoate (VISTARIL) 25 mg capsule Sig: Take 25 mg by mouth three (3) times daily as needed for Itching.  
ketotifen (ZADITOR) 0.025 % (0.035 %) ophthalmic solution  PRN Sig: Administer 1 Drop to both eyes two (2) times daily as needed (ALLERGIES). losartan (COZAAR) 50 mg tablet Sig: Take 50 mg by mouth daily. meloxicam (MOBIC) 15 mg tablet Sig: Take 15 mg by mouth daily. pantoprazole (PROTONIX) 40 mg tablet Sig: Take 40 mg by mouth daily. verapamil ER (VERELAN) 120 mg ER capsule Sig: Take 120 mg by mouth daily. Thank you, Teresa Loera, [facility]

## 2019-02-10 NOTE — ED NOTES
Patient reports itching from morphine. No hives noted. Per patient, she has had itching previously when receiving morphine. Dr. Mina Bullard notified and verbal order for 25mg PO benadryl given.

## 2019-02-10 NOTE — ED NOTES
Bedside and Verbal shift change report given to Alonso Walker RN (oncoming nurse) by Emir De Jesus (offgoing nurse). Report included the following information SBAR, ED Summary and MAR.

## 2019-02-10 NOTE — ED NOTES
Report called to unit and accepted by Raphael Bojorquez RN. Plan of care accepted by pt. TRANSFER - OUT REPORT: 
 
Verbal report given to Mauro MCLAUGHLIN(name) on Aisha Perez  being transferred to [facility](unit) for routine progression of care Report consisted of patients Situation, Background, Assessment and  
Recommendations(SBAR). Information from the following report(s) SBAR, Kardex and ED Summary was reviewed with the receiving nurse. Lines:  
Peripheral IV 02/09/19 Right Wrist (Active) Site Assessment Clean, dry, & intact 2/9/2019  7:33 PM  
Phlebitis Assessment 0 2/9/2019  7:33 PM  
Infiltration Assessment 0 2/9/2019  7:33 PM  
Dressing Status Clean, dry, & intact 2/9/2019  7:33 PM  
Dressing Type Transparent 2/9/2019  7:33 PM  
Hub Color/Line Status Flushed 2/9/2019  7:33 PM  
  
 
Opportunity for questions and clarification was provided. Patient transported with: 
 Registered Nurse

## 2019-02-10 NOTE — ED NOTES
Per verbal order from Dr. Los Ross, patient is to be started on clear liquids. Patient given diet ginger ale.

## 2019-02-10 NOTE — ED NOTES
Patient continues to complain of generalized itching. No rash noted. Dr. Ninoska Lazaro notified and will call back.

## 2019-02-10 NOTE — H&P
Hospitalist Admission NoteNAME: Giovany Snider :  1975 MRN:  [id number] Room Number: [id number]  @ [facility]  
 
Date/Time:  2/10/2019 9:23 AM 
 
Patient PCP: Dilip Lemon, NP 
______________________________________________________________________ Given the patient's current clinical presentation, I have a high level of concern for decompensation if discharged from the emergency department. Complex decision making was performed, which includes reviewing the patient's available past medical records, laboratory results, and x-ray films. My assessment of this patient's clinical condition and my plan of care is as follows. Assessment / Plan: Alcohol gastritis/early pancreatitis POA 
h/o chronic alcohol abuse 
cough with minimal blood streak, per patient Advance diet, patient denies N/V since am 
Protonix-switch  to PO 
-prn pain medications and antiemetics 
-cycle lipase 
-stool occult-not yet sent HTN, accelerated Admit non compliance C/w home meds- verapamil,clonidine,losartan 
-Pharmacy consult for med reconciliation 
-PRN hydralazine H/O iron deficiency anemia Check iron panel, start iron tabs Alcoholic pancreatitis 
-monitor LFTs-trending down High risk for alcohol withdrawal 
MVI 
CIWA protocol,PRN Ativan Type II diabetes mellitus Not on oral hypoglycemics at home, reports being diet controlled  
-ISS Hypokalemia  
replace Mild intermittent Asthma  
-PRN nebs Morbid Obesity Body mass index is 42.51 kg/m². Counseled on Lifestyle modifications, AHA Diet ,weight loss strategies. Code Status: Full Surrogate Decision Maker: Brother Edwin clarke DVT Prophylaxis: ERIC Baseline: Independent Subjective: CHIEF COMPLAINT: N/V 
 
HISTORY OF PRESENT ILLNESS: PATIENT ADMITTED OVERNIGHT BY THE TELEHOSPITALIST Giovany Snider is a 37 y.o. female with a PMH of HTN, DM, alcohol abuse, anemia, alcohol abuse, alcoholic pancreatitis presented to ER with above symptoms. Pt says she took two beers today and afterwards started having abdominal with nausea and few episodes of vomiting. Pain was epigastric radiating to both sides, 8/10 with no aggravating or relieving factors. There was concern initially she might had emesis with blood, but pt says she had cough with minimal streak of blood. Denies fever, sick contacts or travel. Denies diarrhea. She drinks daily and has two 24 ounce beers today. Says she lost around 70 pounds in last one year because of alcoholism. Work up in [place] [address] significant for lipase 507. Patient reports she would like to eat something, has not had nausea/vomiting since am. 
Reports being admitted in ICU at [facility] for same problem in the past.  
We were asked to admit for work up and evaluation of the above problems. Past Medical History:  
Diagnosis Date  Asthma  Diabetes ([address]  Hypertension  Psychiatric disorder   
 depression  Sleep apnea Past Surgical History:  
Procedure Laterality Date  HX TUBAL LIGATION Social History Tobacco Use  Smoking status: Never Smoker  Smokeless tobacco: Never Used Substance Use Topics  Alcohol use: Yes Comment: 2 or 3 40's a day 7/18/17 Family History Problem Relation Age of Onset  Alcohol abuse Mother  Alcohol abuse Father Allergies Allergen Reactions  Morphine Itching Itching on torso, legs, arms  Aspirin Rash Patient denies allergy, sometimes itches  Lisinopril Swelling Prior to Admission medications Medication Sig Start Date End Date Taking? Authorizing Provider VENTOLIN HFA 90 mcg/actuation inhaler Take 2 Inhalation by inhalation four (4) times daily as needed. 12/27/18  Yes Provider, Historical  
cetirizine (ZYRTEC) 10 mg tablet Take 10 mg by mouth daily as needed for Allergies.    Yes Provider, Historical  
 verapamil ER (VERELAN) 120 mg ER capsule Take 120 mg by mouth daily. Yes Provider, Historical  
hydrOXYzine pamoate (VISTARIL) 25 mg capsule Take 25 mg by mouth three (3) times daily as needed for Itching. Yes Provider, Historical  
albuterol (PROVENTIL, VENTOLIN) 90 mcg/actuation inhaler Take 1-2 Puffs by inhalation every four (4) hours as needed for Wheezing or Shortness of Breath. 9/22/17  Yes An Jaffe MD  
fluticasone-salmeterol (ADVAIR DISKUS) 250-50 mcg/dose diskus inhaler Take 1 Puff by inhalation two (2) times a day. Yes Provider, Historical  
cholecalciferol (VITAMIN D3) 50,000 unit capsule Take 1 Cap by mouth every seven (7) days. 12/18/18   Provider, Historical  
VITAMIN D2 50,000 unit capsule Take 1 Cap by mouth every seven (7) days. 12/27/18   Provider, Historical  
ferrous sulfate (IRON) 325 mg (65 mg iron) EC tablet Take 1 Tab by mouth daily. 12/27/18   Provider, Historical  
cloNIDine HCl (CATAPRES) 0.1 mg tablet Take 0.1 mg by mouth two (2) times a day. 12/18/18   Provider, Historical  
losartan (COZAAR) 50 mg tablet Take 50 mg by mouth daily. 12/27/18   Provider, Historical  
meloxicam (MOBIC) 15 mg tablet Take 15 mg by mouth daily. 12/18/18   Provider, Historical  
pantoprazole (PROTONIX) 40 mg tablet Take 40 mg by mouth daily. 12/27/18   Provider, Historical  
VITAMIN B-1, MONONITRATE, 100 mg tablet Take 100 mg by mouth daily. 12/18/18   Provider, Historical  
ketotifen (ZADITOR) 0.025 % (0.035 %) ophthalmic solution Administer 1 Drop to both eyes two (2) times daily as needed (ALLERGIES). Provider, Historical  
 
 
REVIEW OF SYSTEMS:    
I am not able to complete the review of systems because: The patient is intubated and sedated The patient has altered mental status due to his acute medical problems The patient has baseline aphasia from prior stroke(s) The patient has baseline dementia and is not reliable historian The patient is in acute medical distress and unable to provide information Total of 12 systems reviewed as follows:   
   POSITIVE= underlined text  Negative = text not underlined General:  fever, chills, sweats, generalized weakness, weight loss/gain,  
   loss of appetite Eyes:    blurred vision, eye pain, loss of vision, double vision ENT:    rhinorrhea, pharyngitis Respiratory:   cough, sputum production, SOB, ZHANG, wheezing, pleuritic pain  
Cardiology:   chest pain, palpitations, orthopnea, PND, edema, syncope Gastrointestinal:  abdominal pain , N/V, diarrhea, dysphagia, constipation, bleeding Genitourinary:  frequency, urgency, dysuria, hematuria, incontinence Muskuloskeletal :  arthralgia, myalgia, back pain Hematology:  easy bruising, nose or gum bleeding, lymphadenopathy Dermatological: rash, ulceration, pruritis, color change / jaundice Endocrine:   hot flashes or polydipsia Neurological:  headache, dizziness, confusion, focal weakness, paresthesia, Speech difficulties, memory loss, gait difficulty Psychological: Feelings of anxiety, depression, agitation Objective: VITALS:   
Visit Vitals BP (!) 162/92 (BP 1 Location: Right arm) Pulse 90 Temp 98.6 °F (37 °C) Resp 18 Ht 5' 3\" (1.6 m) Wt 108.9 kg (240 lb) SpO2 99% Breastfeeding? No  
BMI 42.51 kg/m² PHYSICAL EXAM: 
 
 
______________________________________________________________________ Care Plan discussed with:  Patient/Family and Nurse Expected  Disposition:  Home w/Family 
________________________________________________________________________ TOTAL TIME:  76 Minutes Critical Care Provided     Minutes non procedure based Comments Reviewed previous records  
>50% of visit spent in counseling and coordination of care  Discussion with patient and/or family and questions answered 
  
 
________________________________________________________________________ Signed: Vicki Byrne MD 
 
Procedures: see electronic medical records for all procedures/Xrays and details which were not copied into this note but were reviewed prior to creation of Plan. LAB DATA REVIEWED:   
Recent Results (from the past 24 hour(s)) CBC WITH AUTOMATED DIFF Collection Time: 02/09/19  7:30 PM  
Result Value Ref Range WBC 5.9 3.6 - 11.0 K/uL  
 RBC 4.89 3.80 - 5.20 M/uL  
 HGB 11.6 11.5 - 16.0 g/dL HCT 37.2 35.0 - 47.0 % MCV 76.1 (L) 80.0 - 99.0 FL  
 MCH 23.7 (L) 26.0 - 34.0 PG  
 MCHC 31.2 30.0 - 36.5 g/dL  
 RDW 17.4 (H) 11.5 - 14.5 % PLATELET 541 (L) 518 - 400 K/uL NRBC 0.0 0  WBC ABSOLUTE NRBC 0.00 0.00 - 0.01 K/uL NEUTROPHILS 46 32 - 75 % LYMPHOCYTES 43 12 - 49 % MONOCYTES 8 5 - 13 % EOSINOPHILS 0 0 - 7 % BASOPHILS 1 0 - 1 % IMMATURE GRANULOCYTES 2 (H) 0.0 - 0.5 % ABS. NEUTROPHILS 2.7 1.8 - 8.0 K/UL  
 ABS. LYMPHOCYTES 2.6 0.8 - 3.5 K/UL  
 ABS. MONOCYTES 0.5 0.0 - 1.0 K/UL  
 ABS. EOSINOPHILS 0.0 0.0 - 0.4 K/UL  
 ABS. BASOPHILS 0.0 0.0 - 0.1 K/UL ABS. IMM. GRANS. 0.1 (H) 0.00 - 0.04 K/UL  
 DF AUTOMATED METABOLIC PANEL, COMPREHENSIVE Collection Time: 02/09/19  7:30 PM  
Result Value Ref Range Sodium 134 (L) 136 - 145 mmol/L Potassium 4.7 3.5 - 5.1 mmol/L Chloride 98 97 - 108 mmol/L  
 CO2 24 21 - 32 mmol/L Anion gap 12 5 - 15 mmol/L Glucose 112 (H) 65 - 100 mg/dL BUN 4 (L) 6 - 20 MG/DL Creatinine 0.75 0.55 - 1.02 MG/DL  
 BUN/Creatinine ratio 5 (L) 12 - 20 GFR est AA >60 >60 ml/min/1.73m2 GFR est non-AA >60 >60 ml/min/1.73m2 Calcium 9.0 8.5 - 10.1 MG/DL Bilirubin, total 1.7 (H) 0.2 - 1.0 MG/DL  
 ALT (SGPT) 52 12 - 78 U/L  
 AST (SGOT) 139 (H) 15 - 37 U/L Alk. phosphatase 118 (H) 45 - 117 U/L Protein, total 9.2 (H) 6.4 - 8.2 g/dL Albumin 3.8 3.5 - 5.0 g/dL Globulin 5.4 (H) 2.0 - 4.0 g/dL A-G Ratio 0.7 (L) 1.1 - 2.2 LIPASE Collection Time: 02/09/19  7:30 PM  
Result Value Ref Range Lipase 507 (H) 73 - 393 U/L  
URINALYSIS W/ REFLEX CULTURE Collection Time: 02/09/19  7:30 PM  
Result Value Ref Range Color YELLOW/STRAW Appearance CLEAR CLEAR Specific gravity 1.010 1.003 - 1.030    
 pH (UA) 7.0 5.0 - 8.0 Protein 30 (A) NEG mg/dL Glucose NEGATIVE  NEG mg/dL Ketone TRACE (A) NEG mg/dL Bilirubin NEGATIVE  NEG Blood NEGATIVE  NEG Urobilinogen 2.0 (H) 0.2 - 1.0 EU/dL Nitrites NEGATIVE  NEG Leukocyte Esterase NEGATIVE  NEG    
 WBC 0-4 0 - 4 /hpf  
 RBC 0-5 0 - 5 /hpf Epithelial cells FEW FEW /lpf Bacteria NEGATIVE  NEG /hpf  
 UA:UC IF INDICATED CULTURE NOT INDICATED BY UA RESULT CNI    
ETHYL ALCOHOL Collection Time: 02/09/19  9:59 PM  
Result Value Ref Range ALCOHOL(ETHYL),SERUM 25 (H) <10 MG/DL  
DRUG SCREEN, URINE Collection Time: 02/09/19  9:59 PM  
Result Value Ref Range AMPHETAMINES NEGATIVE  NEG    
 BARBITURATES NEGATIVE  NEG BENZODIAZEPINES NEGATIVE  NEG    
 COCAINE NEGATIVE  NEG  METHADONE NEGATIVE  NEG    
 OPIATES NEGATIVE  NEG    
 PCP(PHENCYCLIDINE) NEGATIVE  NEG    
 THC (TH-CANNABINOL) NEGATIVE  NEG Drug screen comment (NOTE) GLUCOSE, POC Collection Time: 02/10/19 12:59 AM  
Result Value Ref Range Glucose (POC) 107 (H) 65 - 100 mg/dL Performed by Nga Whiting (PCT) CBC WITH AUTOMATED DIFF Collection Time: 02/10/19  4:20 AM  
Result Value Ref Range WBC 4.7 3.6 - 11.0 K/uL  
 RBC 4.55 3.80 - 5.20 M/uL  
 HGB 10.6 (L) 11.5 - 16.0 g/dL HCT 34.8 (L) 35.0 - 47.0 % MCV 76.5 (L) 80.0 - 99.0 FL  
 MCH 23.3 (L) 26.0 - 34.0 PG  
 MCHC 30.5 30.0 - 36.5 g/dL  
 RDW 17.2 (H) 11.5 - 14.5 % PLATELET 103 (L) 222 - 400 K/uL MPV 9.9 8.9 - 12.9 FL  
 NRBC 0.4 (H) 0  WBC ABSOLUTE NRBC 0.02 (H) 0.00 - 0.01 K/uL NEUTROPHILS 42 32 - 75 % LYMPHOCYTES 48 12 - 49 % MONOCYTES 9 5 - 13 % EOSINOPHILS 0 0 - 7 % BASOPHILS 0 0 - 1 % IMMATURE GRANULOCYTES 2 (H) 0.0 - 0.5 % ABS. NEUTROPHILS 2.0 1.8 - 8.0 K/UL  
 ABS. LYMPHOCYTES 2.2 0.8 - 3.5 K/UL  
 ABS. MONOCYTES 0.4 0.0 - 1.0 K/UL  
 ABS. EOSINOPHILS 0.0 0.0 - 0.4 K/UL  
 ABS. BASOPHILS 0.0 0.0 - 0.1 K/UL  
 ABS. IMM. GRANS. 0.1 (H) 0.00 - 0.04 K/UL  
 DF AUTOMATED METABOLIC PANEL, COMPREHENSIVE Collection Time: 02/10/19  4:20 AM  
Result Value Ref Range Sodium 139 136 - 145 mmol/L Potassium 3.4 (L) 3.5 - 5.1 mmol/L Chloride 104 97 - 108 mmol/L  
 CO2 24 21 - 32 mmol/L Anion gap 11 5 - 15 mmol/L Glucose 108 (H) 65 - 100 mg/dL BUN 6 6 - 20 MG/DL Creatinine 0.86 0.55 - 1.02 MG/DL  
 BUN/Creatinine ratio 7 (L) 12 - 20 GFR est AA >60 >60 ml/min/1.73m2 GFR est non-AA >60 >60 ml/min/1.73m2 Calcium 8.5 8.5 - 10.1 MG/DL Bilirubin, total 1.9 (H) 0.2 - 1.0 MG/DL  
 ALT (SGPT) 39 12 - 78 U/L  
 AST (SGOT) 94 (H) 15 - 37 U/L Alk. phosphatase 104 45 - 117 U/L Protein, total 8.1 6.4 - 8.2 g/dL Albumin 3.3 (L) 3.5 - 5.0 g/dL Globulin 4.8 (H) 2.0 - 4.0 g/dL A-G Ratio 0.7 (L) 1.1 - 2.2 LIPASE Collection Time: 02/10/19  4:20 AM  
Result Value Ref Range Lipase 488 (H) 73 - 393 U/L  
GLUCOSE, POC Collection Time: 02/10/19  7:15 AM  
Result Value Ref Range Glucose (POC) 112 (H) 65 - 100 mg/dL Performed by Alvino Fajardo ([facility]) GLUCOSE, POC Collection Time: 02/10/19 12:13 PM  
Result Value Ref Range Glucose (POC) 126 (H) 65 - 100 mg/dL Performed by Pauly Phipps"[facility]

## 2019-02-10 NOTE — PROGRESS NOTES
TRANSFER - IN REPORT: 
 
Verbal report received from Michael rn(name) on Aisha Perez  being received from ED(unit) for routine progression of care Report consisted of patients Situation, Background, Assessment and  
Recommendations(SBAR). Information from the following report(s) SBAR, Kardex, ED Summary, Intake/Output, MAR, Recent Results and Med Rec Status was reviewed with the receiving nurse. Opportunity for questions and clarification was provided. Assessment completed upon patients arrival to unit and care assumed.

## 2019-02-10 NOTE — ED NOTES
Patient continues to rest on stretcher with eyes closed. Patient denies any needs currently. Patient's visitor at bedside. Will continue to monitor.

## 2019-02-10 NOTE — ED NOTES
Patient continues to complain of itching. Dr. Jaci Salguero notified. Verbal orders for 10mg PO Zyrtec ordered. Primary nurse notified.

## 2019-02-11 ENCOUNTER — APPOINTMENT (OUTPATIENT)
Dept: CT IMAGING | Age: 44
DRG: 241 | End: 2019-02-11
Attending: INTERNAL MEDICINE
Payer: MEDICAID

## 2019-02-11 LAB
ANION GAP SERPL CALC-SCNC: 9 MMOL/L (ref 5–15)
BUN SERPL-MCNC: 10 MG/DL (ref 6–20)
BUN/CREAT SERPL: 10 (ref 12–20)
CALCIUM SERPL-MCNC: 8.8 MG/DL (ref 8.5–10.1)
CHLORIDE SERPL-SCNC: 104 MMOL/L (ref 97–108)
CO2 SERPL-SCNC: 24 MMOL/L (ref 21–32)
CREAT SERPL-MCNC: 1.03 MG/DL (ref 0.55–1.02)
ERYTHROCYTE [DISTWIDTH] IN BLOOD BY AUTOMATED COUNT: 17.4 % (ref 11.5–14.5)
GLUCOSE BLD STRIP.AUTO-MCNC: 108 MG/DL (ref 65–100)
GLUCOSE BLD STRIP.AUTO-MCNC: 138 MG/DL (ref 65–100)
GLUCOSE BLD STRIP.AUTO-MCNC: 140 MG/DL (ref 65–100)
GLUCOSE BLD STRIP.AUTO-MCNC: 93 MG/DL (ref 65–100)
GLUCOSE SERPL-MCNC: 111 MG/DL (ref 65–100)
HCT VFR BLD AUTO: 33.6 % (ref 35–47)
HGB BLD-MCNC: 10.1 G/DL (ref 11.5–16)
LIPASE SERPL-CCNC: 1726 U/L (ref 73–393)
MCH RBC QN AUTO: 23.5 PG (ref 26–34)
MCHC RBC AUTO-ENTMCNC: 30.1 G/DL (ref 30–36.5)
MCV RBC AUTO: 78.1 FL (ref 80–99)
NRBC # BLD: 0 K/UL (ref 0–0.01)
NRBC BLD-RTO: 0 PER 100 WBC
PLATELET # BLD AUTO: 94 K/UL (ref 150–400)
POTASSIUM SERPL-SCNC: 3.5 MMOL/L (ref 3.5–5.1)
RBC # BLD AUTO: 4.3 M/UL (ref 3.8–5.2)
SERVICE CMNT-IMP: ABNORMAL
SERVICE CMNT-IMP: NORMAL
SODIUM SERPL-SCNC: 137 MMOL/L (ref 136–145)
WBC # BLD AUTO: 3.8 K/UL (ref 3.6–11)

## 2019-02-11 PROCEDURE — 74011636637 HC RX REV CODE- 636/637: Performed by: HOSPITALIST

## 2019-02-11 PROCEDURE — 94640 AIRWAY INHALATION TREATMENT: CPT

## 2019-02-11 PROCEDURE — 74011250636 HC RX REV CODE- 250/636: Performed by: INTERNAL MEDICINE

## 2019-02-11 PROCEDURE — 74011250637 HC RX REV CODE- 250/637: Performed by: HOSPITALIST

## 2019-02-11 PROCEDURE — 83690 ASSAY OF LIPASE: CPT

## 2019-02-11 PROCEDURE — 74011000250 HC RX REV CODE- 250: Performed by: HOSPITALIST

## 2019-02-11 PROCEDURE — 36415 COLL VENOUS BLD VENIPUNCTURE: CPT

## 2019-02-11 PROCEDURE — 74011250636 HC RX REV CODE- 250/636: Performed by: HOSPITALIST

## 2019-02-11 PROCEDURE — 87177 OVA AND PARASITES SMEARS: CPT

## 2019-02-11 PROCEDURE — 87077 CULTURE AEROBIC IDENTIFY: CPT

## 2019-02-11 PROCEDURE — 82962 GLUCOSE BLOOD TEST: CPT

## 2019-02-11 PROCEDURE — 74011250637 HC RX REV CODE- 250/637: Performed by: INTERNAL MEDICINE

## 2019-02-11 PROCEDURE — 65270000032 HC RM SEMIPRIVATE

## 2019-02-11 PROCEDURE — 87045 FECES CULTURE AEROBIC BACT: CPT

## 2019-02-11 PROCEDURE — 87449 NOS EACH ORGANISM AG IA: CPT

## 2019-02-11 PROCEDURE — 85027 COMPLETE CBC AUTOMATED: CPT

## 2019-02-11 PROCEDURE — 74177 CT ABD & PELVIS W/CONTRAST: CPT

## 2019-02-11 PROCEDURE — 74011000250 HC RX REV CODE- 250: Performed by: INTERNAL MEDICINE

## 2019-02-11 PROCEDURE — 89055 LEUKOCYTE ASSESSMENT FECAL: CPT

## 2019-02-11 PROCEDURE — 80048 BASIC METABOLIC PNL TOTAL CA: CPT

## 2019-02-11 PROCEDURE — 74011636320 HC RX REV CODE- 636/320: Performed by: INTERNAL MEDICINE

## 2019-02-11 RX ORDER — ALBUTEROL SULFATE 0.83 MG/ML
2.5 SOLUTION RESPIRATORY (INHALATION)
Status: DISCONTINUED | OUTPATIENT
Start: 2019-02-11 | End: 2019-02-11

## 2019-02-11 RX ORDER — ONDANSETRON 2 MG/ML
4 INJECTION INTRAMUSCULAR; INTRAVENOUS
Status: DISCONTINUED | OUTPATIENT
Start: 2019-02-11 | End: 2019-02-13 | Stop reason: HOSPADM

## 2019-02-11 RX ORDER — SODIUM CHLORIDE 9 MG/ML
125 INJECTION, SOLUTION INTRAVENOUS CONTINUOUS
Status: DISCONTINUED | OUTPATIENT
Start: 2019-02-11 | End: 2019-02-12 | Stop reason: ALTCHOICE

## 2019-02-11 RX ORDER — ALBUTEROL SULFATE 0.83 MG/ML
2.5 SOLUTION RESPIRATORY (INHALATION)
Status: DISCONTINUED | OUTPATIENT
Start: 2019-02-11 | End: 2019-02-13 | Stop reason: HOSPADM

## 2019-02-11 RX ORDER — DICYCLOMINE HYDROCHLORIDE 10 MG/1
10 CAPSULE ORAL
Status: DISCONTINUED | OUTPATIENT
Start: 2019-02-11 | End: 2019-02-13 | Stop reason: HOSPADM

## 2019-02-11 RX ORDER — MAG HYDROX/ALUMINUM HYD/SIMETH 200-200-20
30 SUSPENSION, ORAL (FINAL DOSE FORM) ORAL
Status: DISCONTINUED | OUTPATIENT
Start: 2019-02-11 | End: 2019-02-13 | Stop reason: HOSPADM

## 2019-02-11 RX ORDER — SODIUM CHLORIDE 0.9 % (FLUSH) 0.9 %
5-10 SYRINGE (ML) INJECTION
Status: COMPLETED | OUTPATIENT
Start: 2019-02-11 | End: 2019-02-11

## 2019-02-11 RX ORDER — LOSARTAN POTASSIUM 50 MG/1
50 TABLET ORAL DAILY
Status: DISCONTINUED | OUTPATIENT
Start: 2019-02-11 | End: 2019-02-13 | Stop reason: HOSPADM

## 2019-02-11 RX ORDER — LANOLIN ALCOHOL/MO/W.PET/CERES
325 CREAM (GRAM) TOPICAL DAILY
Status: DISCONTINUED | OUTPATIENT
Start: 2019-02-11 | End: 2019-02-13 | Stop reason: HOSPADM

## 2019-02-11 RX ORDER — CLONIDINE HYDROCHLORIDE 0.1 MG/1
0.1 TABLET ORAL 2 TIMES DAILY
Status: DISCONTINUED | OUTPATIENT
Start: 2019-02-11 | End: 2019-02-13 | Stop reason: HOSPADM

## 2019-02-11 RX ORDER — TRAMADOL HYDROCHLORIDE 50 MG/1
50 TABLET ORAL
Status: DISCONTINUED | OUTPATIENT
Start: 2019-02-11 | End: 2019-02-13 | Stop reason: HOSPADM

## 2019-02-11 RX ORDER — LABETALOL HCL 20 MG/4 ML
20 SYRINGE (ML) INTRAVENOUS
Status: DISCONTINUED | OUTPATIENT
Start: 2019-02-11 | End: 2019-02-13 | Stop reason: HOSPADM

## 2019-02-11 RX ADMIN — LOSARTAN POTASSIUM 50 MG: 50 TABLET ORAL at 08:01

## 2019-02-11 RX ADMIN — DIATRIZOATE MEGLUMINE AND DIATRIZOATE SODIUM 30 ML: 600; 100 SOLUTION ORAL; RECTAL at 11:26

## 2019-02-11 RX ADMIN — IOPAMIDOL 100 ML: 755 INJECTION, SOLUTION INTRAVENOUS at 12:30

## 2019-02-11 RX ADMIN — DICYCLOMINE HYDROCHLORIDE 10 MG: 10 CAPSULE ORAL at 18:56

## 2019-02-11 RX ADMIN — TRAMADOL HYDROCHLORIDE 50 MG: 50 TABLET, FILM COATED ORAL at 12:47

## 2019-02-11 RX ADMIN — LORAZEPAM 2 MG: 2 INJECTION INTRAMUSCULAR; INTRAVENOUS at 23:20

## 2019-02-11 RX ADMIN — PANTOPRAZOLE SODIUM 40 MG: 40 TABLET, DELAYED RELEASE ORAL at 08:01

## 2019-02-11 RX ADMIN — VERAPAMIL HYDROCHLORIDE 120 MG: 120 TABLET, FILM COATED, EXTENDED RELEASE ORAL at 08:01

## 2019-02-11 RX ADMIN — LABETALOL HYDROCHLORIDE 20 MG: 5 INJECTION, SOLUTION INTRAVENOUS at 16:11

## 2019-02-11 RX ADMIN — ALBUTEROL SULFATE 2.5 MG: 2.5 SOLUTION RESPIRATORY (INHALATION) at 04:24

## 2019-02-11 RX ADMIN — FERROUS SULFATE TAB 325 MG (65 MG ELEMENTAL FE) 325 MG: 325 (65 FE) TAB at 08:01

## 2019-02-11 RX ADMIN — Medication 10 ML: at 12:30

## 2019-02-11 RX ADMIN — Medication 10 ML: at 21:37

## 2019-02-11 RX ADMIN — SODIUM CHLORIDE 125 ML/HR: 900 INJECTION, SOLUTION INTRAVENOUS at 09:57

## 2019-02-11 RX ADMIN — Medication 10 ML: at 05:00

## 2019-02-11 RX ADMIN — INSULIN LISPRO 2 UNITS: 100 INJECTION, SOLUTION INTRAVENOUS; SUBCUTANEOUS at 09:00

## 2019-02-11 RX ADMIN — ALBUTEROL SULFATE 2.5 MG: 2.5 SOLUTION RESPIRATORY (INHALATION) at 19:53

## 2019-02-11 RX ADMIN — SODIUM CHLORIDE 125 ML/HR: 900 INJECTION, SOLUTION INTRAVENOUS at 21:38

## 2019-02-11 RX ADMIN — Medication 10 ML: at 16:10

## 2019-02-11 RX ADMIN — CLONIDINE HYDROCHLORIDE 0.1 MG: 0.1 TABLET ORAL at 17:58

## 2019-02-11 RX ADMIN — MELATONIN 6 MG: 3 TAB ORAL at 21:37

## 2019-02-11 RX ADMIN — CLONIDINE HYDROCHLORIDE 0.1 MG: 0.1 TABLET ORAL at 08:01

## 2019-02-11 RX ADMIN — TRAMADOL HYDROCHLORIDE 50 MG: 50 TABLET, FILM COATED ORAL at 18:56

## 2019-02-11 RX ADMIN — ALUMINUM HYDROXIDE, MAGNESIUM HYDROXIDE, AND SIMETHICONE 30 ML: 200; 200; 20 SUSPENSION ORAL at 21:37

## 2019-02-11 RX ADMIN — DICYCLOMINE HYDROCHLORIDE 10 MG: 10 CAPSULE ORAL at 15:30

## 2019-02-11 RX ADMIN — ACETAMINOPHEN 650 MG: 325 TABLET, FILM COATED ORAL at 03:42

## 2019-02-11 RX ADMIN — FOLIC ACID: 5 INJECTION, SOLUTION INTRAMUSCULAR; INTRAVENOUS; SUBCUTANEOUS at 23:22

## 2019-02-11 RX ADMIN — ACETAMINOPHEN 650 MG: 325 TABLET, FILM COATED ORAL at 09:57

## 2019-02-11 NOTE — PROGRESS NOTES
Hospitalist Progress Note NAME: Sofie Banks :  1975 MRN:  [id number] Room Number:  614/68  @ [facility] Summary: 37 y.o. female whom presented on 2019 with Assessment / Plan: 
 
Acute diarrhea Send  Stool studies for wbc,ova/parasite/CX, r/o C diff Contact precautions untill cdiff r/o Alcohol gastritis/Acute alcoholic pancreatitis POA 
h/o chronic alcohol abuse CT Abd-Acute uncomplicated pancreatitis. 2. Marked hepatic steatosis. 3. Colonic diverticulosis. Lipase trending up Clears,advance diet as tolerated 
-prn pain medications and antiemetics HTN, Essential 
Admit non compliance C/w home meds- verapamil,clonidine,losartan 
-PRN hydralazine H/O iron deficiency anemia Check iron panel, start iron tabs High risk for alcohol withdrawal 
MVI 
CIWA protocol,PRN Ativan Type II diabetes mellitus Not on oral hypoglycemics at home, reports being diet controlled  
-ISS Hypokalemia  
replace Mild intermittent Asthma  
-PRN nebs Morbid Obesity Body mass index is 42.51 kg/m². Counseled on Lifestyle modifications, AHA Diet ,weight loss strategies. Code status: Full Prophylaxis: Lovenox Recommended Disposition: Home w/Family Subjective: Chief Complaint / Reason for Physician Visit \" I have loose stools and belly cramps \". Discussed with RN events overnight. Review of Systems: 
Symptom Y/N Comments  Symptom Y/N Comments Fever/Chills n   Chest Pain y Poor Appetite n   Edema Cough n   Abdominal Pain y Sputum n   Joint Pain SOB/ZHANG n   Pruritis/Rash Nausea/vomit n   Tolerating PT/OT Diarrhea y   Tolerating Diet y Constipation    Other Could NOT obtain due to:   
 
Objective: VITALS:  
Last 24hrs VS reviewed since prior progress note. Most recent are: 
Patient Vitals for the past 24 hrs: 
 Temp Pulse Resp BP SpO2  
19 1209 97.6 °F (36.4 °C) 86 16 126/84 97 % 02/11/19 0902  86  145/84   
02/11/19 0745 98.7 °F (37.1 °C) 92 16 (!) 180/106 100 % 02/11/19 0339 97.2 °F (36.2 °C) 90 18 (!) 143/97 100 % 02/10/19 2137 98.1 °F (36.7 °C) 81 18 148/88 100 % 02/10/19 1604 98.1 °F (36.7 °C) 81 18 133/76 100 % 02/10/19 1430     99 % Intake/Output Summary (Last 24 hours) at 2/11/2019 1427 Last data filed at 2/11/2019 7059 Gross per 24 hour Intake 240 ml Output  Net 240 ml PHYSICAL EXAM: 
General: WD, WN. Alert, cooperative, no acute distress   
EENT:  EOMI. Anicteric sclerae. MMM Resp:  CTA bilaterally, no wheezing or rales. No accessory muscle use CV:  Regular  rhythm,  No edema GI:  Soft, Non distended, Non tender.  +Bowel sounds Neurologic:  Alert and oriented X 3, normal speech, Psych:   Good insight. Not anxious nor agitated Skin:  No rashes. No jaundice Reviewed most current lab test results and cultures  YES Reviewed most current radiology test results   YES Review and summation of old records today    NO Reviewed patient's current orders and MAR    YES 
PMH/ reviewed - no change compared to H&P 
________________________________________________________________________ Care Plan discussed with: 
  Comments Patient x Family  x   
RN x Care Manager x Consultant Multidiciplinary team rounds were held today with , nursing, pharmacist and clinical coordinator. Patient's plan of care was discussed; medications were reviewed and discharge planning was addressed. ________________________________________________________________________ Total NON critical care TIME:  40   Minutes Total CRITICAL CARE TIME Spent:   Minutes non procedure based Comments >50% of visit spent in counseling and coordination of care    
________________________________________________________________________ Ryne Ambrose MD  
 
 Procedures: see electronic medical records for all procedures/Xrays and details which were not copied into this note but were reviewed prior to creation of Plan. LABS: 
I reviewed today's most current labs and imaging studies. Pertinent labs include: 
Recent Labs  
  02/11/19 
0351 02/10/19 
0420 02/09/19 
1930 WBC 3.8 4.7 5.9 HGB 10.1* 10.6* 11.6 HCT 33.6* 34.8* 37.2 PLT 94* 100* 105* Recent Labs  
  02/11/19 
0351 02/10/19 
0420 02/09/19 
1930  139 134* K 3.5 3.4* 4.7  104 98 CO2 24 24 24 * 108* 112* BUN 10 6 4* CREA 1.03* 0.86 0.75 CA 8.8 8.5 9.0 ALB  --  3.3* 3.8 TBILI  --  1.9* 1.7* SGOT  --  94* 139* ALT  --  39 52 Signed: Antolin Burden MD

## 2019-02-11 NOTE — INTERDISCIPLINARY ROUNDS
Patient treatment plan discuss. Patient report several loose bowel stoop since admission. Discuss of possible C. Diff. C.Diff chart protocol read and discuss for patient treatment plan. Patient pain control in discussion along with medication for pain management. [address] [place] [address] 
[phone]

## 2019-02-11 NOTE — PROGRESS NOTES
Problem: Falls - Risk of 
Goal: *Absence of Falls Document Mckenzie Zhang Fall Risk and appropriate interventions in the flowsheet. Outcome: Progressing Towards Goal 
Fall Risk Interventions: 
  
 
  
 
Medication Interventions: Patient to call before getting OOB

## 2019-02-11 NOTE — PROGRESS NOTES
Problem: Falls - Risk of 
Goal: *Absence of Falls Document [place] Rank Fall Risk and appropriate interventions in the flowsheet. Outcome: Progressing Towards Goal 
Fall Risk Interventions: 
  
 
  
 
Medication Interventions: Patient to call before getting OOB, Teach patient to arise slowly

## 2019-02-11 NOTE — PROGRESS NOTES
Bedside shift change report given to [facility] BRAYDEN (oncoming nurse) by Elliott Daniel (offgoing nurse). Report included the following information SBAR, Kardex, Intake/Output, MAR and Recent Results.

## 2019-02-11 NOTE — PROGRESS NOTES
0731) Bedside shift change report given to Anton Melendrez RN (oncoming nurse) by Luis Miguel Patton RN (offgoing nurse). Report included the following information SBAR, Kardex, Intake/Output, MAR, Accordion and Recent Results 1853) pt stated 5 bowel movements this morning 
1000) Discuss 1/2 of gastraview at 11 and 1/2 1130 downstairs with [facility], [place].   
7969) pt return from [address]) Consult Dr. Seema Collins;  request to see doctor to discuss CT results. 200) Consult Dr. Seema Collins, start telemetry for labetalol administration /94 
1920) Bedside shift change report given to Luis Miguel Patton RN (oncoming nurse) by Anton Melendrez RN (offgoing nurse). Report included the following information SBAR, Kardex, MAR, Accordion, Recent Results and Cardiac Rhythm NSR.

## 2019-02-11 NOTE — ADT AUTH CERT NOTES
REQUEST FOR INPATIENT AUTHORIZATION Facility Name: [facility] NPI # [id number] MD Arminda Hutchison NPI # [id number]         
  
  
  
  
  
   
Patient Demographics Patient Name Jeison Enriquez Sex Female  
1975 Address [place] [address] Phone [phone] (Home) Hospital Account Name Acct ID Class Status Primary Coverage Jeison Enriquez [id number] INPATIENT Open [employer] [place]  
  
   
Guarantor Account (for Hospital Account [de-identified]) Name Relation to Pt Service Area Active? Acct Type Jeison Enriquez Self [address] Yes Personal/Family Address Phone Roddy Fleming, [address] [facility] [phone](J)    
  
   
Coverage Information (for Hospital Account [de-identified]) F/O Payor/Plan Subscriber  Subscriber Sex Precert #  
[employer] [place]ICAID/[place] [employer] 71 F Subscriber Subscriber # Jeison Enriquez [id number] Grp # Group Name  
 [facility] Address Phone [address] [facility] [address] Policy Number Status Effective Date Benefits Phone  
[id number] -  - Auth/Cert MD Arminda Hutchison NPI # [id number]  
  
   
Diagnosis Codes Comments Alcohol-induced acute pancreatitis, unspecified complication status  ETA-49-VT: K85.20 ICD-9-CM: 674.3   
  
   
Admission Information Arrival Date/Time: 2019 1746 Admit Date/Time: 2019 2332 IP Adm. Date/Time: 2019 3326 Admission Type: Emergency Point of Origin: Non-health Care Facility/self Admit Category:   
Means of Arrival: Ambulance Primary Service: Medicine Secondary Service: N/A Transfer Source:  Service Area: [address] [place] [address] Unit: [address] Admit Provider: Christian Solitario MD Attending Provider: Diana Joseph MD Referring Provider:   
Admission Information Attending Provider Admission Dx Admitted On Phillip Pfeiffer MD Alcoholic gastritis  Service Isolation Code Status MEDICINE Enteric Cont, Enteric Cont Full Code Allergies Advance Care Planning Morphine, Aspirin, Lisinopril Jump to the Activity    
Admission Information Unit/Bed: [place] [address] MED SURG & / Service: MEDICINE Admitting provider: Elfego Pavon MD Phone: [phone] Attending provider: Phillip Pfeiffer MD Phone: [phone] PCP: Loy Hernandes NP Phone: [phone] Admission dx:  Patient class: I Admission type: ER    
Patient Demographics Patient Name Komal Rojo 
[id number] Sex Female  
1975 Address [place] [address] Phone [phone] (Home) CSN:  
[id number] Admit Date: Admit Time Room Bed 2019 11:32  [48741] 01 [20752] Attending Providers Provider Pager From To Stephanie Sorensen MD  19 Kevin Canales MD  02/09/19 02/10/19 Phillip Pfeiffer MD  02/10/19 02/10/19 Elfego Pavon MD  02/10/19 02/10/19 Phillip Pfeiffer MD  02/10/19 Emergency Contact(s) Name Relation Home Work Mobile Fredrick Enriquez [phone] [address] [place] [address] Dr [place] Relative [phone] Utilization Reviews Pancreatitis - Care Day 1 (2019) by Tiera Macias RN Review Entered Review Status 2/10/2019 09:43 Completed Criteria Review Care Day: 1 Care Date: 2019 Level of Care:   
Guideline Day 1 Level Of Care (X) ICU[C]or floor 2/10/2019 09:43:34 EST by Tiera Macias  
  medical  
  
  
Clinical Status  
(X) * Clinical Indications met[D] (X) Pain, fever, or vomiting 2/10/2019 09:43:34 EST by Elpidio Mora with nausea and few episodes of vomiting. Pain was epigastric radiating to both sides, 8/10 with no aggravating or relieving factors 98.6 hr 86 rr 20, 191/112, r/a 99% 
repeat bp 199/140 Activity ( ) Bed rest  
2/10/2019 09:43:34 EST by Tiera Macias   wa monitoring wa high 17 
seizure precautions Routes  
(X) NPO, enteral, or oral feeds[E] 2/10/2019 09:43:34 EST by Codi Collier (X) IV fluids and medications 2/10/2019 09:43:34 EST by Rg Dhillon  
  iv ns wtih mvi qd, iv hydralazine 20 mg x1, 2400 hours, 2/10, 
sq ssi humalog 4x/day, iv ativan 4mg 0200 2/10 iv protonix 40 mg qd, po verapamil 120 mg qd, po catapres 0.1 mg x1, iv pepcid 20 mg x1, po ativan 1 mg x1,  
 
 
late night admit 2/9 2332 Interventions (X) Laboratory tests 2/10/2019 09:43:34 EST by Rg Dhillon  
  wbc 5.9, UA protein 30, ketone trace, urobili 2,  na 134, glcuose 112, total bili 1.7, lipase 507, alk phos 118, ast 139, etoh serum 25, Medications (X) Parenteral analgesia 2/10/2019 09:43:34 EST by Rg Dhillon  
  iv morphine 0200 /0700  2/10 x2 , 2mg doses 
 
late night admit 2/9 2332  
  
  
2/10/2019 09:43:34 EST by Rg Dhillon Subject: Additional Clinical Information  
review admission 2/9/19  
medical  
  
  
meds: Zofran iv 4mg x1, iv ns 1L (ER)  
  
  
  
  
2/10/2019 09:43:34 EST by Rg Dhillon Subject: Additional Clinical Information Alcohol gastritis/early pancreatitis POA  
  
h/o chronic alcohol abuse  
  
cough with minimal blood streak ,non noted in ER, hemodynamically stable  
  
-NPO  
  
-IVF  
  
-IV protonix  
  
-prn pain medications and antiemetics  
  
-cycle lipase  
  
-stool occult  
  
    
  
H/O iron deficiency anemia  
  
-H/H every 8 hour, currently she is hemoconcentrated baseline hb ~8-9  
  
    
  
Alcoholic pancreatitis  
  
-monitor LFTs  
  
    
  
HTN, accelerated Admit non compliance  
  
-Restart kristi verapamil  
  
-Pharmacy consult for med reconciliation  
  
-PRN hydralazine  
  
    
  
High risk for alcohol withdrawal  
  
-IVF with banana bag  
  
-will place on Ativan CIWA protocol  
  
    
  
Type II diabetes mellitus Says it is diet controlled -POC with ISS  
  
    
  
Asthma    
  
-PRN nebs * Milestone Pancreatitis - Clinical Indications for Admission to Inpatient Care by Cecilia Case RN Review Entered Review Status 2/10/2019 09:33 Completed Criteria Review Clinical Indications for Admission to Inpatient Care Most Recent : Cecilia Case Most Recent Date: 2/10/2019 09:33:15 EST  
(X) Admission is indicated for1 or more of the following(1)(2)(3)(4)(5)(6)(7)(8)(9):  
   ( ) Acute pancreatitis[A]as indicated by2 or more of the following:  
      (X) Abdominal pain (eg, epigastric, left upper quadrant)  
      2/10/2019 09:33:15 EST by Cecilia Case  
        Epigastric tenderness  Bowel sounds normal  
  
      ( ) Serum amylase or serum lipase greater than 3 times the upper limit of normal  
      2/10/2019 09:33:15 EST by Cecilia Case  
        lipase 507  
  
   (X) Pancreatitis (acute or chronic) requiring inpatient care as indicated by1 or more of the following:  
      (X) Inability to maintain oral hydration  
      2/10/2019 09:33:15 EST by Cecilia Case  
        n/v, pt is NPO, ivf  
  
  
  
  
  
  
Notes:   
  
2/10/2019 09:33:15 EST by Cecilia Case Subject: Additional Clinical Information 37 y.o. female [de-identified]  a PMH of  HTN, DM, alcohol abuse, anemia, alcohol abuse, alcoholic pancreatitis presented to ER with above symptoms. Pt says she took two beers today and afterwards started having abdominal with nausea and few episodes of vomiting. Pain was epigastric radiating to both sides, 8/10 with no aggravating or relieving factors. There was concern initially she might had emesis with blood, but pt says she had cough with minimal streak of blood. Denies fever, sick contacts or travel. Denies diarrhea. She drinks daily and has two 24 ounce beers today. Says she lost around 70 pounds in last one year because of alcoholism Gastrointestinal:            abdominal pain , N/V,

## 2019-02-12 LAB
ANION GAP SERPL CALC-SCNC: 11 MMOL/L (ref 5–15)
ATRIAL RATE: 76 BPM
BUN SERPL-MCNC: 8 MG/DL (ref 6–20)
BUN/CREAT SERPL: 10 (ref 12–20)
C DIFF GDH STL QL: NEGATIVE
C DIFF TOX A+B STL QL IA: NEGATIVE
CALCIUM SERPL-MCNC: 8.1 MG/DL (ref 8.5–10.1)
CALCULATED P AXIS, ECG09: 44 DEGREES
CALCULATED R AXIS, ECG10: 11 DEGREES
CALCULATED T AXIS, ECG11: 37 DEGREES
CHLORIDE SERPL-SCNC: 103 MMOL/L (ref 97–108)
CK MB CFR SERPL CALC: NORMAL % (ref 0–2.5)
CK MB SERPL-MCNC: <1 NG/ML (ref 5–25)
CK SERPL-CCNC: 47 U/L (ref 26–192)
CO2 SERPL-SCNC: 24 MMOL/L (ref 21–32)
CREAT SERPL-MCNC: 0.82 MG/DL (ref 0.55–1.02)
DIAGNOSIS, 93000: NORMAL
ERYTHROCYTE [DISTWIDTH] IN BLOOD BY AUTOMATED COUNT: 17.3 % (ref 11.5–14.5)
GLUCOSE BLD STRIP.AUTO-MCNC: 118 MG/DL (ref 65–100)
GLUCOSE BLD STRIP.AUTO-MCNC: 85 MG/DL (ref 65–100)
GLUCOSE BLD STRIP.AUTO-MCNC: 89 MG/DL (ref 65–100)
GLUCOSE BLD STRIP.AUTO-MCNC: 93 MG/DL (ref 65–100)
GLUCOSE SERPL-MCNC: 86 MG/DL (ref 65–100)
HCT VFR BLD AUTO: 32.4 % (ref 35–47)
HGB BLD-MCNC: 9.6 G/DL (ref 11.5–16)
INTERPRETATION: NORMAL
LIPASE SERPL-CCNC: >3000 U/L (ref 73–393)
MAGNESIUM SERPL-MCNC: 1.4 MG/DL (ref 1.6–2.4)
MAGNESIUM SERPL-MCNC: 1.9 MG/DL (ref 1.6–2.4)
MCH RBC QN AUTO: 23.4 PG (ref 26–34)
MCHC RBC AUTO-ENTMCNC: 29.6 G/DL (ref 30–36.5)
MCV RBC AUTO: 79 FL (ref 80–99)
NRBC # BLD: 0 K/UL (ref 0–0.01)
NRBC BLD-RTO: 0 PER 100 WBC
P-R INTERVAL, ECG05: 148 MS
PLATELET # BLD AUTO: 94 K/UL (ref 150–400)
POTASSIUM SERPL-SCNC: 3 MMOL/L (ref 3.5–5.1)
POTASSIUM SERPL-SCNC: 4 MMOL/L (ref 3.5–5.1)
Q-T INTERVAL, ECG07: 412 MS
QRS DURATION, ECG06: 92 MS
QTC CALCULATION (BEZET), ECG08: 463 MS
RBC # BLD AUTO: 4.1 M/UL (ref 3.8–5.2)
SERVICE CMNT-IMP: ABNORMAL
SERVICE CMNT-IMP: NORMAL
SODIUM SERPL-SCNC: 138 MMOL/L (ref 136–145)
TROPONIN I SERPL-MCNC: <0.05 NG/ML
VENTRICULAR RATE, ECG03: 76 BPM
WBC # BLD AUTO: 5.8 K/UL (ref 3.6–11)
WBC #/AREA STL HPF: NORMAL /HPF (ref 0–4)

## 2019-02-12 PROCEDURE — 94640 AIRWAY INHALATION TREATMENT: CPT

## 2019-02-12 PROCEDURE — 83735 ASSAY OF MAGNESIUM: CPT

## 2019-02-12 PROCEDURE — 82962 GLUCOSE BLOOD TEST: CPT

## 2019-02-12 PROCEDURE — 36415 COLL VENOUS BLD VENIPUNCTURE: CPT

## 2019-02-12 PROCEDURE — 93005 ELECTROCARDIOGRAM TRACING: CPT

## 2019-02-12 PROCEDURE — 84132 ASSAY OF SERUM POTASSIUM: CPT

## 2019-02-12 PROCEDURE — 85027 COMPLETE CBC AUTOMATED: CPT

## 2019-02-12 PROCEDURE — 74011250636 HC RX REV CODE- 250/636: Performed by: INTERNAL MEDICINE

## 2019-02-12 PROCEDURE — 74011250637 HC RX REV CODE- 250/637: Performed by: INTERNAL MEDICINE

## 2019-02-12 PROCEDURE — 74011250637 HC RX REV CODE- 250/637: Performed by: HOSPITALIST

## 2019-02-12 PROCEDURE — 83690 ASSAY OF LIPASE: CPT

## 2019-02-12 PROCEDURE — 74011000250 HC RX REV CODE- 250: Performed by: INTERNAL MEDICINE

## 2019-02-12 PROCEDURE — 65270000032 HC RM SEMIPRIVATE

## 2019-02-12 PROCEDURE — 82550 ASSAY OF CK (CPK): CPT

## 2019-02-12 PROCEDURE — 84484 ASSAY OF TROPONIN QUANT: CPT

## 2019-02-12 PROCEDURE — 80048 BASIC METABOLIC PNL TOTAL CA: CPT

## 2019-02-12 RX ORDER — LIDOCAINE HYDROCHLORIDE 20 MG/ML
15 SOLUTION OROPHARYNGEAL ONCE
Status: COMPLETED | OUTPATIENT
Start: 2019-02-12 | End: 2019-02-12

## 2019-02-12 RX ORDER — LANOLIN ALCOHOL/MO/W.PET/CERES
100 CREAM (GRAM) TOPICAL DAILY
Status: DISCONTINUED | OUTPATIENT
Start: 2019-02-12 | End: 2019-02-13 | Stop reason: HOSPADM

## 2019-02-12 RX ORDER — THERA TABS 400 MCG
1 TAB ORAL DAILY
Status: DISCONTINUED | OUTPATIENT
Start: 2019-02-12 | End: 2019-02-13 | Stop reason: HOSPADM

## 2019-02-12 RX ORDER — MAG HYDROX/ALUMINUM HYD/SIMETH 200-200-20
30 SUSPENSION, ORAL (FINAL DOSE FORM) ORAL ONCE
Status: COMPLETED | OUTPATIENT
Start: 2019-02-12 | End: 2019-02-12

## 2019-02-12 RX ORDER — FOLIC ACID 1 MG/1
1 TABLET ORAL DAILY
Status: DISCONTINUED | OUTPATIENT
Start: 2019-02-12 | End: 2019-02-13 | Stop reason: HOSPADM

## 2019-02-12 RX ORDER — MAGNESIUM SULFATE HEPTAHYDRATE 40 MG/ML
2 INJECTION, SOLUTION INTRAVENOUS ONCE
Status: COMPLETED | OUTPATIENT
Start: 2019-02-12 | End: 2019-02-12

## 2019-02-12 RX ORDER — POTASSIUM CHLORIDE AND SODIUM CHLORIDE 900; 300 MG/100ML; MG/100ML
INJECTION, SOLUTION INTRAVENOUS CONTINUOUS
Status: DISCONTINUED | OUTPATIENT
Start: 2019-02-12 | End: 2019-02-13 | Stop reason: HOSPADM

## 2019-02-12 RX ORDER — POTASSIUM CHLORIDE 1.5 G/1.77G
40 POWDER, FOR SOLUTION ORAL EVERY 4 HOURS
Status: COMPLETED | OUTPATIENT
Start: 2019-02-12 | End: 2019-02-12

## 2019-02-12 RX ADMIN — FOLIC ACID 1 MG: 1 TABLET ORAL at 12:17

## 2019-02-12 RX ADMIN — LIDOCAINE HYDROCHLORIDE 15 ML: 20 SOLUTION ORAL; TOPICAL at 02:51

## 2019-02-12 RX ADMIN — DICYCLOMINE HYDROCHLORIDE 10 MG: 10 CAPSULE ORAL at 15:53

## 2019-02-12 RX ADMIN — TRAMADOL HYDROCHLORIDE 50 MG: 50 TABLET, FILM COATED ORAL at 17:16

## 2019-02-12 RX ADMIN — POTASSIUM CHLORIDE 40 MEQ: 1.5 POWDER, FOR SOLUTION ORAL at 08:25

## 2019-02-12 RX ADMIN — ALBUTEROL SULFATE 2.5 MG: 2.5 SOLUTION RESPIRATORY (INHALATION) at 04:55

## 2019-02-12 RX ADMIN — Medication 10 ML: at 06:00

## 2019-02-12 RX ADMIN — TRAMADOL HYDROCHLORIDE 50 MG: 50 TABLET, FILM COATED ORAL at 23:32

## 2019-02-12 RX ADMIN — CLONIDINE HYDROCHLORIDE 0.1 MG: 0.1 TABLET ORAL at 08:25

## 2019-02-12 RX ADMIN — ALBUTEROL SULFATE 2.5 MG: 2.5 SOLUTION RESPIRATORY (INHALATION) at 10:55

## 2019-02-12 RX ADMIN — MELATONIN 6 MG: 3 TAB ORAL at 22:08

## 2019-02-12 RX ADMIN — ACETAMINOPHEN 650 MG: 325 TABLET, FILM COATED ORAL at 22:08

## 2019-02-12 RX ADMIN — TRAMADOL HYDROCHLORIDE 50 MG: 50 TABLET, FILM COATED ORAL at 01:51

## 2019-02-12 RX ADMIN — Medication 100 MG: at 12:16

## 2019-02-12 RX ADMIN — CLONIDINE HYDROCHLORIDE 0.1 MG: 0.1 TABLET ORAL at 17:16

## 2019-02-12 RX ADMIN — LOSARTAN POTASSIUM 50 MG: 50 TABLET ORAL at 08:25

## 2019-02-12 RX ADMIN — DICYCLOMINE HYDROCHLORIDE 10 MG: 10 CAPSULE ORAL at 22:08

## 2019-02-12 RX ADMIN — POTASSIUM CHLORIDE AND SODIUM CHLORIDE: 900; 300 INJECTION, SOLUTION INTRAVENOUS at 13:49

## 2019-02-12 RX ADMIN — ALBUTEROL SULFATE 2.5 MG: 2.5 SOLUTION RESPIRATORY (INHALATION) at 19:48

## 2019-02-12 RX ADMIN — Medication 10 ML: at 13:50

## 2019-02-12 RX ADMIN — POTASSIUM CHLORIDE 40 MEQ: 1.5 POWDER, FOR SOLUTION ORAL at 03:44

## 2019-02-12 RX ADMIN — ALUMINUM HYDROXIDE, MAGNESIUM HYDROXIDE, AND SIMETHICONE 30 ML: 200; 200; 20 SUSPENSION ORAL at 06:57

## 2019-02-12 RX ADMIN — ALUMINUM HYDROXIDE, MAGNESIUM HYDROXIDE, AND SIMETHICONE 30 ML: 200; 200; 20 SUSPENSION ORAL at 02:51

## 2019-02-12 RX ADMIN — VERAPAMIL HYDROCHLORIDE 120 MG: 120 TABLET, FILM COATED, EXTENDED RELEASE ORAL at 08:25

## 2019-02-12 RX ADMIN — TRAMADOL HYDROCHLORIDE 50 MG: 50 TABLET, FILM COATED ORAL at 10:07

## 2019-02-12 RX ADMIN — MAGNESIUM SULFATE IN WATER 2 G: 40 INJECTION, SOLUTION INTRAVENOUS at 03:44

## 2019-02-12 RX ADMIN — FERROUS SULFATE TAB 325 MG (65 MG ELEMENTAL FE) 325 MG: 325 (65 FE) TAB at 08:25

## 2019-02-12 RX ADMIN — POTASSIUM CHLORIDE AND SODIUM CHLORIDE: 900; 300 INJECTION, SOLUTION INTRAVENOUS at 22:06

## 2019-02-12 RX ADMIN — SODIUM CHLORIDE 125 ML/HR: 900 INJECTION, SOLUTION INTRAVENOUS at 08:48

## 2019-02-12 RX ADMIN — THERA TABS 1 TABLET: TAB at 12:16

## 2019-02-12 NOTE — PROGRESS NOTES
Notified Dr. Gena Guerrero of patient's potassium of 3.0, Mag 1.4 and cardiac enzymes within normal limits. New orders received.

## 2019-02-12 NOTE — PROGRESS NOTES
0130-Summoned to room by patient. Patient c/o \"7/10\" chest pain. Obtained 12 lead EKG, cardiac enzymes and vital signs. Adminserted PRN pain medication. Patient EKG revealed NSR, vital signs stable. Awaiting results of labs. 0214-Notified Dr. Kristin Dewey of above, new order received.

## 2019-02-12 NOTE — PROGRESS NOTES
Bedside shift change report given to ARNOLDO Johns (oncoming nurse) by Darling Delatorre (offgoing nurse). Report included the following information SBAR, Kardex, Intake/Output, MAR, Recent Results and Cardiac Rhythm NSR.

## 2019-02-12 NOTE — PROGRESS NOTES
Hospitalist Progress Note NAME: Michael Armenta :  1975 MRN:  [id number] Room Number:  516/30  @ [facility] Summary: 37 y.o. female whom presented on 2019 with Assessment / Plan: 
Update- 
Chest pain-epigastric, unlikely cardiac EKG-normal,trop-neg Responded to gi cocktail Alcohol gastritis/Acute alcoholic pancreatitis POA 
h/o chronic alcohol abuse CT Abd-Acute uncomplicated pancreatitis. 2. Marked hepatic steatosis. 3. Colonic diverticulosis. Lipase trending up Clears,advance diet as tolerated 
-prn pain medications and antiemetics Acute diarrhea- resolved Await results-Stool studies for wbc,ova/parasite/CX, r/o C diff Contact precautions untill cdiff r/o 
 
HTN, Essential 
Admit non compliance C/w home meds- verapamil,clonidine,losartan 
-PRN hydralazine H/O iron deficiency anemia Check iron panel, start iron tabs High risk for alcohol withdrawal 
MVI 
CIWA protocol,PRN Ativan Type II diabetes mellitus Not on oral hypoglycemics at home, reports being diet controlled  
-ISS Hypokalemia/hypomagnesemia  
replace Mild intermittent Asthma  
-PRN nebs Morbid Obesity Body mass index is 42.51 kg/m². Counseled on Lifestyle modifications, AHA Diet ,weight loss strategies. Code status: Full Prophylaxis: Lovenox Recommended Disposition: Home w/Family Subjective: Chief Complaint / Reason for Physician Visit \" I am doing better today \". Discussed with RN events overnight. Review of Systems: 
Symptom Y/N Comments  Symptom Y/N Comments Fever/Chills n   Chest Pain y Poor Appetite n   Edema Cough n   Abdominal Pain y Sputum n   Joint Pain SOB/ZHANG n   Pruritis/Rash Nausea/vomit n   Tolerating PT/OT Diarrhea n   Tolerating Diet y Constipation    Other Could NOT obtain due to:   
 
Objective: VITALS:  
Last 24hrs VS reviewed since prior progress note. Most recent are: Patient Vitals for the past 24 hrs: 
 Temp Pulse Resp BP SpO2  
02/12/19 1215 97.3 °F (36.3 °C) 80 18 121/75 97 % 02/12/19 1055     96 % 02/12/19 0824 98.1 °F (36.7 °C) 78 19 142/85 96 % 02/12/19 0357 98.1 °F (36.7 °C) 81 21 142/75 96 % 02/12/19 0132 98 °F (36.7 °C) 86 20 140/82 95 % 02/11/19 2001 98.1 °F (36.7 °C) 68 16 138/76 100 % 02/11/19 1758  72  139/75   
02/11/19 1650  72  137/74   
02/11/19 1611  76  163/86   
02/11/19 1530 95.8 °F (35.4 °C) 76 16 (!) 174/94 97 % Intake/Output Summary (Last 24 hours) at 2/12/2019 1259 Last data filed at 2/12/2019 2778 Gross per 24 hour Intake 120 ml Output  Net 120 ml PHYSICAL EXAM: 
General: WD, WN. Alert, cooperative, no acute distress   
EENT:  EOMI. Anicteric sclerae. MMM Resp:  CTA bilaterally, no wheezing or rales. No accessory muscle use CV:  Regular  rhythm,  No edema GI:  Soft, Non distended, Non tender.  +Bowel sounds Neurologic:  Alert and oriented X 3, normal speech, Psych:   Good insight. Not anxious nor agitated Skin:  No rashes. No jaundice Reviewed most current lab test results and cultures  YES Reviewed most current radiology test results   YES Review and summation of old records today    NO Reviewed patient's current orders and MAR    YES 
PMH/ reviewed - no change compared to H&P 
________________________________________________________________________ Care Plan discussed with: 
  Comments Patient x Family  x   
RN x Care Manager x Consultant Multidiciplinary team rounds were held today with , nursing, pharmacist and clinical coordinator. Patient's plan of care was discussed; medications were reviewed and discharge planning was addressed. ________________________________________________________________________ Total NON critical care TIME:  40   Minutes Total CRITICAL CARE TIME Spent:   Minutes non procedure based Comments >50% of visit spent in counseling and coordination of care    
________________________________________________________________________ Flavio Hahn MD  
 
Procedures: see electronic medical records for all procedures/Xrays and details which were not copied into this note but were reviewed prior to creation of Plan. LABS: 
I reviewed today's most current labs and imaging studies. Pertinent labs include: 
Recent Labs  
  02/12/19 
0146 02/11/19 
0351 02/10/19 
0420 WBC 5.8 3.8 4.7 HGB 9.6* 10.1* 10.6* HCT 32.4* 33.6* 34.8* PLT 94* 94* 100* Recent Labs  
  02/12/19 
1226 02/12/19 0146 02/11/19 
0351 02/10/19 
0420 02/09/19 
1930 NA  --  138 137 139 134* K 4.0 3.0* 3.5 3.4* 4.7  
CL  --  103 104 104 98 CO2  --  24 24 24 24 GLU  --  86 111* 108* 112* BUN  --  8 10 6 4* CREA  --  0.82 1.03* 0.86 0.75 CA  --  8.1* 8.8 8.5 9.0 MG 1.9 1.4*  --   --   --   
ALB  --   --   --  3.3* 3.8 TBILI  --   --   --  1.9* 1.7* SGOT  --   --   --  94* 139* ALT  --   --   --  39 52 Signed: Flavio Hahn MD

## 2019-02-12 NOTE — PROGRESS NOTES
Problem: Falls - Risk of 
Goal: *Absence of Falls Document Leona Child Fall Risk and appropriate interventions in the flowsheet. Outcome: Progressing Towards Goal 
Fall Risk Interventions: 
  
 
  
 
Medication Interventions: Evaluate medications/consider consulting pharmacy, Patient to call before getting OOB, Teach patient to arise slowly Elimination Interventions: Patient to call for help with toileting needs, Toilet paper/wipes in reach, Call light in reach

## 2019-02-12 NOTE — PROGRESS NOTES
Reason for Admission: RRAT Score:   14 Do you (patient/family) have any concerns for transition/discharge? Plan for utilizing home health:      
 
Likelihood of readmission: patient lives with son  [phone] patient new numb er permission to leave voicemail. Patient was at PCP office 2 month ago. Permission to schedule follow-up appointment. Patient has a Mental Health Skilled Builder through [employer]. Helping with medication transportation to some appointments. Patient is disabled getting disability. States she just started with the Mental Health builder does not know the contact information. Patient pharmacy is [facility] on [address]. Patient will need transportation assistance on discharge. Transition of Care Plan:   PCP appointment with Liset Holbrook. [address] [place] [address] 
[phone]

## 2019-02-12 NOTE — ROUTINE PROCESS
notified of CP, improving. Pt without cardiac hx, progress note notes EtOH gastritis 
- nursing has already obtained EKG and labs pending - GI cocktail x1 ordered Reviewed troponin, negative

## 2019-02-13 VITALS
TEMPERATURE: 97.7 F | BODY MASS INDEX: 42.52 KG/M2 | WEIGHT: 240 LBS | HEART RATE: 79 BPM | RESPIRATION RATE: 18 BRPM | OXYGEN SATURATION: 98 % | SYSTOLIC BLOOD PRESSURE: 155 MMHG | HEIGHT: 63 IN | DIASTOLIC BLOOD PRESSURE: 116 MMHG

## 2019-02-13 LAB
ANION GAP SERPL CALC-SCNC: 8 MMOL/L (ref 5–15)
BACTERIA SPEC CULT: NORMAL
BUN SERPL-MCNC: 4 MG/DL (ref 6–20)
BUN/CREAT SERPL: 5 (ref 12–20)
C JEJUNI+C COLI AG STL QL: NEGATIVE
CALCIUM SERPL-MCNC: 8.4 MG/DL (ref 8.5–10.1)
CHLORIDE SERPL-SCNC: 105 MMOL/L (ref 97–108)
CO2 SERPL-SCNC: 23 MMOL/L (ref 21–32)
CREAT SERPL-MCNC: 0.74 MG/DL (ref 0.55–1.02)
E COLI SXT1+2 STL IA: NEGATIVE
GLUCOSE BLD STRIP.AUTO-MCNC: 72 MG/DL (ref 65–100)
GLUCOSE BLD STRIP.AUTO-MCNC: 78 MG/DL (ref 65–100)
GLUCOSE BLD STRIP.AUTO-MCNC: 79 MG/DL (ref 65–100)
GLUCOSE BLD STRIP.AUTO-MCNC: 92 MG/DL (ref 65–100)
GLUCOSE SERPL-MCNC: 71 MG/DL (ref 65–100)
LIPASE SERPL-CCNC: 1311 U/L (ref 73–393)
MAGNESIUM SERPL-MCNC: 1.8 MG/DL (ref 1.6–2.4)
POTASSIUM SERPL-SCNC: 4.2 MMOL/L (ref 3.5–5.1)
SERVICE CMNT-IMP: NORMAL
SODIUM SERPL-SCNC: 136 MMOL/L (ref 136–145)

## 2019-02-13 PROCEDURE — 36415 COLL VENOUS BLD VENIPUNCTURE: CPT

## 2019-02-13 PROCEDURE — 94640 AIRWAY INHALATION TREATMENT: CPT

## 2019-02-13 PROCEDURE — 74011250637 HC RX REV CODE- 250/637: Performed by: INTERNAL MEDICINE

## 2019-02-13 PROCEDURE — 80048 BASIC METABOLIC PNL TOTAL CA: CPT

## 2019-02-13 PROCEDURE — 83690 ASSAY OF LIPASE: CPT

## 2019-02-13 PROCEDURE — 74011000250 HC RX REV CODE- 250: Performed by: INTERNAL MEDICINE

## 2019-02-13 PROCEDURE — 83735 ASSAY OF MAGNESIUM: CPT

## 2019-02-13 PROCEDURE — 74011250636 HC RX REV CODE- 250/636: Performed by: INTERNAL MEDICINE

## 2019-02-13 PROCEDURE — 82962 GLUCOSE BLOOD TEST: CPT

## 2019-02-13 RX ORDER — FOLIC ACID 1 MG/1
1 TABLET ORAL DAILY
Qty: 30 TAB | Refills: 0 | Status: SHIPPED | OUTPATIENT
Start: 2019-02-13

## 2019-02-13 RX ORDER — LANOLIN ALCOHOL/MO/W.PET/CERES
100 CREAM (GRAM) TOPICAL DAILY
Qty: 30 TAB | Refills: 0 | Status: SHIPPED | OUTPATIENT
Start: 2019-02-13

## 2019-02-13 RX ADMIN — TRAMADOL HYDROCHLORIDE 50 MG: 50 TABLET, FILM COATED ORAL at 05:17

## 2019-02-13 RX ADMIN — ALBUTEROL SULFATE 2.5 MG: 2.5 SOLUTION RESPIRATORY (INHALATION) at 07:47

## 2019-02-13 RX ADMIN — VERAPAMIL HYDROCHLORIDE 120 MG: 120 TABLET, FILM COATED, EXTENDED RELEASE ORAL at 11:10

## 2019-02-13 RX ADMIN — CLONIDINE HYDROCHLORIDE 0.1 MG: 0.1 TABLET ORAL at 11:09

## 2019-02-13 RX ADMIN — FERROUS SULFATE TAB 325 MG (65 MG ELEMENTAL FE) 325 MG: 325 (65 FE) TAB at 11:10

## 2019-02-13 RX ADMIN — LABETALOL HYDROCHLORIDE 20 MG: 5 INJECTION, SOLUTION INTRAVENOUS at 03:38

## 2019-02-13 RX ADMIN — Medication 100 MG: at 11:10

## 2019-02-13 RX ADMIN — ALBUTEROL SULFATE 2.5 MG: 2.5 SOLUTION RESPIRATORY (INHALATION) at 04:06

## 2019-02-13 RX ADMIN — FOLIC ACID 1 MG: 1 TABLET ORAL at 11:10

## 2019-02-13 RX ADMIN — LOSARTAN POTASSIUM 50 MG: 50 TABLET ORAL at 11:10

## 2019-02-13 RX ADMIN — THERA TABS 1 TABLET: TAB at 11:10

## 2019-02-13 RX ADMIN — POTASSIUM CHLORIDE AND SODIUM CHLORIDE: 900; 300 INJECTION, SOLUTION INTRAVENOUS at 06:19

## 2019-02-13 NOTE — DISCHARGE SUMMARY
DISCHARGE SUMMARY        PATIENT ID: Cinda Sorensen  MRN: [id number]   YOB: 1975   AGE: 37 y.o. DATE OF ADMISSION: 2/9/2019 11:32 PM    DATE OF DISCHARGE: 2/13/2019  PRIMARY CARE PROVIDER: Darryn Avila NP     Procedure Performed:  None       DISCHARGING PHYSICIAN: Andrea Powell MD    Call hospitalist office [phone]. Discharge Diagnosis:   Patient Active Problem List    Diagnosis Date Noted    Alcoholic gastritis 51/26/2917    Alcohol withdrawal ([address] 06/06/2018    HTN (hypertension) 06/06/2018    Type II diabetes mellitus ([address] 06/06/2018    Asthma 06/06/2018    Anemia 06/06/2018    Polysubstance dependence ([address] 07/19/2017    Substance induced mood disorder ([address] 07/19/2017    Cocaine intoxication ([address] 07/19/2017    Alcohol withdrawal syndrome with complication ([facility] 06/54/2461    Nausea & vomiting 07/18/2017    Cocaine abuse ([facility] 07/18/2017    Asthma with acute exacerbation 07/18/2017    Suicidal ideations 07/18/2017    SOBOE (shortness of breath on exertion) 10/20/2016              CONSULTATIONS: None    History of Present Ilness:    HISTORY OF PRESENT ILLNESS: PATIENT ADMITTED OVERNIGHT BY THE TELEHOSPITALIST   Rhina Lundberg is a 37 y. o. female with a PMH of HTN, DM, alcohol abuse, anemia, alcohol abuse, alcoholic pancreatitis presented to ER with above symptoms. Pt says she took two beers today and afterwards started having abdominal with nausea and few episodes of vomiting. Pain was epigastric radiating to both sides, 8/10 with no aggravating or relieving factors. There was concern initially she might had emesis with blood, but pt says she had cough with minimal streak of blood. Denies fever, sick contacts or travel. Denies diarrhea. She drinks daily and has two 24 ounce beers today. Says she lost around 70 pounds in last one year because of alcoholism.  Work up in [facility] 19 significant for lipase 507.    Patient reports she would like to eat something, has not had nausea/vomiting since am.  Reports being admitted in ICU at [facility] for same problem in the past.   We were asked to admit for work up and evaluation of the above problems        Hospital Course:   As below     Chest pain-epigastric, unlikely cardiac  EKG-normal,trop-neg  Responded to gi cocktail     Alcohol gastritis/Acute alcoholic pancreatitis POA  h/o chronic alcohol abuse  CT Abd-Acute uncomplicated pancreatitis. 2. Marked hepatic steatosis. 3. Colonic diverticulosis. Lipase trending up  Clears,advance diet as tolerated  -prn pain medications and antiemetics     Acute diarrhea- resolved  Await results-Stool studies for wbc,ova/parasite/CX, r/o C diff  Contact precautions untill cdiff r/o     HTN, Essential  Admit non compliance  C/w home meds- verapamil,clonidine,losartan  -PRN hydralazine     H/O iron deficiency anemia  Check iron panel, start iron tabs     High risk for alcohol withdrawal  MVI  CIWA protocol,PRN Ativan     Type II diabetes mellitus  Not on oral hypoglycemics at home, reports being diet controlled   -ISS     Hypokalemia/hypomagnesemia   replace     Mild intermittent Asthma   -PRN nebs    Hepatic steatosis   - alcohol related  - advised alc cessation      Morbid Obesity  Body mass index is 42.51 kg/m². Counseled on Lifestyle modifications, AHA Diet ,weight loss strategies.     Patient Lipase trending down . Patient able to tolerate liquid diet and diet advanced this AM, however patient reports a sudden family emergency and would like to be discharged home now. Patient advised to follow up with PCP on discharge. Patient also advised on alcohol cessation and follow up with AA  Patient acknowledges understanding. Patient stable at time of discharge.        Physical Exam on Discharge:  Visit Vitals  BP (!) 153/95 (BP 1 Location: Right arm, BP Patient Position: At rest)   Pulse 78   Temp (P) 97.3 °F (36.3 °C)   Resp 18   Ht 5' 3\" (1.6 m)   Wt 108.9 kg (240 lb)   SpO2 98%   Breastfeeding? No   BMI 42.51 kg/m²       General:  Alert, cooperative, no distress, appears stated age. Lungs:   Clear to auscultation bilaterally. Chest wall:  No tenderness or deformity. Heart:  Regular rate and rhythm, S1, S2 normal, no murmur, click, rub or gallop. Abdomen:   Soft, non-tender. Bowel sounds normal. No masses,  No organomegaly. Extremities: Extremities normal, atraumatic, no cyanosis or edema. Pulses: 2+ and symmetric all extremities. Skin: Skin color, texture, turgor normal. No rashes or lesions   Neurologic: CNII-XII intact. Pertinent Results:    Recent Labs     02/13/19  0350   BUN 4*      CO2 23     Recent Labs     02/12/19  0146   WBC 5.8   RBC 4.10   HCT 32.4*   MCV 79.0*   MCH 23.4*   MCHC 29.6*   RDW 17.3*     All Micro Results     Procedure Component Value Units Date/Time    CULTURE, STOOL [[id number]] Collected:  02/11/19 1159    Order Status:  Completed Specimen:  Stool Updated:  02/12/19 1739     Special Requests: NO SPECIAL REQUESTS        Campylobacter antigen NEGATIVE        Shiga toxin-producing E. coli Ag NEGATIVE        Culture result:       NO ROUTINE ENTERIC PATHOGENS ISOLATED INCLUDING SALMONELLA, SHIGELLA, YERSINIA, VIBRIO OR SHIGA TOXIN PRODUCING E. COLI SO FAR           C. DIFFICILE AG & TOXIN A/B [[id number]] Collected:  02/11/19 1159    Order Status:  Completed Specimen:  Stool Updated:  02/12/19 1119     GDH ANTIGEN NEGATIVE         C. difficile toxin NEGATIVE         INTERPRETATION       NEGATIVE FOR TOXIGENIC C. DIFFICILE          OVA & PARASITES, STOOL [[id number]] Collected:  02/11/19 1159    Order Status:  Completed Specimen:  Stool Updated:  02/11/19 1230          CT Results  (Last 48 hours)               02/11/19 1231  CT ABD PELV W CONT Final result    Impression:  IMPRESSION:    1. Acute uncomplicated pancreatitis. 2. Marked hepatic steatosis. 3. Colonic diverticulosis. 4. Uterine fibroid. .               Narrative: EXAMINATION:  CT ABD PELV W CONT   INDICATION:  pancreatitis   COMPARISON: None. CONTRAST: 100 mL of Isovue-370. TECHNIQUE:    Multislice helical CT was performed from the diaphragm to the symphysis pubis   during uneventful rapid bolus intravenous contrast administration. Oral contrast   was administered. Contiguous 5 mm axial images were reconstructed and lung and   soft tissue windows were generated. Coronal and sagittal reformations were   generated. CT dose reduction was achieved through use of a standardized protocol   tailored for this examination and automatic exposure control for dose   modulation. FINDINGS:   LOWER CHEST: The visualized portions of the lung bases are clear. ABDOMEN:   Liver: Marked hepatic steatosis. No focal lesions. Generalized hepatomegaly. Gallbladder and bile ducts: No gallstones. No biliary dilatation. Spleen: No abnormality. Pancreas: Mild generalized enlargement and poor definition of the pancreas with   slight haziness of the peripancreatic fat and stranding of the fat adjacent to   the pancreatic tail. Findings consistent with acute or corticated pancreatitis. No areas of nonenhancement of the parenchyma. No adjacent fluid collections. Adrenal glands: No abnormality. Kidneys: No abnormality. BOWEL AND MESENTERY: Stomach appears unremarkable. No small bowel   abnormalities. Diverticulosis of the left colon. No CT evidence of acute   diverticulitis. No mesenteric mass or adenopathy. Appendix normal.       PERITONEUM: No ascites or free intraperitoneal air. RETROPERITONEUM: Aorta  tapers without aneurysm. No retroperitoneal adenopathy   or mass. No pelvic mass or adenopathy. PELVIS:   Reproductive organs:  4.2 cm fibroid in the left uterine body. Bladder: Nondistended. BONES AND SOFT TISSUES: No significant bony abnormalities.                     CT abdomen /pelvis 2/11/19  Study Result     EXAMINATION:  CT ABD PELV W CONT  INDICATION:  pancreatitis  COMPARISON: None. CONTRAST: 100 mL of Isovue-370.     TECHNIQUE:   Multislice helical CT was performed from the diaphragm to the symphysis pubis  during uneventful rapid bolus intravenous contrast administration. Oral contrast  was administered. Contiguous 5 mm axial images were reconstructed and lung and  soft tissue windows were generated. Coronal and sagittal reformations were  generated. CT dose reduction was achieved through use of a standardized protocol  tailored for this examination and automatic exposure control for dose  modulation.     FINDINGS:  LOWER CHEST: The visualized portions of the lung bases are clear.     ABDOMEN:  Liver: Marked hepatic steatosis. No focal lesions. Generalized hepatomegaly. Gallbladder and bile ducts: No gallstones. No biliary dilatation. Spleen: No abnormality. Pancreas: Mild generalized enlargement and poor definition of the pancreas with  slight haziness of the peripancreatic fat and stranding of the fat adjacent to  the pancreatic tail. Findings consistent with acute or corticated pancreatitis. No areas of nonenhancement of the parenchyma. No adjacent fluid collections. Adrenal glands: No abnormality. Kidneys: No abnormality.     BOWEL AND MESENTERY: Stomach appears unremarkable. No small bowel  abnormalities. Diverticulosis of the left colon. No CT evidence of acute  diverticulitis. No mesenteric mass or adenopathy. Appendix normal.     PERITONEUM: No ascites or free intraperitoneal air.     RETROPERITONEUM: Aorta  tapers without aneurysm. No retroperitoneal adenopathy  or mass. No pelvic mass or adenopathy.     PELVIS:  Reproductive organs:  4.2 cm fibroid in the left uterine body. Bladder: Nondistended.      BONES AND SOFT TISSUES: No significant bony abnormalities.      IMPRESSION  IMPRESSION:   1. Acute uncomplicated pancreatitis. 2. Marked hepatic steatosis. 3. Colonic diverticulosis. 4. Uterine fibroid. .    Tests pending at discharge:     DISCHARGE MEDICATIONS:   Current Discharge Medication List      START taking these medications    Details   folic acid (FOLVITE) 1 mg tablet Take 1 Tab by mouth daily. Qty: 30 Tab, Refills: 0      thiamine HCL (B-1) 100 mg tablet Take 1 Tab by mouth daily. Qty: 30 Tab, Refills: 0         CONTINUE these medications which have NOT CHANGED    Details   VENTOLIN HFA 90 mcg/actuation inhaler Take 2 Inhalation by inhalation four (4) times daily as needed. Refills: 0      cetirizine (ZYRTEC) 10 mg tablet Take 10 mg by mouth daily as needed for Allergies. verapamil ER (VERELAN) 120 mg ER capsule Take 120 mg by mouth daily. hydrOXYzine pamoate (VISTARIL) 25 mg capsule Take 25 mg by mouth three (3) times daily as needed for Itching. albuterol (PROVENTIL, VENTOLIN) 90 mcg/actuation inhaler Take 1-2 Puffs by inhalation every four (4) hours as needed for Wheezing or Shortness of Breath. Qty: 17 g, Refills: 0      fluticasone-salmeterol (ADVAIR DISKUS) 250-50 mcg/dose diskus inhaler Take 1 Puff by inhalation two (2) times a day. cholecalciferol (VITAMIN D3) 50,000 unit capsule Take 1 Cap by mouth every seven (7) days. Refills: 0      VITAMIN D2 50,000 unit capsule Take 1 Cap by mouth every seven (7) days. Refills: 0      ferrous sulfate (IRON) 325 mg (65 mg iron) EC tablet Take 1 Tab by mouth daily. Refills: 0      cloNIDine HCl (CATAPRES) 0.1 mg tablet Take 0.1 mg by mouth two (2) times a day. Refills: 0      losartan (COZAAR) 50 mg tablet Take 50 mg by mouth daily. Refills: 0      meloxicam (MOBIC) 15 mg tablet Take 15 mg by mouth daily. Refills: 0      pantoprazole (PROTONIX) 40 mg tablet Take 40 mg by mouth daily. Refills: 0      VITAMIN B-1, MONONITRATE, 100 mg tablet Take 100 mg by mouth daily. Refills: 0      ketotifen (ZADITOR) 0.025 % (0.035 %) ophthalmic solution Administer 1 Drop to both eyes two (2) times daily as needed (ALLERGIES). Condition at discharge:  Afrebrile  Ambulating  Eating, Drinking, Voiding  Improved  Pain control acceptable  Passing flatus  Stable    FOLLOW UP APPOINTMENTS:    Follow-up Information     Follow up With Specialties Details Why Contact Info    Caryl Galvan NP Nurse Practitioner   [facility] [address] [place] [id number]  [phone]      [place] [id number] [phone]           Disposition:  Home      Total discharge preparation time was 30  minutes.

## 2019-02-13 NOTE — DISCHARGE INSTRUCTIONS
Patient Education        Gastritis: Care Instructions  Your Care Instructions    Gastritis is a sore and upset stomach. It happens when something irritates the stomach lining. Many things can cause it. These include an infection such as the flu or something you ate or drank. Medicines or a sore on the lining of the stomach (ulcer) also can cause it. Your belly may bloat and ache. You may belch, vomit, and feel sick to your stomach. You should be able to relieve the problem by taking medicine. And it may help to change your diet. If gastritis lasts, your doctor may prescribe medicine. Follow-up care is a key part of your treatment and safety. Be sure to make and go to all appointments, and call your doctor if you are having problems. It's also a good idea to know your test results and keep a list of the medicines you take. How can you care for yourself at home? · If your doctor prescribed antibiotics, take them as directed. Do not stop taking them just because you feel better. You need to take the full course of antibiotics. · Be safe with medicines. If your doctor prescribed medicine to decrease stomach acid, take it as directed. Call your doctor if you think you are having a problem with your medicine. · Do not take any other medicine, including over-the-counter pain relievers, without talking to your doctor first.  · If your doctor recommends over-the-counter medicine to reduce stomach acid, such as Pepcid AC, Prilosec, Tagamet HB, or Zantac 75, follow the directions on the label. · Drink plenty of fluids (enough so that your urine is light yellow or clear like water) to prevent dehydration. Choose water and other caffeine-free clear liquids. If you have kidney, heart, or liver disease and have to limit fluids, talk with your doctor before you increase the amount of fluids you drink. · Limit how much alcohol you drink. · Avoid coffee, tea, cola drinks, chocolate, and other foods with caffeine.  They increase stomach acid. When should you call for help? Call 911 anytime you think you may need emergency care. For example, call if:    · You vomit blood or what looks like coffee grounds.     · You pass maroon or very bloody stools.    Call your doctor now or seek immediate medical care if:    · You start breathing fast and have not produced urine in the last 8 hours.     · You cannot keep fluids down.    Watch closely for changes in your health, and be sure to contact your doctor if:    · You do not get better as expected. Where can you learn more? Go to http://estefania-gian.info/. Enter [id number] in the search box to learn more about \"Gastritis: Care Instructions. \"  Current as of: March 27, 2018  Content Version: 11.9  © 7988-3145 [employer]. Care instructions adapted under license by [employer] (which disclaims liability or warranty for this information). If you have questions about a medical condition or this instruction, always ask your healthcare professional. [address] any warranty or liability for your use of this information.

## 2019-02-13 NOTE — PROGRESS NOTES
0710hrs . Santi Nunn Bedside and Verbal shift change report given to Mark Whitaker (oncoming nurse) by Wilda Goodson (offgoing nurse). Report included the following information SBAR, Kardex, Intake/Output, MAR, Recent Results, Med Rec Status and Cardiac Rhythm NSR.  
  
1300hrs CDIFF was negative, isolation discontinued. 1910hrs . Santi Nunn Bedside and Verbal shift change report given to Erin (oncoming nurse) by [facility] nurse).  Report included the following information SBAR, Kardex, Intake/Output, MAR, Recent Results, Med Rec Status and Cardiac Rhythm NSR.

## 2019-02-13 NOTE — PROGRESS NOTES
1917: Bedside shift change report given to Eliu Higgins (oncoming nurse) by Zander Mejias (offgoing nurse). Report included the following information SBAR, Kardex, ED Summary, Intake/Output, MAR, Recent Results and Cardiac Rhythm SR.  
4892: Bedside shift change report given to Gen Iverson (oncoming nurse) by Eliu Higgins (offgoing nurse).  Report included the following information SBAR, Kardex, ED Summary, Intake/Output, MAR, Recent Results and Cardiac Rhythm SR.

## 2019-02-13 NOTE — PROGRESS NOTES
IDR Rounds this am with MD and team. 
Patient for discharge today. To up diet as tolerated Patient requesting to be discharge due to emergency with her son. Did not provide detail Made appointment with PCP. The earliest was on 2-19-19. Patient to see provider in [place] office since her provider do not have an appointment until 2-26-19. Patient agreed to Substance Abuse resources. On AVS. Discussed transportation. Patient called for ride- since [facility] transport may take a while CM to do follow-up call [facility] [address] VICTOR M RN  
[phone]

## 2019-02-13 NOTE — PROGRESS NOTES
Discharged to home. Patient inadvertently left prescriptions ilsa, attempted to call patient at home but both numbers listed on her EMR were incorrect/out of service. Prescriptions were for folic acid and thiamine. IV discontinued All medications and nutritional recommendations as well as the disease process of Alcoholic gastritis.

## 2019-02-14 ENCOUNTER — TELEPHONE (OUTPATIENT)
Dept: CASE MANAGEMENT | Age: 44
End: 2019-02-14

## 2019-02-14 LAB
O+P SPEC MICRO: NORMAL
O+P STL CONC: NORMAL
SPECIMEN SOURCE: NORMAL

## 2019-02-14 NOTE — ADT AUTH CERT NOTES
Utilization Reviews Pancreatitis - Care Day 4 (2/12/2019) by Radha Taylor RN Review Status Review Entered Completed 2/13/2019 12:33 Criteria Review Care Day: 4 Care Date: 2/12/2019 Level of Care: Inpatient Floor Guideline Day 2 Level Of Care (X) ICU[G]or floor Clinical Status  
(X) * Hemodynamic stability 2/13/2019 12:33:02 EST by Stephanie Shepard  
  VS:  T 98.1, P 78, R 19, /85, SPO2 96% RA  
  
(X) * Pain absent or reduced 2/13/2019 12:33:02 EST by Michel, [address] [facility] Routes  
(X) Oral diet as tolerated 2/13/2019 12:33:02 EST by Michel, [address] DIET Interventions  
(X) * NG tube absent 2/13/2019 12:33:02 EST by Stephanie Shepard  
  NO NG TUBE  
  
(X) Laboratory tests 2/13/2019 12:33:02 EST by Stephanie Shepard  
  HGB 9.6, HCT 32.4, PLT 94, K 3.0, BUCR 10, CA 8.1, MG 1.4, LIPASE >3000 (X) Observe for alcohol withdrawal if indicated 2/13/2019 12:33:02 EST by Stephanie Shepard  
  CIWA: 3,2,2,1,1  
  
  
2/13/2019 12:33:02 EST by Stephanie Shepard Subject: Additional Clinical Information MEDS:   
  
0.9% sodium chloride with KCl 40 mEq/L infusion Rate: 125 mL/hr Freq: CONTINUOUS Route: IV  
  
acetaminophen (TYLENOL) tablet 650 mg   
Dose: 650 mg  
Freq: EVERY 6 HOURS AS NEEDED Route: POX1  
  
albuterol (PROVENTIL VENTOLIN) nebulizer solution 2.5 mg   
Dose: 2.5 mg  
Freq: EVERY 4 HOURS AS NEEDED Route: NEBULIZATION  
  
alum-mag hydroxide-simeth (MYLANTA) oral suspension 30 mL Dose: 30 mL Freq: EVERY 4 HOURS AS NEEDED Route: PO  
  
cloNIDine HCl (CATAPRES) tablet 0.1 mg   
Dose: 0.1 mg  
Freq: 2 TIMES DAILY Route: PO  
  
dicyclomine (BENTYL) capsule 10 mg   
Dose: 10 mg  
Freq: 4 TIMES DAILY AS NEEDED Route: PO  
  
ferrous sulfate tablet 325 mg   
Dose: 325 mg  
Freq: DAILY Route: PO  
  
folic acid (FOLVITE) tablet 1 mg Dose: 1 mg Freq: DAILY Route: PO  
  
losartan (COZAAR) tablet 50 mg   
Dose: 50 mg  
 Freq: DAILY Route: PO  
  
melatonin tablet 6 mg Dose: 6 mg Freq: BEDTIME PRN Route: PO  
  
therapeutic multivitamin (THERAGRAN) tablet 1 Tab Dose: 1 Tab Freq: DAILY Route: PO  
  
thiamine HCL (B-1) tablet 100 mg Dose: 100 mg Freq: DAILY Route: PO  
  
traMADol (ULTRAM) tablet 50 mg   
Dose: 50 mg  
Freq: EVERY 6 HOURS AS NEEDED Route: POX4  
  
verapamil ER (CALAN-SR) tablet 120 mg   
Dose: 120 mg  
Freq: DAILY WITH BREAKFAST Route: PO  
  
alum-mag hydroxide-simeth (MYLANTA) oral suspension 30 mL Dose: 30 mL Freq: ONCE Route: PO  
  
potassium chloride (KLOR-CON) packet for solution 40 mEq Dose: 40 mEq Freq: EVERY 4 HOURS Route: PO  
  
magnesium sulfate 2 g/50 ml IVPB (premix or compounded) Dose: 2 g  
Freq: ONCE Route: IV  
  
  
  
2/13/2019 12:33:02 EST by Josh See Subject: Additional Clinical Information MEDICAL PROGRESS NOTE:  
  
Assessment / Plan:  
  
Update-Chest pain-epigastric, unlikely cardiacEKG-normal,trop-negResponded to gi cocktail  Alcohol gastritis/Acute alcoholic pancreatitis POAh/o chronic alcohol abuseCT Abd-Acute uncomplicated pancreatitis. 2. Marked hepatic steatosis. 3. Colonic diverticulosis. Lipase trending upClears,advance diet as tolerated-prn pain medications and antiemetics  Acute diarrhea- resolvedAwait results-Stool studies for wbc,ova/parasite/CX, r/o C diffContact precautions untill cdiff r/o  HTN, EssentialAdmit non complianceC/w home meds-  verapamil,clonidine,losartan-PRN hydralazine  H/O iron deficiency anemiaCheck iron panel, start iron tabs  High risk for alcohol withdrawalMVICIWA protocol,PRN Ativan  Type II diabetes mellitusNot on oral hypoglycemics at home, reports being diet controlled  -ISS  Hypokalemia/hypomagnesemia replace  Mild intermittent  Asthma  -PRN nebs  Morbid  ObesityBody mass index is 42.51 kg/m ². Counseled on Lifestyle modifications, AHA Diet ,weight loss strategies.  Code status: FullProphylaxis: LovenoxRecommended Disposition: Home w/Family     
  
  
  
  
  
  
  
* Milestone Pancreatitis - Care Day 3 (2/11/2019) by Medina Haley RN Review Status Review Entered Completed 2/13/2019 12:23 Criteria Review Care Day: 3 Care Date: 2/11/2019 Level of Care: Inpatient Floor Guideline Day 2 Level Of Care (X) ICU[G]or floor Clinical Status  
(X) * Hemodynamic stability 2/13/2019 12:23:46 EST by Alexander Munoz  
  VS:  T 98.1, P 68, R 20, /76, SPO2 100% RA  
  
( ) * Pain absent or reduced Interventions  
(X) * NG tube absent  
(X) Laboratory tests 2/13/2019 12:23:46 EST by Alexander Munoz  
  HGB 10.1, HCT 33.6, PLT 94, , CREAT 1.03, BUCR 10, GFRNA 58, LIPASE 1726  
  
(X) Observe for alcohol withdrawal if indicated 2/13/2019 12:23:46 EST by Demetria Corrales 1,3,4,2,3, 0, 8 Medications (X) Parenteral analgesia 2/13/2019 12:23:46 EST by Alexander Munoz Subject: Additional Clinical Information MEDS:  
  
0.9% sodium chloride Rate: 125 mL/hr Freq: CONTINUOUS Route: IV  
  
acetaminophen (TYLENOL) tablet 650 mg   
Dose: 650 mg  
Freq: EVERY 6 HOURS AS NEEDED Route: PO  
  
albuterol (PROVENTIL VENTOLIN) nebulizer solution 2.5 mg   
Dose: 2.5 mg  
Freq: EVERY 4 HOURS AS NEEDED Route: NEBULIZATION  
  
alum-mag hydroxide-simeth (MYLANTA) oral suspension 30 mL Dose: 30 mL Freq: EVERY 4 HOURS AS NEEDED Route: PO  
  
cloNIDine HCl (CATAPRES) tablet 0.1 mg   
Dose: 0.1 mg  
Freq: 2 TIMES DAILY Route: PO  
  
dicyclomine (BENTYL) capsule 10 mg   
Dose: 10 mg  
Freq: 4 TIMES DAILY AS NEEDED Route: POX2  
  
ferrous sulfate tablet 325 mg   
Dose: 325 mg  
Freq: DAILY Route: PO  
  
insulin lispro (HUMALOG) injection Freq: 4 TIMES DAILY BEFORE MEALS & NIGHTLY Route: SC  
  
labetalol (NORMODYNE;TRANDATE) 20 mg/4 mL (5 mg/mL) injection 20 mg   
Dose: 20 mg  
Freq: EVERY 4 HOURS AS NEEDED Route: IVX1 losartan (COZAAR) tablet 50 mg   
Dose: 50 mg  
Freq: DAILY Route: PO  
  
melatonin tablet 6 mg Dose: 6 mg Freq: BEDTIME PRN Route: PO  
  
verapamil ER (CALAN-SR) tablet 120 mg   
Dose: 120 mg  
Freq: DAILY WITH BREAKFAST Route: PO  
  
traMADol (ULTRAM) tablet 50 mg   
Dose: 50 mg  
Freq: EVERY 6 HOURS AS NEEDED Route: POX2  
  
pantoprazole (PROTONIX) tablet 40 mg   
Dose: 40 mg  
Freq: DAILY Route: PO  
  
  
  
2/13/2019 12:23:46 EST by Kb Del Castillo Subject: Additional Clinical Information EKG: Normal sinus rhythm   
 Possible Left atrial enlargement   
 Borderline ECG   
 When compared with ECG of 21-JUN-2018 21:24,   
 No significant change was found 2/13/2019 12:23:46 EST by Kb Del Castillo Subject: Additional Clinical Information Assessment / Plan:  
    
  
Acute diarrhea Send   Stool studies for wbc,ova/parasite/CX, r/o C diff Contact precautions untill cdiff r/o  
  
    
  
Alcohol gastritis/Acute alcoholic pancreatitis POA  
  
h/o chronic alcohol abuse CT Abd-Acute uncomplicated pancreatitis. 2. Marked hepatic steatosis. 3. Colonic diverticulosis. Lipase trending up Clears,advance diet as tolerated  
  
-prn pain medications and antiemetics  
  
    
  
HTN, Essential  
  
Admit non compliance C/w home meds-  verapamil,clonidine,losartan  
  
-PRN hydralazine  
  
    
  
H/O iron deficiency anemia Check iron panel, start iron tabs  
  
    
  
High risk for alcohol withdrawal  
  
MVI  
  
CIWA protocol,PRN Ativan  
  
    
  
Type II diabetes mellitus Not on oral hypoglycemics at home, reports being diet controlled    
  
-ISS  
  
    
  
Hypokalemia   
  
replace  
  
    
  
Mild intermittent  Asthma    
  
-PRN nebs  
  
    
  
Morbid Jeremiah & Brigido Body mass index is 42.51 kg/m ². Counseled on Lifestyle modifications, AHA Diet ,weight loss strategies.     
  
Code status: Full Prophylaxis: Lovenox Recommended Disposition: Home w/Family

## 2019-02-14 NOTE — PROGRESS NOTES
[facility] Hospitalist Progress Note    Date of Service: 4/15/2018    Chief Complaint  52 y.o. female admitted 4/10/2018 for AMS presumably due to opiate OD, as she responded to narcan. Found to have CVA in addition to campylobacter infection in her stool. She has an abnormal EEG.    Interval Problem Update  Continued expressive aphasia. More oriented, responsive today. Still with diarrhea. Mild HA. She denies specific complaints.    Consultants/Specialty  Neurology    Disposition  Rehab order placed. Family wanting to find a place closer to [place], [place] where the patient & her family live.  Will need to coordinate loop recorder and local follow up there.        Review of Systems   Constitutional: Negative for fever and malaise/fatigue.   HENT: Negative for congestion, nosebleeds and sinus pain.    Respiratory: Negative for cough and shortness of breath.    Cardiovascular: Negative for chest pain, palpitations and leg swelling.   Gastrointestinal: Positive for diarrhea. Negative for abdominal pain, blood in stool, nausea and vomiting.   Musculoskeletal:        Denies pain   Neurological: Positive for speech change and headaches. Negative for dizziness, sensory change, focal weakness and seizures.        Physical Exam  Laboratory/Imaging   Hemodynamics  Temp (24hrs), Av.5 °C (99.5 °F), Min:37.1 °C (98.7 °F), Max:38.1 °C (100.5 °F)   Temperature: 37.4 °C (99.4 °F)  Pulse  Av.6  Min: 41  Max: 111    Blood Pressure: 143/83      Respiratory  Respiration: 16, Pulse Oximetry: 91 %  RUL Breath Sounds: Clear, RML Breath Sounds: Clear, RLL Breath Sounds: Clear, JOLYNN Breath Sounds: Clear, LLL Breath Sounds: Clear    Fluids  No intake or output data in the 24 hours ending 04/15/18 0810  Nutrition  Orders Placed This Encounter   Procedures   • DIET ORDER     Standing Status:   Standing     Number of Occurrences:   1     Order Specific Question:   Diet:     Answer:   Full Liquid [11]     Order Specific Question:    Spiritual Care Partner Volunteer visited patient in Med Surg and Tele on February 13, 2019. Documented by: 
JEOVANY Campuzano Div  Paging Service 0[facility]([id number]) Consistency/Fluid modifications:     Answer:   Nectar Thick [2]     Order Specific Question:   Miscellaneous modifications:     Answer:   SLP - 1:1 Supervision by Nursing [21]     Physical Exam   Constitutional: She appears well-developed and well-nourished. She is cooperative. No distress.   Thin     HENT:   Head: Normocephalic and atraumatic.   Eyes: Conjunctivae and EOM are normal. Pupils are equal, round, and reactive to light. Right eye exhibits no nystagmus. Left eye exhibits no nystagmus. Right pupil is round and reactive. Left pupil is round and reactive. Pupils are equal.   Neck: Neck supple. No JVD present. No edema present.   Cardiovascular: Normal rate, regular rhythm, normal heart sounds and intact distal pulses.  Exam reveals no gallop and no friction rub.    No murmur heard.  SR on tele monitoring     Pulmonary/Chest: Effort normal and breath sounds normal. No stridor. No tachypnea. No respiratory distress. She has no decreased breath sounds. She has no wheezes. She has no rhonchi. She has no rales.   Not on O2   Abdominal: Normal appearance. She exhibits no abdominal bruit.   Musculoskeletal: Normal range of motion. She exhibits no edema.   Neurological: She is alert. She has normal strength. She is disoriented. No cranial nerve deficit or sensory deficit. She displays no seizure activity. GCS eye subscore is 4. GCS verbal subscore is 4. GCS motor subscore is 6.   Oriented to self, expressive dysphasia     Skin: Skin is warm and dry. She is not diaphoretic. No pallor.   Psychiatric: She has a normal mood and affect. Judgment normal. Her speech is delayed. She is slowed. She does not express impulsivity. She is attentive.   Nursing note and vitals reviewed.    Recent Labs      04/13/18   0357  04/15/18   0633   WBC  16.2*  20.5*   RBC  4.14*  4.05*   HEMOGLOBIN  12.5  12.2   HEMATOCRIT  37.1  36.1*   MCV  89.6  89.1   MCH  30.2  30.1   MCHC  33.7  33.8   RDW  44.3  46.5   PLATELETCT  123*  126*   MPV   10.5  10.9     Recent Labs      04/13/18   0357  04/13/18   2233  04/15/18   0633   SODIUM  145  145  147*   POTASSIUM  2.8*  3.4*  3.1*   CHLORIDE  112  114*  114*   CO2  22  22  25   GLUCOSE  144*  135*  117*   BUN  15  15  21   CREATININE  0.69  0.72  0.67   CALCIUM  8.1*  9.0  8.6         Recent Labs      04/13/18   0357   BNPBTYPENAT  252*              Assessment/Plan     * Stroke syndrome (CMS-HCC)   Assessment & Plan    Multifocal infarctions, largest in the left frontal lobe, left posterior occipital lobe, right parietal-occipital cortex, and right hemicerebellum, respectively  Neuro following.  Echo unremarkable. EF 70%. No bubble study. Discussed with Neurology. Given unclear etiology, LACI w bubble study to eval MAUREEN/possible PFO.  Carotid US normal.  Lipid panel showed LDL of 54 so will hold off on statin.  All other values were also WNL.  On ASA.  No plavix per neuro.  SR on tele.  Hypercoaguable state workup  Loop recorder as an outpatient for follow up outside the area          Drug overdose - unintentional- (present on admission)   Assessment & Plan    Buprenorphine patch removed PTA.   Patient was on heavy doses of narcotics at home for chronic pain vs fibromyalgia  Denies suicidal ideation or history  Weaning Norco            Seizure disorder as sequela of cerebrovascular accident (CMS-LTAC, located within [facility])- (present on admission)   Assessment & Plan    On BID Keppra.  Neurology on.        Chronic pain syndrome- (present on admission)   Assessment & Plan    Currently denies pain. Only required norco once overnight.  Wean Norco          Hypokalemia   Assessment & Plan    Secondary to wasting from diarrhea.   Tolerating oral replacement  Klor Con increased to 40 BID 4/15        Aspiration pneumonia (CMS-HCC)- (present on admission)   Assessment & Plan    Treated with Unasyn.  Repeat cxr in appox 4-6 weeks to assess for resolution. Will continue to monitor respiratory status closely while inpatient.  CT chest normal         Campylobacter diarrhea- (present on admission)   Assessment & Plan    Campylobacter on cultures from transferring facility. C-diff negative here on 4/10/18.  Still having frequent bouts of watery diarrhea. Possible contribution from opioid withdrawal.  No physical exam findings of enteritis.  PRN loperamide  Consider further GI studies if symptoms continue        Hyperglycemia- (present on admission)   Assessment & Plan    A1C normal at 5.4. No hx of DM.  Will continue to monitor on a.m. labs.            Leukocytosis- (present on admission)   Assessment & Plan    WBC trending down.  One episode of fever 100.5  Completed Unasyn. Azithro complete 4/16.  Procalcitonin was normal.  On Cortef due to hypotension          Elevated brain natriuretic peptide (BNP) level- (present on admission)   Assessment & Plan    BNP 8000s on arrival.   TTE showed normal diastolic function & EF 70%.  No evidence of FVO on exam.          Elevated troponin- (present on admission)   Assessment & Plan    No CP or hx of CAD.  Normal echocardiogram.  Repeat trending down.        Hypotension- (present on admission)   Assessment & Plan    No hypotension since transfer to neuro. BP stable. Will allow for permissive hypertension in light of multiple strokes.  Continue low dose Cortef          Quality-Core Measures   Reviewed items::  Labs reviewed, Medications reviewed, Radiology images reviewed and EKG reviewed  Bishop catheter::  No Bishop  DVT prophylaxis pharmacological::  Heparin  : JESSICA hose bilaterally.  Ulcer Prophylaxis::  Not indicated  Assessed for rehabilitation services:  Patient was assess for and/or received rehabilitation services during this hospitalization

## 2019-02-14 NOTE — TELEPHONE ENCOUNTER
Case Management Follow-up call  CM called pt to discuss discharge plan. Pts phone was disconnected at this time.        [facility] MSW, CASANDRA

## 2019-06-03 ENCOUNTER — APPOINTMENT (OUTPATIENT)
Dept: GENERAL RADIOLOGY | Age: 44
End: 2019-06-03
Attending: EMERGENCY MEDICINE
Payer: MEDICAID

## 2019-06-03 ENCOUNTER — HOSPITAL ENCOUNTER (EMERGENCY)
Age: 44
Discharge: HOME | End: 2019-06-03
Attending: EMERGENCY MEDICINE
Payer: MEDICAID

## 2019-06-03 VITALS
TEMPERATURE: 98.2 F | HEIGHT: 62 IN | WEIGHT: 253.31 LBS | HEART RATE: 91 BPM | OXYGEN SATURATION: 99 % | BODY MASS INDEX: 46.61 KG/M2 | RESPIRATION RATE: 16 BRPM | DIASTOLIC BLOOD PRESSURE: 96 MMHG | SYSTOLIC BLOOD PRESSURE: 164 MMHG

## 2019-06-03 DIAGNOSIS — M48.03 SPINAL STENOSIS OF CERVICOTHORACIC REGION: Primary | ICD-10-CM

## 2019-06-03 DIAGNOSIS — R03.0 ELEVATED BLOOD PRESSURE READING: ICD-10-CM

## 2019-06-03 LAB
ALBUMIN SERPL-MCNC: 3.6 G/DL (ref 3.5–5)
ALBUMIN/GLOB SERPL: 0.8 {RATIO} (ref 1.1–2.2)
ALP SERPL-CCNC: 73 U/L (ref 45–117)
ALT SERPL-CCNC: 25 U/L (ref 12–78)
ANION GAP SERPL CALC-SCNC: 5 MMOL/L (ref 5–15)
AST SERPL-CCNC: 24 U/L (ref 15–37)
BASOPHILS # BLD: 0 K/UL (ref 0–0.1)
BASOPHILS NFR BLD: 0 % (ref 0–1)
BILIRUB SERPL-MCNC: 0.6 MG/DL (ref 0.2–1)
BUN SERPL-MCNC: 10 MG/DL (ref 6–20)
BUN/CREAT SERPL: 12 (ref 12–20)
CALCIUM SERPL-MCNC: 8.6 MG/DL (ref 8.5–10.1)
CHLORIDE SERPL-SCNC: 106 MMOL/L (ref 97–108)
CK SERPL-CCNC: 99 U/L (ref 26–192)
CO2 SERPL-SCNC: 27 MMOL/L (ref 21–32)
CREAT SERPL-MCNC: 0.83 MG/DL (ref 0.55–1.02)
DIFFERENTIAL METHOD BLD: ABNORMAL
EOSINOPHIL # BLD: 0.1 K/UL (ref 0–0.4)
EOSINOPHIL NFR BLD: 2 % (ref 0–7)
ERYTHROCYTE [DISTWIDTH] IN BLOOD BY AUTOMATED COUNT: 18.6 % (ref 11.5–14.5)
GLOBULIN SER CALC-MCNC: 4.3 G/DL (ref 2–4)
GLUCOSE SERPL-MCNC: 92 MG/DL (ref 65–100)
HCT VFR BLD AUTO: 28.7 % (ref 35–47)
HGB BLD-MCNC: 8.5 G/DL (ref 11.5–16)
IMM GRANULOCYTES # BLD AUTO: 0 K/UL (ref 0–0.04)
IMM GRANULOCYTES NFR BLD AUTO: 1 % (ref 0–0.5)
LYMPHOCYTES # BLD: 2.5 K/UL (ref 0.8–3.5)
LYMPHOCYTES NFR BLD: 36 % (ref 12–49)
MCH RBC QN AUTO: 21.4 PG (ref 26–34)
MCHC RBC AUTO-ENTMCNC: 29.6 G/DL (ref 30–36.5)
MCV RBC AUTO: 72.1 FL (ref 80–99)
MONOCYTES # BLD: 0.7 K/UL (ref 0–1)
MONOCYTES NFR BLD: 10 % (ref 5–13)
NEUTS SEG # BLD: 3.7 K/UL (ref 1.8–8)
NEUTS SEG NFR BLD: 51 % (ref 32–75)
NRBC # BLD: 0 K/UL (ref 0–0.01)
NRBC BLD-RTO: 0 PER 100 WBC
PLATELET # BLD AUTO: 245 K/UL (ref 150–400)
PMV BLD AUTO: 11.5 FL (ref 8.9–12.9)
POTASSIUM SERPL-SCNC: 3.7 MMOL/L (ref 3.5–5.1)
PROT SERPL-MCNC: 7.9 G/DL (ref 6.4–8.2)
RBC # BLD AUTO: 3.98 M/UL (ref 3.8–5.2)
SODIUM SERPL-SCNC: 138 MMOL/L (ref 136–145)
TROPONIN I SERPL-MCNC: <0.05 NG/ML
WBC # BLD AUTO: 7.2 K/UL (ref 3.6–11)

## 2019-06-03 PROCEDURE — 71046 X-RAY EXAM CHEST 2 VIEWS: CPT

## 2019-06-03 PROCEDURE — 99284 EMERGENCY DEPT VISIT MOD MDM: CPT

## 2019-06-03 PROCEDURE — 82550 ASSAY OF CK (CPK): CPT

## 2019-06-03 PROCEDURE — 80053 COMPREHEN METABOLIC PANEL: CPT

## 2019-06-03 PROCEDURE — 36415 COLL VENOUS BLD VENIPUNCTURE: CPT

## 2019-06-03 PROCEDURE — 72050 X-RAY EXAM NECK SPINE 4/5VWS: CPT

## 2019-06-03 PROCEDURE — 85025 COMPLETE CBC W/AUTO DIFF WBC: CPT

## 2019-06-03 PROCEDURE — 93005 ELECTROCARDIOGRAM TRACING: CPT

## 2019-06-03 PROCEDURE — 84484 ASSAY OF TROPONIN QUANT: CPT

## 2019-06-03 RX ORDER — PREDNISONE 20 MG/1
20 TABLET ORAL DAILY
Qty: 5 TAB | Refills: 0 | Status: SHIPPED | OUTPATIENT
Start: 2019-06-03 | End: 2019-06-03

## 2019-06-03 RX ORDER — PREDNISONE 20 MG/1
20 TABLET ORAL DAILY
Qty: 5 TAB | Refills: 0 | Status: SHIPPED | OUTPATIENT
Start: 2019-06-03 | End: 2019-06-13

## 2019-06-04 LAB
ATRIAL RATE: 81 BPM
CALCULATED P AXIS, ECG09: 53 DEGREES
CALCULATED R AXIS, ECG10: -13 DEGREES
CALCULATED T AXIS, ECG11: 58 DEGREES
DIAGNOSIS, 93000: NORMAL
P-R INTERVAL, ECG05: 148 MS
Q-T INTERVAL, ECG07: 384 MS
QRS DURATION, ECG06: 88 MS
QTC CALCULATION (BEZET), ECG08: 446 MS
VENTRICULAR RATE, ECG03: 81 BPM

## 2019-06-04 NOTE — ED NOTES
Patient given discharge instructions By Dr Analia Escobar. Patient was able to verbalize understanding of instructions. Questions answered  Patient ambulated out of ED in stable condition.

## 2019-06-04 NOTE — DISCHARGE INSTRUCTIONS
Patient Education        Pinched Nerve in the Neck: Care Instructions  Your Care Instructions  A pinched nerve in the neck happens when a vertebra or disc in the upper part of your spine is damaged. This damage can happen because of an injury. Or it can just happen with age. The changes caused by the damage may put pressure on a nearby nerve root, pinching it. This causes symptoms such as sharp pain in your neck, shoulder, arm, hand, or back. You may also have tingling or numbness. Sometimes it makes your arm weaker. The symptoms are usually worse when you turn your head or strain your neck. For many people, the symptoms get better over time and finally go away. Early treatment usually includes medicines for pain and swelling. Sometimes physical therapy and special exercises may help. Follow-up care is a key part of your treatment and safety. Be sure to make and go to all appointments, and call your doctor if you are having problems. It's also a good idea to know your test results and keep a list of the medicines you take. How can you care for yourself at home? · Be safe with medicines. Read and follow all instructions on the label. ? If the doctor gave you a prescription medicine for pain, take it as prescribed. ? If you are not taking a prescription pain medicine, ask your doctor if you can take an over-the-counter medicine. · Try using a heating pad on a low or medium setting for 15 to 20 minutes every 2 or 3 hours. Try a warm shower in place of one session with the heating pad. You can also buy single-use heat wraps that last up to 8 hours. · You can also try an ice pack for 10 to 15 minutes every 2 to 3 hours. There isn't strong evidence that either heat or ice will help. But you can try them to see if they help you. · Don't spend too long in one position. Take short breaks to move around and change positions. · Wear a seat belt and shoulder harness when you are in a car.   · Sleep with a pillow under your head and neck that keeps your neck straight. · If you were given a neck brace (cervical collar) to limit neck motion, wear it as instructed for as many days as your doctor tells you to. Do not wear it longer than you were told to. Wearing a brace for too long can lead to neck stiffness and can weaken the neck muscles. · Follow your doctor's instructions for gentle neck-stretching exercises. · Do not smoke. Smoking can slow healing of your discs. If you need help quitting, talk to your doctor about stop-smoking programs and medicines. These can increase your chances of quitting for good. · Avoid strenuous work or exercise until your doctor says it is okay. When should you call for help? Call 911 anytime you think you may need emergency care. For example, call if:    · You are unable to move an arm or a leg at all.   [facility] your doctor now or seek immediate medical care if:    · You have new or worse symptoms in your arms, legs, chest, belly, or buttocks. Symptoms may include:  ? Numbness or tingling. ? Weakness. ? Pain.     · You lose bladder or bowel control.    Watch closely for changes in your health, and be sure to contact your doctor if:    · You are not getting better as expected. Where can you learn more? Go to http://estefania-gian.info/. Enter U893 in the search box to learn more about \"Pinched Nerve in the Neck: Care Instructions. \"  Current as of: September 20, 2018  Content Version: 11.9  © 0620-6521 [employer]. Care instructions adapted under license by [employer] (which disclaims liability or warranty for this information). If you have questions about a medical condition or this instruction, always ask your healthcare professional. [address] any warranty or liability for your use of this information.

## 2019-06-25 ENCOUNTER — HOSPITAL ENCOUNTER (EMERGENCY)
Age: 44
Discharge: HOME | End: 2019-06-25
Attending: EMERGENCY MEDICINE
Payer: MEDICAID

## 2019-06-25 VITALS
HEART RATE: 103 BPM | BODY MASS INDEX: 46.01 KG/M2 | DIASTOLIC BLOOD PRESSURE: 88 MMHG | WEIGHT: 250 LBS | TEMPERATURE: 97.9 F | SYSTOLIC BLOOD PRESSURE: 169 MMHG | RESPIRATION RATE: 27 BRPM | HEIGHT: 62 IN | OXYGEN SATURATION: 100 %

## 2019-06-25 DIAGNOSIS — F10.930 ALCOHOL WITHDRAWAL SYNDROME WITHOUT COMPLICATION (HCC): Primary | ICD-10-CM

## 2019-06-25 DIAGNOSIS — F14.90 COCAINE USE: ICD-10-CM

## 2019-06-25 DIAGNOSIS — N30.00 ACUTE CYSTITIS WITHOUT HEMATURIA: ICD-10-CM

## 2019-06-25 LAB
ALBUMIN SERPL-MCNC: 3.9 G/DL (ref 3.5–5)
ALBUMIN/GLOB SERPL: 0.8 {RATIO} (ref 1.1–2.2)
ALP SERPL-CCNC: 74 U/L (ref 45–117)
ALT SERPL-CCNC: 26 U/L (ref 12–78)
AMPHET UR QL SCN: NEGATIVE
ANION GAP SERPL CALC-SCNC: 12 MMOL/L (ref 5–15)
APPEARANCE UR: ABNORMAL
AST SERPL-CCNC: 31 U/L (ref 15–37)
BACTERIA URNS QL MICRO: NEGATIVE /HPF
BARBITURATES UR QL SCN: NEGATIVE
BASOPHILS # BLD: 0.1 K/UL (ref 0–0.1)
BASOPHILS NFR BLD: 1 % (ref 0–1)
BENZODIAZ UR QL: NEGATIVE
BILIRUB SERPL-MCNC: 0.7 MG/DL (ref 0.2–1)
BILIRUB UR QL: NEGATIVE
BUN SERPL-MCNC: 13 MG/DL (ref 6–20)
BUN/CREAT SERPL: 15 (ref 12–20)
CALCIUM SERPL-MCNC: 8.8 MG/DL (ref 8.5–10.1)
CANNABINOIDS UR QL SCN: NEGATIVE
CHLORIDE SERPL-SCNC: 101 MMOL/L (ref 97–108)
CK SERPL-CCNC: 169 U/L (ref 26–192)
CO2 SERPL-SCNC: 25 MMOL/L (ref 21–32)
COCAINE UR QL SCN: POSITIVE
COLOR UR: ABNORMAL
CREAT SERPL-MCNC: 0.89 MG/DL (ref 0.55–1.02)
DIFFERENTIAL METHOD BLD: ABNORMAL
DRUG SCRN COMMENT,DRGCM: ABNORMAL
EOSINOPHIL # BLD: 0.1 K/UL (ref 0–0.4)
EOSINOPHIL NFR BLD: 1 % (ref 0–7)
EPITH CASTS URNS QL MICRO: ABNORMAL /LPF
ERYTHROCYTE [DISTWIDTH] IN BLOOD BY AUTOMATED COUNT: 18.5 % (ref 11.5–14.5)
ETHANOL SERPL-MCNC: 205 MG/DL
GLOBULIN SER CALC-MCNC: 4.9 G/DL (ref 2–4)
GLUCOSE SERPL-MCNC: 149 MG/DL (ref 65–100)
GLUCOSE UR STRIP.AUTO-MCNC: NEGATIVE MG/DL
HCT VFR BLD AUTO: 28.7 % (ref 35–47)
HGB BLD-MCNC: 8.4 G/DL (ref 11.5–16)
HGB UR QL STRIP: ABNORMAL
IMM GRANULOCYTES # BLD AUTO: 0.3 K/UL (ref 0–0.04)
IMM GRANULOCYTES NFR BLD AUTO: 5 % (ref 0–0.5)
KETONES UR QL STRIP.AUTO: NEGATIVE MG/DL
LEUKOCYTE ESTERASE UR QL STRIP.AUTO: ABNORMAL
LIPASE SERPL-CCNC: 63 U/L (ref 73–393)
LYMPHOCYTES # BLD: 2.7 K/UL (ref 0.8–3.5)
LYMPHOCYTES NFR BLD: 42 % (ref 12–49)
MCH RBC QN AUTO: 19.8 PG (ref 26–34)
MCHC RBC AUTO-ENTMCNC: 29.3 G/DL (ref 30–36.5)
MCV RBC AUTO: 67.7 FL (ref 80–99)
METHADONE UR QL: NEGATIVE
MONOCYTES # BLD: 0.5 K/UL (ref 0–1)
MONOCYTES NFR BLD: 7 % (ref 5–13)
NEUTS SEG # BLD: 2.8 K/UL (ref 1.8–8)
NEUTS SEG NFR BLD: 44 % (ref 32–75)
NITRITE UR QL STRIP.AUTO: NEGATIVE
NRBC # BLD: 0 K/UL (ref 0–0.01)
NRBC BLD-RTO: 0 PER 100 WBC
OPIATES UR QL: NEGATIVE
PCP UR QL: NEGATIVE
PH UR STRIP: 5.5 [PH] (ref 5–8)
PLATELET # BLD AUTO: 225 K/UL (ref 150–400)
PMV BLD AUTO: 10.3 FL (ref 8.9–12.9)
POTASSIUM SERPL-SCNC: 4 MMOL/L (ref 3.5–5.1)
PROT SERPL-MCNC: 8.8 G/DL (ref 6.4–8.2)
PROT UR STRIP-MCNC: 30 MG/DL
RBC # BLD AUTO: 4.24 M/UL (ref 3.8–5.2)
RBC #/AREA URNS HPF: ABNORMAL /HPF (ref 0–5)
RBC MORPH BLD: ABNORMAL
SODIUM SERPL-SCNC: 138 MMOL/L (ref 136–145)
SP GR UR REFRACTOMETRY: 1.02 (ref 1–1.03)
TROPONIN I SERPL-MCNC: <0.05 NG/ML
UA: UC IF INDICATED,UAUC: ABNORMAL
UROBILINOGEN UR QL STRIP.AUTO: 1 EU/DL (ref 0.2–1)
WBC # BLD AUTO: 6.5 K/UL (ref 3.6–11)
WBC URNS QL MICRO: ABNORMAL /HPF (ref 0–4)

## 2019-06-25 PROCEDURE — 93005 ELECTROCARDIOGRAM TRACING: CPT

## 2019-06-25 PROCEDURE — 74011000250 HC RX REV CODE- 250: Performed by: PHYSICIAN ASSISTANT

## 2019-06-25 PROCEDURE — 85025 COMPLETE CBC W/AUTO DIFF WBC: CPT

## 2019-06-25 PROCEDURE — 83690 ASSAY OF LIPASE: CPT

## 2019-06-25 PROCEDURE — 80053 COMPREHEN METABOLIC PANEL: CPT

## 2019-06-25 PROCEDURE — 82550 ASSAY OF CK (CPK): CPT

## 2019-06-25 PROCEDURE — 81001 URINALYSIS AUTO W/SCOPE: CPT

## 2019-06-25 PROCEDURE — 36415 COLL VENOUS BLD VENIPUNCTURE: CPT

## 2019-06-25 PROCEDURE — 84484 ASSAY OF TROPONIN QUANT: CPT

## 2019-06-25 PROCEDURE — 96365 THER/PROPH/DIAG IV INF INIT: CPT

## 2019-06-25 PROCEDURE — 80307 DRUG TEST PRSMV CHEM ANLYZR: CPT

## 2019-06-25 PROCEDURE — 74011250637 HC RX REV CODE- 250/637: Performed by: PHYSICIAN ASSISTANT

## 2019-06-25 PROCEDURE — 99284 EMERGENCY DEPT VISIT MOD MDM: CPT

## 2019-06-25 PROCEDURE — 74011250636 HC RX REV CODE- 250/636: Performed by: PHYSICIAN ASSISTANT

## 2019-06-25 RX ORDER — CEPHALEXIN 500 MG/1
500 CAPSULE ORAL 2 TIMES DAILY
Qty: 14 CAP | Refills: 0 | Status: SHIPPED | OUTPATIENT
Start: 2019-06-25 | End: 2019-07-02

## 2019-06-25 RX ORDER — ONDANSETRON 4 MG/1
4 TABLET, ORALLY DISINTEGRATING ORAL
Qty: 10 TAB | Refills: 0 | Status: SHIPPED | OUTPATIENT
Start: 2019-06-25

## 2019-06-25 RX ORDER — ONDANSETRON 4 MG/1
4 TABLET, ORALLY DISINTEGRATING ORAL
Status: COMPLETED | OUTPATIENT
Start: 2019-06-25 | End: 2019-06-25

## 2019-06-25 RX ADMIN — ONDANSETRON 4 MG: 4 TABLET, ORALLY DISINTEGRATING ORAL at 14:04

## 2019-06-25 RX ADMIN — FOLIC ACID: 5 INJECTION, SOLUTION INTRAMUSCULAR; INTRAVENOUS; SUBCUTANEOUS at 13:28

## 2019-06-25 NOTE — DISCHARGE INSTRUCTIONS
Patient Education        Learning About Alcohol Withdrawal  What is alcohol withdrawal?    If you drink alcohol regularly (more than a few drinks on most days) and then suddenly stop or cut down, you may go through some physical and emotional problems while the alcohol clears out of your system. This is called withdrawal. Clearing the alcohol from your body is called detoxification, or detox. What are the symptoms? Symptoms of alcohol withdrawal may start as soon as 4 to 12 hours after you stop drinking. Or they may not start until several days after the last drink. Mild symptoms include:  · Nausea. · Sweating. · Shakiness. · Diarrhea. · Intense worry. · Disturbed sleep. · Headache. More severe symptoms include:  · Vomiting or belly pain. · Being confused, upset, and irritable. · Changed sensations. You might feel things on your body that aren't really there. Or you may see or hear things that aren't there. · Trembling. · Being short of breath or having pain in your chest.  · Having seizures. Symptoms may peak within a few days. Mild symptoms can last for a few weeks. If your symptoms are severe, you'll need to see a doctor. What is the treatment for alcohol withdrawal?  Most people may be able to cut down or stop drinking with only mild withdrawal. They can stay safe by simply resting, drinking lots of fluids, and eating healthy foods. But people who drink large amounts of alcohol or are at risk for severe withdrawal symptoms should not try to detox at home unless they work closely with a doctor to manage it. A person can die of severe alcohol withdrawal.  Before you stop drinking, talk to your doctor about how you plan to stop. Be completely honest about how much you've been drinking. Your doctor will figure out if you need to detox in a medical center. You may get medicine to treat the symptoms whether you are at home or in a medical center. Medicine that treats seizures can also help.  Your doctor will explain what types of medicine might help you. You may start with a high dose and then take smaller amounts over several days. There's also medicine that can help you avoid alcohol while you recover. How can you manage your withdrawal and recovery? Here are a few tips that can help you to not start drinking again. · Make sure there's no alcohol in the house. This includes drinks as well as liquid medicines, rubbing alcohol, and certain flavorings like vanilla extract. · Try not to hang out with people you used to drink with. · Don't go it alone. Spend time with people who support the changes you are making in your life. This includes asking for advice and help from people who have stopped drinking. You might also try mutual support groups such as Alcoholics Anonymous. · Drink lots of fluids. · Eat snacks such as fruit, cheese and crackers, and pretzels. High-carbohydrate foods may help reduce the craving for alcohol. What happens after withdrawal?  It can be hard to stop drinking. But after you clear the alcohol from your system, you can start the next, healthier part of your life. After detox, you will focus on staying alcohol-free. You can learn skills that you can use to stay abstinent (or sober) as you recover. Finding new ways to deal with life's challenges, without drinking, takes time and effort. Recovery is a long-term process. It's not something you can achieve in a few weeks. Most people get some type of therapy, such as group counseling. You also may need medicine to help you stay sober. Treatment doesn't focus on alcohol use alone. It may address other parts of your life, like your relationships, work, medical problems, and home life. Treatment, support, patience, and commitment will help you make the changes you need to live a chase life without alcohol.  You may find, over time, that the process gets easier, life becomes more joyous, and your connections to others becomes more rewarding. Where can you find help? [facility] Treatment Services . This service from the [facility] and [place] [address] can help you find local alcohol treatment services. Search online at [employer]. [place] [employer].gov or call 5-199-945-FQPY ([phone]), or TDD 3-400.929.4106. Where can you learn more? Go to http://estefania-gian.info/. Enter A011 in the search box to learn more about \"Learning About Alcohol Withdrawal.\"  Current as of: May 7, 2018  Content Version: 11.9  © 2886-5248 [employer]. Care instructions adapted under license by [employer] (which disclaims liability or warranty for this information). If you have questions about a medical condition or this instruction, always ask your healthcare professional. [address] any warranty or liability for your use of this information. Patient Education        Alcohol and Drug Problems: Care Instructions  Your Care Instructions    You can improve your life and health by stopping your use of alcohol or drugs. Ending dependency on alcohol or drugs may help you feel and sleep better. You may get along better with your family, friends, and coworkers. There are medicines and programs that can help. Follow-up care is a key part of your treatment and safety. Be sure to make and go to all appointments, and call your doctor if you are having problems. It's also a good idea to know your test results and keep a list of the medicines you take. How can you care for yourself at home? · If you have been given medicine to help keep you sober or reduce your cravings, be sure to take it as prescribed. · Talk to your doctor about programs that can help you stop using drugs or drinking alcohol. · If your doctor prescribes disulfiram (Antabuse), do not drink any alcohol while you are taking this medicine.  You may have severe, even life-threatening, side effects from even small amounts of alcohol. · Do not tempt yourself by keeping alcohol or drugs in your home. · Learn how to say no when other people drink or use drugs. Or don't spend time with people who drink or use drugs. · Use the time and money spent on drinking or drugs to do something fun with your family or friends. Preventing a relapse  · Do not drink alcohol or use drugs at all. Using any amount of alcohol or drugs greatly increases your risk for relapse. · Seek help from organizations such as Alcoholics Anonymous, Narcotics Anonymous, or treatment facilities if you feel the need to drink alcohol or use drugs again. · Remember that recovery is a lifelong process. · Stay away from situations, friends, or places that may lead you to drink or use drugs. · Have a plan to spot and deal with relapse. Learn to recognize changes in your thinking that lead you to drink or use drugs. These are warning signs. Get help before you start to drink or use drugs again. · Get help as soon as you can if you relapse. Some people make a plan with another person that outlines what they want that person to do for them if they relapse. The plan usually includes how to handle the relapse and who to notify in case of relapse. · Don't give up. Remember that a relapse does not mean that you have failed. Use the experience to learn the triggers that lead you to drink or use drugs. Then quit again. Many people have several relapses before they are able to quit for good. When should you call for help? Call 911 anytime you think you may need emergency care.  For example, call if:    · You feel you cannot stop from hurting yourself or someone else.   [facility] your doctor now or seek immediate medical care if:    · You have serious withdrawal symptoms such as confusion, hallucinations, or severe trembling.    Watch closely for changes in your health, and be sure to contact your doctor if:    · You have a relapse.     · You need more help or support to stop. Where can you learn more? Go to http://estefania-gian.info/. Enter [id number] in the search box to learn more about \"Alcohol and Drug Problems: Care Instructions. \"  Current as of: May 7, 2018  Content Version: 11.9  © 2112-2162 [employer]. Care instructions adapted under license by [employer] (which disclaims liability or warranty for this information). If you have questions about a medical condition or this instruction, always ask your healthcare professional. [place] [address] any warranty or liability for your use of this information. Patient Education        Urinary Tract Infection in Women: Care Instructions  Your Care Instructions    A urinary tract infection, or UTI, is a general term for an infection anywhere between the kidneys and the urethra (where urine comes out). Most UTIs are bladder infections. They often cause pain or burning when you urinate. UTIs are caused by bacteria and can be cured with antibiotics. Be sure to complete your treatment so that the infection goes away. Follow-up care is a key part of your treatment and safety. Be sure to make and go to all appointments, and call your doctor if you are having problems. It's also a good idea to know your test results and keep a list of the medicines you take. How can you care for yourself at home? · Take your antibiotics as directed. Do not stop taking them just because you feel better. You need to take the full course of antibiotics. · Drink extra water and other fluids for the next day or two. This may help wash out the bacteria that are causing the infection. (If you have kidney, heart, or liver disease and have to limit fluids, talk with your doctor before you increase your fluid intake.)  · Avoid drinks that are carbonated or have caffeine. They can irritate the bladder. · Urinate often. Try to empty your bladder each time.   · To relieve pain, take a hot bath or lay a heating pad set on low over your lower belly or genital area. Never go to sleep with a heating pad in place. To prevent UTIs  · Drink plenty of water each day. This helps you urinate often, which clears bacteria from your system. (If you have kidney, heart, or liver disease and have to limit fluids, talk with your doctor before you increase your fluid intake.)  · Urinate when you need to. · Urinate right after you have sex. · Change sanitary pads often. · Avoid douches, bubble baths, feminine hygiene sprays, and other feminine hygiene products that have deodorants. · After going to the bathroom, wipe from front to back. When should you call for help? Call your doctor now or seek immediate medical care if:    · Symptoms such as fever, chills, nausea, or vomiting get worse or appear for the first time.     · You have new pain in your back just below your rib cage. This is called flank pain.     · There is new blood or pus in your urine.     · You have any problems with your antibiotic medicine.    Watch closely for changes in your health, and be sure to contact your doctor if:    · You are not getting better after taking an antibiotic for 2 days.     · Your symptoms go away but then come back. Where can you learn more? Go to http://estefania-gian.info/. Enter F285 in the search box to learn more about \"Urinary Tract Infection in Women: Care Instructions. \"  Current as of: March 20, 2018  Content Version: 11.9  © 9716-9355 [employer]. Care instructions adapted under license by [employer] (which disclaims liability or warranty for this information). If you have questions about a medical condition or this instruction, always ask your healthcare professional. [address] any warranty or liability for your use of this information.

## 2019-06-25 NOTE — ED PROVIDER NOTES
EMERGENCY DEPARTMENT HISTORY AND PHYSICAL EXAM    Date: 6/25/2019  Patient Name: Harsh Baker    History of Presenting Illness     Chief Complaint   Patient presents with    Alcohol Problem         History Provided By: Patient    HPI: Harsh Baker is a 37 y.o. female with a PMH of asthma, hypertension, depression, diabetes, sleep apnea, alcoholism who presents with \"detoxing from alcohol. \"  Patient states she is in a rehab program for alcoholism and gets injections and as well as pills on a regular basis but states she recently pills so she became frustrated and started drinking in the past week. Patient states this morning she started having chest pain, was diaphoretic and started feeling generalized malaise. Patient states she normally drinks 3 beers a day and had a 24 ounce beer this morning. But states she drank a beer after she started feeling bad. Patient states chest pain felt like a \"fluttering. \"  Patient denies any chest pain currently, no abdominal pain, no nausea or vomiting and no other drugs. PCP: Cheyanne Montanez NP    Current Outpatient Medications   Medication Sig Dispense Refill    ondansetron (ZOFRAN ODT) 4 mg disintegrating tablet Take 1 Tab by mouth every eight (8) hours as needed for Nausea. 10 Tab 0    cephALEXin (KEFLEX) 500 mg capsule Take 1 Cap by mouth two (2) times a day for 7 days. 14 Cap 0    naltrexone microspheres (VIVITROL) 380 mg ER injection 380 mg by IntraMUSCular route every thirty (30) days. Indications: alcoholism      folic acid (FOLVITE) 1 mg tablet Take 1 Tab by mouth daily. 30 Tab 0    thiamine HCL (B-1) 100 mg tablet Take 1 Tab by mouth daily. 30 Tab 0    VENTOLIN HFA 90 mcg/actuation inhaler Take 2 Inhalation by inhalation four (4) times daily as needed. 0    cholecalciferol (VITAMIN D3) 50,000 unit capsule Take 1 Cap by mouth every seven (7) days. 0    VITAMIN D2 50,000 unit capsule Take 1 Cap by mouth every seven (7) days.   0    ferrous sulfate (IRON) 325 mg (65 mg iron) EC tablet Take 1 Tab by mouth daily. 0    cloNIDine HCl (CATAPRES) 0.1 mg tablet Take 0.1 mg by mouth two (2) times a day.  0    losartan (COZAAR) 50 mg tablet Take 50 mg by mouth daily. 0    meloxicam (MOBIC) 15 mg tablet Take 15 mg by mouth daily. 0    pantoprazole (PROTONIX) 40 mg tablet Take 40 mg by mouth daily. 0    VITAMIN B-1, MONONITRATE, 100 mg tablet Take 100 mg by mouth daily. 0    ketotifen (ZADITOR) 0.025 % (0.035 %) ophthalmic solution Administer 1 Drop to both eyes two (2) times daily as needed (ALLERGIES).  cetirizine (ZYRTEC) 10 mg tablet Take 10 mg by mouth daily as needed for Allergies.  verapamil ER (VERELAN) 120 mg ER capsule Take 120 mg by mouth daily.  hydrOXYzine pamoate (VISTARIL) 25 mg capsule Take 25 mg by mouth three (3) times daily as needed for Itching.  albuterol (PROVENTIL, VENTOLIN) 90 mcg/actuation inhaler Take 1-2 Puffs by inhalation every four (4) hours as needed for Wheezing or Shortness of Breath. 17 g 0    fluticasone-salmeterol (ADVAIR DISKUS) 250-50 mcg/dose diskus inhaler Take 1 Puff by inhalation two (2) times a day. Past History     Past Medical History:  Past Medical History:   Diagnosis Date    Asthma     Diabetes ([address]     Hypertension     Psychiatric disorder     depression    Sleep apnea        Past Surgical History:  Past Surgical History:   Procedure Laterality Date    HX TUBAL LIGATION         Family History:  Family History   Problem Relation Age of Onset    Alcohol abuse Mother     Alcohol abuse Father        Social History:  Social History     Tobacco Use    Smoking status: Never Smoker    Smokeless tobacco: Never Used   Substance Use Topics    Alcohol use: Yes     Comment: 2 or 3 40's a day 7/18/17    Drug use: Not Currently     Types: Cocaine     Comment: using millicent since age 11, no rehab in 13 yrs. Allergies:   Allergies   Allergen Reactions    Morphine Itching     Itching on torso, legs, arms    Aspirin Rash     Patient denies allergy, sometimes itches    Lisinopril Swelling         Review of Systems   Review of Systems   Constitutional: Positive for diaphoresis. Cardiovascular: Positive for chest pain. Gastrointestinal: Negative for abdominal pain, nausea and vomiting. Neurological: Negative for speech difficulty and weakness. All other systems reviewed and are negative. Physical Exam     Vitals:    06/25/19 1232 06/25/19 1400   BP: (!) 160/101 169/88   Pulse: (!) 120 96   Resp: 22 17   Temp: 97.9 °F (36.6 °C)    SpO2: 99% 99%   Weight: 113.4 kg (250 lb)    Height: 5' 2\" (1.575 m)      Physical Exam   Constitutional: She is oriented to person, place, and time. She appears well-developed and well-nourished. No distress. HENT:   Head: Normocephalic and atraumatic. Right Ear: Tympanic membrane normal.   Left Ear: Tympanic membrane normal.   Mouth/Throat: Oropharynx is clear and moist. No oropharyngeal exudate. Eyes: Conjunctivae are normal.   Cardiovascular: Normal rate, regular rhythm and normal heart sounds. Pulmonary/Chest: Effort normal and breath sounds normal. No respiratory distress. She has no wheezes. She has no rales. Neurological: She is alert and oriented to person, place, and time. She has normal strength. She displays no tremor. She displays no seizure activity. GCS eye subscore is 4. GCS verbal subscore is 5. GCS motor subscore is 6. Skin: Skin is warm and dry. Psychiatric: She has a normal mood and affect. Her behavior is normal. Judgment and thought content normal.   Nursing note and vitals reviewed.   at 2:36 PM    Diagnostic Study Results     Labs -     Recent Results (from the past 12 hour(s))   CBC WITH AUTOMATED DIFF    Collection Time: 06/25/19 12:59 PM   Result Value Ref Range    WBC 6.5 3.6 - 11.0 K/uL    RBC 4.24 3.80 - 5.20 M/uL    HGB 8.4 (L) 11.5 - 16.0 g/dL    HCT 28.7 (L) 35.0 - 47.0 %    MCV 67.7 (L) 80.0 - 99.0 FL    MCH 19.8 (L) 26.0 - 34.0 PG    MCHC 29.3 (L) 30.0 - 36.5 g/dL    RDW 18.5 (H) 11.5 - 14.5 %    PLATELET 615 224 - 864 K/uL    MPV 10.3 8.9 - 12.9 FL    NRBC 0.0 0  WBC    ABSOLUTE NRBC 0.00 0.00 - 0.01 K/uL    NEUTROPHILS 44 32 - 75 %    LYMPHOCYTES 42 12 - 49 %    MONOCYTES 7 5 - 13 %    EOSINOPHILS 1 0 - 7 %    BASOPHILS 1 0 - 1 %    IMMATURE GRANULOCYTES 5 (H) 0.0 - 0.5 %    ABS. NEUTROPHILS 2.8 1.8 - 8.0 K/UL    ABS. LYMPHOCYTES 2.7 0.8 - 3.5 K/UL    ABS. MONOCYTES 0.5 0.0 - 1.0 K/UL    ABS. EOSINOPHILS 0.1 0.0 - 0.4 K/UL    ABS. BASOPHILS 0.1 0.0 - 0.1 K/UL    ABS. IMM. GRANS. 0.3 (H) 0.00 - 0.04 K/UL    DF SMEAR SCANNED      RBC COMMENTS MICROCYTOSIS  1+       METABOLIC PANEL, COMPREHENSIVE    Collection Time: 06/25/19 12:59 PM   Result Value Ref Range    Sodium 138 136 - 145 mmol/L    Potassium 4.0 3.5 - 5.1 mmol/L    Chloride 101 97 - 108 mmol/L    CO2 25 21 - 32 mmol/L    Anion gap 12 5 - 15 mmol/L    Glucose 149 (H) 65 - 100 mg/dL    BUN 13 6 - 20 MG/DL    Creatinine 0.89 0.55 - 1.02 MG/DL    BUN/Creatinine ratio 15 12 - 20      GFR est AA >60 >60 ml/min/1.73m2    GFR est non-AA >60 >60 ml/min/1.73m2    Calcium 8.8 8.5 - 10.1 MG/DL    Bilirubin, total 0.7 0.2 - 1.0 MG/DL    ALT (SGPT) 26 12 - 78 U/L    AST (SGOT) 31 15 - 37 U/L    Alk.  phosphatase 74 45 - 117 U/L    Protein, total 8.8 (H) 6.4 - 8.2 g/dL    Albumin 3.9 3.5 - 5.0 g/dL    Globulin 4.9 (H) 2.0 - 4.0 g/dL    A-G Ratio 0.8 (L) 1.1 - 2.2     ETHYL ALCOHOL    Collection Time: 06/25/19 12:59 PM   Result Value Ref Range    ALCOHOL(ETHYL),SERUM 205 (H) <10 MG/DL   TROPONIN I    Collection Time: 06/25/19 12:59 PM   Result Value Ref Range    Troponin-I, Qt. <0.05 <0.05 ng/mL   CK W/ REFLX CKMB    Collection Time: 06/25/19 12:59 PM   Result Value Ref Range     26 - 192 U/L   LIPASE    Collection Time: 06/25/19 12:59 PM   Result Value Ref Range    Lipase 63 (L) 73 - 393 U/L   EKG, 12 LEAD, INITIAL    Collection Time: 06/25/19  1:02 PM   Result Value Ref Range    Ventricular Rate 105 BPM    Atrial Rate 105 BPM    P-R Interval 148 ms    QRS Duration 88 ms    Q-T Interval 354 ms    QTC Calculation (Bezet) 467 ms    Calculated P Axis 46 degrees    Calculated R Axis -39 degrees    Calculated T Axis 61 degrees    Diagnosis       Sinus tachycardia  Left axis deviation  Possible Anterior infarct (cited on or before 25-JUN-2019)  Abnormal ECG  When compared with ECG of 03-JUN-2019 17:50,  No significant change was found     URINALYSIS W/ REFLEX CULTURE    Collection Time: 06/25/19  1:08 PM   Result Value Ref Range    Color YELLOW/STRAW      Appearance CLOUDY (A) CLEAR      Specific gravity 1.025 1.003 - 1.030      pH (UA) 5.5 5.0 - 8.0      Protein 30 (A) NEG mg/dL    Glucose NEGATIVE  NEG mg/dL    Ketone NEGATIVE  NEG mg/dL    Bilirubin NEGATIVE  NEG      Blood LARGE (A) NEG      Urobilinogen 1.0 0.2 - 1.0 EU/dL    Nitrites NEGATIVE  NEG      Leukocyte Esterase SMALL (A) NEG      WBC 0-4 0 - 4 /hpf    RBC 5-10 0 - 5 /hpf    Epithelial cells FEW FEW /lpf    Bacteria NEGATIVE  NEG /hpf    UA:UC IF INDICATED CULTURE NOT INDICATED BY UA RESULT CNI     DRUG SCREEN, URINE    Collection Time: 06/25/19  1:08 PM   Result Value Ref Range    AMPHETAMINES NEGATIVE  NEG      BARBITURATES NEGATIVE  NEG      BENZODIAZEPINES NEGATIVE  NEG      COCAINE POSITIVE (A) NEG      METHADONE NEGATIVE  NEG      OPIATES NEGATIVE  NEG      PCP(PHENCYCLIDINE) NEGATIVE  NEG      THC (TH-CANNABINOL) NEGATIVE  NEG      Drug screen comment (NOTE)        Radiologic Studies -   No orders to display     CT Results  (Last 48 hours)    None        CXR Results  (Last 48 hours)    None            Medical Decision Making   I am the first provider for this patient. I reviewed the vital signs, available nursing notes, past medical history, past surgical history, family history and social history. Vital Signs-Reviewed the patient's vital signs. Records Reviewed:  Old Medical Records    ED Course as of Jun 25 1436   Tue Jun 25, 2019   1414 Patient reevaluated states she is feeling better and ready to go home. We discussed labs and drug use. Patient advised that she needs to stop using and take her medications as prescribed. Patient did not take blood pressure medicine this morning. Patient states she will return to her doctor and start back on medications for alcoholism.    [AH]      ED Course User Index  [AH] Marge Blackwell PA-C          Disposition:  Discharged    DISCHARGE NOTE:   2:38 PM      Care plan outlined and precautions discussed. Patient has no new complaints, changes, or physical findings. Results of labs were reviewed with the patient. All medications were reviewed with the patient; will d/c home. All of pt's questions and concerns were addressed. Patient was instructed and agrees to follow up with PCP prn, as well as to return to the ED upon further deterioration. Patient is ready to go home. Follow-up Information     Follow up With Specialties Details Why Contact Info    Eusebio Craft NP Nurse Practitioner Schedule an appointment as soon as possible for a visit today  [address] [facility]  [phone]            Current Discharge Medication List      START taking these medications    Details   ondansetron (ZOFRAN ODT) 4 mg disintegrating tablet Take 1 Tab by mouth every eight (8) hours as needed for Nausea. Qty: 10 Tab, Refills: 0      cephALEXin (KEFLEX) 500 mg capsule Take 1 Cap by mouth two (2) times a day for 7 days. Qty: 14 Cap, Refills: 0         CONTINUE these medications which have NOT CHANGED    Details   naltrexone microspheres (VIVITROL) 380 mg ER injection 380 mg by IntraMUSCular route every thirty (30) days. Indications: alcoholism      folic acid (FOLVITE) 1 mg tablet Take 1 Tab by mouth daily. Qty: 30 Tab, Refills: 0      thiamine HCL (B-1) 100 mg tablet Take 1 Tab by mouth daily.   Qty: 30 Tab, Refills: 0      VENTOLIN HFA 90 mcg/actuation inhaler Take 2 Inhalation by inhalation four (4) times daily as needed. Refills: 0      cholecalciferol (VITAMIN D3) 50,000 unit capsule Take 1 Cap by mouth every seven (7) days. Refills: 0      VITAMIN D2 50,000 unit capsule Take 1 Cap by mouth every seven (7) days. Refills: 0      ferrous sulfate (IRON) 325 mg (65 mg iron) EC tablet Take 1 Tab by mouth daily. Refills: 0      cloNIDine HCl (CATAPRES) 0.1 mg tablet Take 0.1 mg by mouth two (2) times a day. Refills: 0      losartan (COZAAR) 50 mg tablet Take 50 mg by mouth daily. Refills: 0      meloxicam (MOBIC) 15 mg tablet Take 15 mg by mouth daily. Refills: 0      pantoprazole (PROTONIX) 40 mg tablet Take 40 mg by mouth daily. Refills: 0      VITAMIN B-1, MONONITRATE, 100 mg tablet Take 100 mg by mouth daily. Refills: 0      ketotifen (ZADITOR) 0.025 % (0.035 %) ophthalmic solution Administer 1 Drop to both eyes two (2) times daily as needed (ALLERGIES). cetirizine (ZYRTEC) 10 mg tablet Take 10 mg by mouth daily as needed for Allergies. verapamil ER (VERELAN) 120 mg ER capsule Take 120 mg by mouth daily. hydrOXYzine pamoate (VISTARIL) 25 mg capsule Take 25 mg by mouth three (3) times daily as needed for Itching. albuterol (PROVENTIL, VENTOLIN) 90 mcg/actuation inhaler Take 1-2 Puffs by inhalation every four (4) hours as needed for Wheezing or Shortness of Breath. Qty: 17 g, Refills: 0      fluticasone-salmeterol (ADVAIR DISKUS) 250-50 mcg/dose diskus inhaler Take 1 Puff by inhalation two (2) times a day. Provider Notes (Medical Decision Making):   DDX: ETOH intoxication v withdrawal, electrolyte imbalance, ACS, dehydration      Procedures        Diagnosis     Clinical Impression:   1. Alcohol withdrawal syndrome without complication ([address]    2. Cocaine use    3.  Acute cystitis without hematuria

## 2019-06-25 NOTE — ED TRIAGE NOTES
Pt here for alcohol w/d symptoms. Pt states she drinks 2-3 24oz beers daily. Pt states she had a beer at 9AM.  Pt is c/o shaking, feeling hot, and sweating.

## 2019-06-26 ENCOUNTER — HOSPITAL ENCOUNTER (EMERGENCY)
Age: 44
Discharge: HOME | End: 2019-06-26
Attending: EMERGENCY MEDICINE
Payer: MEDICAID

## 2019-06-26 ENCOUNTER — APPOINTMENT (OUTPATIENT)
Dept: GENERAL RADIOLOGY | Age: 44
End: 2019-06-26
Attending: EMERGENCY MEDICINE
Payer: MEDICAID

## 2019-06-26 VITALS
RESPIRATION RATE: 16 BRPM | DIASTOLIC BLOOD PRESSURE: 88 MMHG | HEART RATE: 81 BPM | WEIGHT: 249.1 LBS | BODY MASS INDEX: 44.14 KG/M2 | HEIGHT: 63 IN | TEMPERATURE: 98.3 F | SYSTOLIC BLOOD PRESSURE: 151 MMHG | OXYGEN SATURATION: 98 %

## 2019-06-26 DIAGNOSIS — R07.89 ATYPICAL CHEST PAIN: Primary | ICD-10-CM

## 2019-06-26 DIAGNOSIS — D64.9 ANEMIA, UNSPECIFIED TYPE: ICD-10-CM

## 2019-06-26 DIAGNOSIS — F14.10 COCAINE ABUSE (HCC): ICD-10-CM

## 2019-06-26 DIAGNOSIS — F10.10 ALCOHOL ABUSE: ICD-10-CM

## 2019-06-26 LAB
ALBUMIN SERPL-MCNC: 4 G/DL (ref 3.5–5)
ALBUMIN/GLOB SERPL: 0.8 {RATIO} (ref 1.1–2.2)
ALP SERPL-CCNC: 71 U/L (ref 45–117)
ALT SERPL-CCNC: 26 U/L (ref 12–78)
ANION GAP SERPL CALC-SCNC: 15 MMOL/L (ref 5–15)
AST SERPL-CCNC: 43 U/L (ref 15–37)
ATRIAL RATE: 105 BPM
ATRIAL RATE: 89 BPM
BASOPHILS # BLD: 0 K/UL (ref 0–0.1)
BASOPHILS NFR BLD: 0 % (ref 0–1)
BILIRUB SERPL-MCNC: 1.2 MG/DL (ref 0.2–1)
BNP SERPL-MCNC: 36 PG/ML (ref 0–125)
BUN SERPL-MCNC: 10 MG/DL (ref 6–20)
BUN/CREAT SERPL: 13 (ref 12–20)
CALCIUM SERPL-MCNC: 8.6 MG/DL (ref 8.5–10.1)
CALCULATED P AXIS, ECG09: 46 DEGREES
CALCULATED P AXIS, ECG09: 69 DEGREES
CALCULATED R AXIS, ECG10: -39 DEGREES
CALCULATED R AXIS, ECG10: 20 DEGREES
CALCULATED T AXIS, ECG11: 61 DEGREES
CALCULATED T AXIS, ECG11: 64 DEGREES
CHLORIDE SERPL-SCNC: 99 MMOL/L (ref 97–108)
CO2 SERPL-SCNC: 23 MMOL/L (ref 21–32)
CREAT SERPL-MCNC: 0.78 MG/DL (ref 0.55–1.02)
DIAGNOSIS, 93000: NORMAL
DIAGNOSIS, 93000: NORMAL
DIFFERENTIAL METHOD BLD: ABNORMAL
EOSINOPHIL # BLD: 0.1 K/UL (ref 0–0.4)
EOSINOPHIL NFR BLD: 1 % (ref 0–7)
ERYTHROCYTE [DISTWIDTH] IN BLOOD BY AUTOMATED COUNT: 18.5 % (ref 11.5–14.5)
ETHANOL SERPL-MCNC: 30 MG/DL
GLOBULIN SER CALC-MCNC: 5 G/DL (ref 2–4)
GLUCOSE SERPL-MCNC: 138 MG/DL (ref 65–100)
HCT VFR BLD AUTO: 28.1 % (ref 35–47)
HGB BLD-MCNC: 8.1 G/DL (ref 11.5–16)
IMM GRANULOCYTES # BLD AUTO: 0.2 K/UL (ref 0–0.04)
IMM GRANULOCYTES NFR BLD AUTO: 3 % (ref 0–0.5)
LYMPHOCYTES # BLD: 1.6 K/UL (ref 0.8–3.5)
LYMPHOCYTES NFR BLD: 29 % (ref 12–49)
MCH RBC QN AUTO: 20 PG (ref 26–34)
MCHC RBC AUTO-ENTMCNC: 28.8 G/DL (ref 30–36.5)
MCV RBC AUTO: 69.2 FL (ref 80–99)
MONOCYTES # BLD: 0.5 K/UL (ref 0–1)
MONOCYTES NFR BLD: 9 % (ref 5–13)
NEUTS SEG # BLD: 3.1 K/UL (ref 1.8–8)
NEUTS SEG NFR BLD: 58 % (ref 32–75)
NRBC # BLD: 0 K/UL (ref 0–0.01)
NRBC BLD-RTO: 0 PER 100 WBC
P-R INTERVAL, ECG05: 146 MS
P-R INTERVAL, ECG05: 148 MS
PLATELET # BLD AUTO: 193 K/UL (ref 150–400)
PMV BLD AUTO: 10.7 FL (ref 8.9–12.9)
POTASSIUM SERPL-SCNC: 4.2 MMOL/L (ref 3.5–5.1)
PROT SERPL-MCNC: 9 G/DL (ref 6.4–8.2)
Q-T INTERVAL, ECG07: 354 MS
Q-T INTERVAL, ECG07: 402 MS
QRS DURATION, ECG06: 88 MS
QRS DURATION, ECG06: 88 MS
QTC CALCULATION (BEZET), ECG08: 467 MS
QTC CALCULATION (BEZET), ECG08: 489 MS
RBC # BLD AUTO: 4.06 M/UL (ref 3.8–5.2)
RBC MORPH BLD: ABNORMAL
SODIUM SERPL-SCNC: 137 MMOL/L (ref 136–145)
TROPONIN I BLD-MCNC: <0.04 NG/ML (ref 0–0.08)
TROPONIN I BLD-MCNC: <0.04 NG/ML (ref 0–0.08)
VENTRICULAR RATE, ECG03: 105 BPM
VENTRICULAR RATE, ECG03: 89 BPM
WBC # BLD AUTO: 5.5 K/UL (ref 3.6–11)

## 2019-06-26 PROCEDURE — 36415 COLL VENOUS BLD VENIPUNCTURE: CPT

## 2019-06-26 PROCEDURE — 74011250636 HC RX REV CODE- 250/636: Performed by: EMERGENCY MEDICINE

## 2019-06-26 PROCEDURE — 99285 EMERGENCY DEPT VISIT HI MDM: CPT

## 2019-06-26 PROCEDURE — 74011250637 HC RX REV CODE- 250/637: Performed by: EMERGENCY MEDICINE

## 2019-06-26 PROCEDURE — 96374 THER/PROPH/DIAG INJ IV PUSH: CPT

## 2019-06-26 PROCEDURE — 84484 ASSAY OF TROPONIN QUANT: CPT

## 2019-06-26 PROCEDURE — 80053 COMPREHEN METABOLIC PANEL: CPT

## 2019-06-26 PROCEDURE — 83880 ASSAY OF NATRIURETIC PEPTIDE: CPT

## 2019-06-26 PROCEDURE — 93005 ELECTROCARDIOGRAM TRACING: CPT

## 2019-06-26 PROCEDURE — 80307 DRUG TEST PRSMV CHEM ANLYZR: CPT

## 2019-06-26 PROCEDURE — 85025 COMPLETE CBC W/AUTO DIFF WBC: CPT

## 2019-06-26 PROCEDURE — 71045 X-RAY EXAM CHEST 1 VIEW: CPT

## 2019-06-26 RX ORDER — VERAPAMIL HYDROCHLORIDE 80 MG/1
120 TABLET ORAL
Status: COMPLETED | OUTPATIENT
Start: 2019-06-26 | End: 2019-06-26

## 2019-06-26 RX ORDER — ONDANSETRON 2 MG/ML
4 INJECTION INTRAMUSCULAR; INTRAVENOUS
Status: COMPLETED | OUTPATIENT
Start: 2019-06-26 | End: 2019-06-26

## 2019-06-26 RX ORDER — VERAPAMIL HYDROCHLORIDE 80 MG/1
120 TABLET ORAL 3 TIMES DAILY
Status: DISCONTINUED | OUTPATIENT
Start: 2019-06-26 | End: 2019-06-26

## 2019-06-26 RX ORDER — CLONIDINE HYDROCHLORIDE 0.1 MG/1
0.2 TABLET ORAL
Status: COMPLETED | OUTPATIENT
Start: 2019-06-26 | End: 2019-06-26

## 2019-06-26 RX ORDER — CHLORDIAZEPOXIDE HYDROCHLORIDE 25 MG/1
CAPSULE, GELATIN COATED ORAL
Qty: 14 CAP | Refills: 0 | Status: SHIPPED | OUTPATIENT
Start: 2019-06-26 | End: 2019-06-29

## 2019-06-26 RX ADMIN — VERAPAMIL HYDROCHLORIDE 120 MG: 80 TABLET, FILM COATED ORAL at 07:16

## 2019-06-26 RX ADMIN — CLONIDINE HYDROCHLORIDE 0.2 MG: 0.1 TABLET ORAL at 04:38

## 2019-06-26 RX ADMIN — ONDANSETRON 4 MG: 2 INJECTION INTRAMUSCULAR; INTRAVENOUS at 05:33

## 2019-06-26 NOTE — ED NOTES
Discharge summary and discharge medications reviewed with patient and appropriate educational materials and side effects teaching were provided. patient  Given 1 paper prescriptions and 0 electronic prescriptions sent to pt's listed pharmacy. Patient verbalized understanding of the importance of discussing medications with his or her physician or clinic they will be following up with. No si/s of acute distress prior to discharge. Patient offered wheelchair from treatment area to hospital entrance, patient declined wheelchair. Provided with work excuse.

## 2019-06-26 NOTE — ED TRIAGE NOTES
Pt returns to the ED with continued complaints of left sided chest pain and vomiting x 30 minutes ago after drinking 3 beers this afternoon. Pt reported to the ED yesterday with the same complaints and withdrawal from cocaine and alcohol. Denies the pain radiating. Denies a hx of cardiac hx. Pt made aware of her HTN and stated she hasn't taken her medication in 2 days. Denies SOB. Pt is alert, oriented and appropriate. Able to transfer from [facility] independently. Emergency Department Nursing Plan of Care       The Nursing Plan of Care is developed from the Nursing assessment and Emergency Department Attending provider initial evaluation. The plan of care may be reviewed in the ED Provider note.     The Plan of Care was developed with the following considerations:   Patient / Family readiness to learn indicated by:verbalized understanding  Persons(s) to be included in education: patient  Barriers to Learning/Limitations:No    Signed     Heber Dalal    6/26/2019   4:33 AM

## 2019-06-26 NOTE — ED NOTES
Patient awake and resting in stretcher. Strong smell of ETOH noted upon entering room. Patient is pleasant and cooperative this morning. No complaints verbalized at rest.    Emergency [address] is developed from the Nursing assessment and Emergency Department Attending provider initial evaluation. The plan of care may be reviewed in the ED Provider note.     The Plan of Care was developed with the following considerations:   Patient / Family readiness to learn indicated by:verbalized understanding  Persons(s) to be included in education: patient  Barriers to Learning/Limitations:No    Signed     Cely Varela RN    6/26/2019   7:14 AM

## 2019-06-26 NOTE — ED NOTES
Verbal shift change report given to Santa Obando RN (oncoming nurse) by Aide Cohen RN (offgoing nurse). Report included the following information SBAR.

## 2019-06-26 NOTE — LETTER
[facility] EMERGENCY DEPT 
[address] [place] [id number] 
[phone] Work/School Note Date: 6/26/2019 To Whom It May concern: 
 
Rohan Virgen was seen and treated today in the emergency room by the following provider(s): 
Attending Provider: Al Barrett MD.   
 
Rohan Virgen may return to work on 6/28/2019. Sincerely, Kristen Hayes RN

## 2019-06-26 NOTE — ED NOTES
Called nursing supervisor to bring the verapamil from the psych unit. HOLDING medication due to patients blood pressure being 178/87. MD made aware.

## 2019-06-26 NOTE — ED PROVIDER NOTES
EMERGENCY DEPARTMENT HISTORY AND PHYSICAL EXAM      Date: 6/26/2019  Patient Name: Rohan Virgen    History of Presenting Illness     Chief Complaint   Patient presents with    Chest Pain       History Provided By: Patient    HPI: Rohan Virgen, 37 y.o. female with PMHx significant for alcohol abuse cocaine abuse morbid obesity heart disease COPD, presents by EMS for the second time in 24 hours to the ED with cc of his pain and possible alcohol withdrawal.  This is a 25-year-old female with a long medical history of alcohol and drug abuse along with obesity high blood pressure diabetes and other problems. She is had chest pain for the past several hours. She has some shortness of breath with that some nausea with that. No syncope. She has had alcohol today since her discharge from this ER. There are no other complaints, changes, or physical findings at this time. PCP: Onofre Ramirez NP    Current Facility-Administered Medications   Medication Dose Route Frequency Provider Last Rate Last Dose    verapamil (CALAN) tablet 120 mg  120 mg Oral TID Matteo Triplett MD         Current Outpatient Medications   Medication Sig Dispense Refill    naltrexone microspheres (VIVITROL) 380 mg ER injection 380 mg by IntraMUSCular route every thirty (30) days. Indications: alcoholism      ondansetron (ZOFRAN ODT) 4 mg disintegrating tablet Take 1 Tab by mouth every eight (8) hours as needed for Nausea. 10 Tab 0    cephALEXin (KEFLEX) 500 mg capsule Take 1 Cap by mouth two (2) times a day for 7 days. 14 Cap 0    folic acid (FOLVITE) 1 mg tablet Take 1 Tab by mouth daily. 30 Tab 0    thiamine HCL (B-1) 100 mg tablet Take 1 Tab by mouth daily. 30 Tab 0    VENTOLIN HFA 90 mcg/actuation inhaler Take 2 Inhalation by inhalation four (4) times daily as needed. 0    cholecalciferol (VITAMIN D3) 50,000 unit capsule Take 1 Cap by mouth every seven (7) days.   0    VITAMIN D2 50,000 unit capsule Take 1 Cap by mouth every seven (7) days. 0    ferrous sulfate (IRON) 325 mg (65 mg iron) EC tablet Take 1 Tab by mouth daily. 0    cloNIDine HCl (CATAPRES) 0.1 mg tablet Take 0.1 mg by mouth two (2) times a day.  0    losartan (COZAAR) 50 mg tablet Take 50 mg by mouth daily. 0    meloxicam (MOBIC) 15 mg tablet Take 15 mg by mouth daily. 0    pantoprazole (PROTONIX) 40 mg tablet Take 40 mg by mouth daily. 0    VITAMIN B-1, MONONITRATE, 100 mg tablet Take 100 mg by mouth daily. 0    ketotifen (ZADITOR) 0.025 % (0.035 %) ophthalmic solution Administer 1 Drop to both eyes two (2) times daily as needed (ALLERGIES).  cetirizine (ZYRTEC) 10 mg tablet Take 10 mg by mouth daily as needed for Allergies.  verapamil ER (VERELAN) 120 mg ER capsule Take 120 mg by mouth daily.  hydrOXYzine pamoate (VISTARIL) 25 mg capsule Take 25 mg by mouth three (3) times daily as needed for Itching.  albuterol (PROVENTIL, VENTOLIN) 90 mcg/actuation inhaler Take 1-2 Puffs by inhalation every four (4) hours as needed for Wheezing or Shortness of Breath. 17 g 0    fluticasone-salmeterol (ADVAIR DISKUS) 250-50 mcg/dose diskus inhaler Take 1 Puff by inhalation two (2) times a day. Past History     Past Medical History:  Past Medical History:   Diagnosis Date    Asthma     Diabetes ([address]     Hypertension     Psychiatric disorder     depression    Sleep apnea        Past Surgical History:  Past Surgical History:   Procedure Laterality Date    HX TUBAL LIGATION         Family History:  Family History   Problem Relation Age of Onset    Alcohol abuse Mother     Alcohol abuse Father        Social History:  Social History     Tobacco Use    Smoking status: Never Smoker    Smokeless tobacco: Never Used   Substance Use Topics    Alcohol use: Yes     Comment: 2 or 3 40's a day 7/18/17    Drug use: Not Currently     Types: Cocaine     Comment: using millicent since age 11, no rehab in 13 yrs. Allergies:   Allergies Allergen Reactions    Morphine Itching     Itching on torso, legs, arms    Aspirin Rash     Patient denies allergy, sometimes itches    Lisinopril Swelling         Review of Systems   Review of Systems   Constitutional: Negative for chills and fever. HENT: Negative for congestion, rhinorrhea, sneezing and sore throat. Respiratory: Negative for shortness of breath. Cardiovascular: Negative for chest pain. Gastrointestinal: Negative for abdominal pain, nausea and vomiting. Musculoskeletal: Negative for back pain, myalgias and neck stiffness. Skin: Negative for rash. Neurological: Negative for dizziness, weakness and headaches. All other systems reviewed and are negative. Physical Exam   Physical Exam   Constitutional: She is oriented to person, place, and time. She appears well-developed and well-nourished. HENT:   Head: Normocephalic and atraumatic. Mouth/Throat: Oropharynx is clear and moist.   Eyes: Conjunctivae and EOM are normal.   Neck: Normal range of motion and full passive range of motion without pain. Neck supple. Cardiovascular: Normal rate, regular rhythm, S1 normal, S2 normal, normal heart sounds, intact distal pulses and normal pulses. No murmur heard. Pulmonary/Chest: Effort normal and breath sounds normal. No respiratory distress. She has no wheezes. Abdominal: Soft. Normal appearance and bowel sounds are normal. She exhibits no distension. There is no tenderness. There is no rebound. Musculoskeletal: Normal range of motion. Neurological: She is alert and oriented to person, place, and time. She has normal strength. Skin: Skin is warm, dry and intact. No rash noted. Psychiatric: She has a normal mood and affect. Her speech is normal and behavior is normal. Judgment and thought content normal.   Nursing note and vitals reviewed.       Diagnostic Study Results     Labs -     Recent Results (from the past 12 hour(s))   CBC WITH AUTOMATED DIFF    Collection Time: 06/26/19  4:38 AM   Result Value Ref Range    WBC 5.5 3.6 - 11.0 K/uL    RBC 4.06 3.80 - 5.20 M/uL    HGB 8.1 (L) 11.5 - 16.0 g/dL    HCT 28.1 (L) 35.0 - 47.0 %    MCV 69.2 (L) 80.0 - 99.0 FL    MCH 20.0 (L) 26.0 - 34.0 PG    MCHC 28.8 (L) 30.0 - 36.5 g/dL    RDW 18.5 (H) 11.5 - 14.5 %    PLATELET 100 109 - 192 K/uL    MPV 10.7 8.9 - 12.9 FL    NRBC 0.0 0  WBC    ABSOLUTE NRBC 0.00 0.00 - 0.01 K/uL    NEUTROPHILS 58 32 - 75 %    LYMPHOCYTES 29 12 - 49 %    MONOCYTES 9 5 - 13 %    EOSINOPHILS 1 0 - 7 %    BASOPHILS 0 0 - 1 %    IMMATURE GRANULOCYTES 3 (H) 0.0 - 0.5 %    ABS. NEUTROPHILS 3.1 1.8 - 8.0 K/UL    ABS. LYMPHOCYTES 1.6 0.8 - 3.5 K/UL    ABS. MONOCYTES 0.5 0.0 - 1.0 K/UL    ABS. EOSINOPHILS 0.1 0.0 - 0.4 K/UL    ABS. BASOPHILS 0.0 0.0 - 0.1 K/UL    ABS. IMM. GRANS. 0.2 (H) 0.00 - 0.04 K/UL    DF SMEAR SCANNED      RBC COMMENTS ANISOCYTOSIS  2+        RBC COMMENTS HYPOCHROMIA  1+        RBC COMMENTS TARGET CELLS  1+       METABOLIC PANEL, COMPREHENSIVE    Collection Time: 06/26/19  4:38 AM   Result Value Ref Range    Sodium 137 136 - 145 mmol/L    Potassium 4.2 3.5 - 5.1 mmol/L    Chloride 99 97 - 108 mmol/L    CO2 23 21 - 32 mmol/L    Anion gap 15 5 - 15 mmol/L    Glucose 138 (H) 65 - 100 mg/dL    BUN 10 6 - 20 MG/DL    Creatinine 0.78 0.55 - 1.02 MG/DL    BUN/Creatinine ratio 13 12 - 20      GFR est AA >60 >60 ml/min/1.73m2    GFR est non-AA >60 >60 ml/min/1.73m2    Calcium 8.6 8.5 - 10.1 MG/DL    Bilirubin, total 1.2 (H) 0.2 - 1.0 MG/DL    ALT (SGPT) 26 12 - 78 U/L    AST (SGOT) 43 (H) 15 - 37 U/L    Alk.  phosphatase 71 45 - 117 U/L    Protein, total 9.0 (H) 6.4 - 8.2 g/dL    Albumin 4.0 3.5 - 5.0 g/dL    Globulin 5.0 (H) 2.0 - 4.0 g/dL    A-G Ratio 0.8 (L) 1.1 - 2.2     NT-PRO BNP    Collection Time: 06/26/19  4:38 AM   Result Value Ref Range    NT pro-BNP 36 0 - 125 PG/ML   ETHYL ALCOHOL    Collection Time: 06/26/19  4:38 AM   Result Value Ref Range    ALCOHOL(ETHYL),SERUM 30 (H) <10 MG/DL POC TROPONIN-I    Collection Time: 06/26/19  5:12 AM   Result Value Ref Range    Troponin-I (POC) <0.04 0.00 - 0.08 ng/mL       Radiologic Studies -   XR CHEST PORT    (Results Pending)     CT Results  (Last 48 hours)    None        CXR Results  (Last 48 hours)    None            Medical Decision Making   I am the first provider for this patient. I reviewed the vital signs, available nursing notes, past medical history, past surgical history, family history and social history. Vital Signs-Reviewed the patient's vital signs. Patient Vitals for the past 12 hrs:   Temp Pulse Resp BP SpO2   06/26/19 0603    (!) 183/102 98 %   06/26/19 0600  89 16 (!) 187/118 98 %   06/26/19 0530  96  (!) 212/101 99 %   06/26/19 0508    (!) 208/114    06/26/19 0427    (!) 209/111    06/26/19 0424 98.1 °F (36.7 °C) (!) 105 22 (!) 221/106 97 %         Records Reviewed: Nursing Notes and Old Medical Records    Provider Notes (Medical Decision Making):   Arrhythmia versus coronary artery disease versus PE versus congestive heart failure versus alcohol withdrawal versus cocaine withdrawal    ED Course:   Initial assessment performed. The patients presenting problems have been discussed, and they are in agreement with the care plan formulated and outlined with them. I have encouraged them to ask questions as they arise throughout their visit. Disposition:  Patient informed of results of workup and is comfortable with discharge to home to follow up with PCP. They are instructed to return as needed for worsening condition. PLAN:  1. Current Discharge Medication List        2. Follow-up Information    None       Return to ED if worse     Diagnosis     Clinical Impression:   1.  Atypical chest pain

## 2019-06-26 NOTE — DISCHARGE INSTRUCTIONS
Patient Education        Learning About Alcohol Misuse  What is alcohol misuse? Alcohol misuse means drinking so much that it causes problems for you or others. Early problems with alcohol can start at home. You may argue with loved ones about how much you're drinking. Your job may be affected because of drinking. You may drink when it's dangerous or illegal, such as when you drive. Drinking too much for a long time can lead to health conditions like high blood pressure and liver problems. What are the symptoms? Symptoms of alcohol misuse may include:  · Drinking much more than you planned. · Drinking even though it's causing problems for you or others. · Putting yourself in situations where you might get hurt. · Wanting to cut down or stop drinking, but not being able to. · Feeling guilty about how much you're drinking. How is alcohol misuse treated? Getting help for problems with alcohol is up to you. But you don't have to do it alone. There are many people and kinds of treatments to help with alcohol problems. Talking to your doctor is the first step. When you get a doctor's help, treatment for alcohol problems can be safer and quicker. Treatment options can include:  · Treatment programs. Examples are group therapy, one or more types of counseling, and alcohol education. · Medicines. A doctor or counselor can help you know what kinds of medicines might help with cravings. · Free social support groups. These groups include AA (Alcoholics Anonymous) and SMART (Self-Management and Recovery Training). Your doctor can help you decide which type of program is best for you. Follow-up care is a key part of your treatment and safety. Be sure to make and go to all appointments, and call your doctor if you are having problems. It's also a good idea to know your test results and keep a list of the medicines you take. Where can you learn more? Go to http://estefania-gian.info/.   Enter H758 in the search box to learn more about \"Learning About Alcohol Misuse. \"  Current as of: May 7, 2018  Content Version: 11.9  © 2006-2018 [employer]. Care instructions adapted under license by [employer] (which disclaims liability or warranty for this information). If you have questions about a medical condition or this instruction, always ask your healthcare professional. [place] [address] any warranty or liability for your use of this information. Patient Education        Chest Pain: Care Instructions  Your Care Instructions    There are many things that can cause chest pain. Some are not serious and will get better on their own in a few days. But some kinds of chest pain need more testing and treatment. Your doctor may have recommended a follow-up visit in the next 8 to 12 hours. If you are not getting better, you may need more tests or treatment. Even though your doctor has released you, you still need to watch for any problems. The doctor carefully checked you, but sometimes problems can develop later. If you have new symptoms or if your symptoms do not get better, get medical care right away. If you have worse or different chest pain or pressure that lasts more than 5 minutes or you passed out (lost consciousness), call 911 or seek other emergency help right away. A medical visit is only one step in your treatment. Even if you feel better, you still need to do what your doctor recommends, such as going to all suggested follow-up appointments and taking medicines exactly as directed. This will help you recover and help prevent future problems. How can you care for yourself at home? · Rest until you feel better. · Take your medicine exactly as prescribed. Call your doctor if you think you are having a problem with your medicine. · Do not drive after taking a prescription pain medicine. When should you call for help?   Call 911 if:    · You passed out (lost consciousness).     · You have severe difficulty breathing.     · You have symptoms of a heart attack. These may include:  ? Chest pain or pressure, or a strange feeling in your chest.  ? Sweating. ? Shortness of breath. ? Nausea or vomiting. ? Pain, pressure, or a strange feeling in your back, neck, jaw, or upper belly or in one or both shoulders or arms. ? Lightheadedness or sudden weakness. ? A fast or irregular heartbeat. After you call 911, the  may tell you to chew 1 adult-strength or 2 to 4 low-dose aspirin. Wait for an ambulance. Do not try to drive yourself.    Call your doctor today if:    · You have any trouble breathing.     · Your chest pain gets worse.     · You are dizzy or lightheaded, or you feel like you may faint.     · You are not getting better as expected.     · You are having new or different chest pain. Where can you learn more? Go to http://estefania-gian.info/. Enter A120 in the search box to learn more about \"Chest Pain: Care Instructions. \"  Current as of: September 23, 2018  Content Version: 11.9  © 3664-7243 [employer]. Care instructions adapted under license by [employer] (which disclaims liability or warranty for this information). If you have questions about a medical condition or this instruction, always ask your healthcare professional. [place] [address] any warranty or liability for your use of this information.

## 2019-06-26 NOTE — ED NOTES
Pt resting quietly in room. Reports her pain decreasing to 2/10 and her nausea is gone. pts blood pressure is 212/101 after the clonidine. MD made aware.

## 2019-11-06 ENCOUNTER — HOSPITAL ENCOUNTER (EMERGENCY)
Age: 44
Discharge: LEFT BEFORE DISCHARGE | End: 2019-11-06
Attending: EMERGENCY MEDICINE
Payer: MEDICAID

## 2019-11-06 VITALS
DIASTOLIC BLOOD PRESSURE: 73 MMHG | SYSTOLIC BLOOD PRESSURE: 145 MMHG | BODY MASS INDEX: 43.41 KG/M2 | HEIGHT: 63 IN | HEART RATE: 92 BPM | TEMPERATURE: 97.4 F | WEIGHT: 245 LBS | RESPIRATION RATE: 18 BRPM | OXYGEN SATURATION: 97 %

## 2019-11-06 LAB
GLUCOSE BLD STRIP.AUTO-MCNC: 187 MG/DL (ref 65–100)
SERVICE CMNT-IMP: ABNORMAL

## 2019-11-06 PROCEDURE — 75810000275 HC EMERGENCY DEPT VISIT NO LEVEL OF CARE

## 2019-11-06 PROCEDURE — 82962 GLUCOSE BLOOD TEST: CPT

## 2019-11-06 NOTE — ED TRIAGE NOTES
Patient presents to the ED with c/o dizziness x2 days. Pt reports vomiting and diarrhea. Pt reports a hx of diabetes. Pt reports taking nausea medication but unsure what she took.

## 2020-06-29 NOTE — ED NOTES
Patient called to treatment area with no response.
Patient called to treatment area with no response.
Patient called to treatment area. No response.
[id number]

## 2023-11-15 NOTE — PROGRESS NOTES
Pt here for iron infusion. Arrives Ambulating independently, accompanied by Other self           Modifications in dose or schedule: No     Frequency of blood return and site check throughout administration: Prior to administration   Discharged to Home, Ambulating independently, accompanied by: Other self    Outpatient Oncology Care Plan  Problem list:  anemia  Problems related to:    disease/disease progression  Interventions:  administered iron  Expected outcomes:  optimal lab values  Progress towards outcome:  making progress    Education Record    Learner:  Patient  Barriers / Limitations:  None  Method:  Brief focused  Outcome:  Shows understanding  Comments:observed pt 30 min post infusion. Pt tolerated infusion. No c/o. VSS. See flowsheet    Here for faraheme.  F/u labs in the new year scheduled Pt seen, examined, chart reviewed. Pt is admitted with Alcohol withdrawal vs cocaine overdose and Poly substance drug abuse, anemia and UTI.   Cont current POC, follow h/h, hematology consult requested, for PRBC today